# Patient Record
Sex: MALE | Race: WHITE | Employment: FULL TIME | URBAN - NONMETROPOLITAN AREA
[De-identification: names, ages, dates, MRNs, and addresses within clinical notes are randomized per-mention and may not be internally consistent; named-entity substitution may affect disease eponyms.]

---

## 2022-11-18 ENCOUNTER — HOSPITAL ENCOUNTER (EMERGENCY)
Age: 42
Discharge: HOME | End: 2022-11-18
Attending: FAMILY MEDICINE
Payer: COMMERCIAL

## 2022-11-18 VITALS
HEIGHT: 67 IN | BODY MASS INDEX: 30.34 KG/M2 | TEMPERATURE: 98.8 F | RESPIRATION RATE: 18 BRPM | SYSTOLIC BLOOD PRESSURE: 139 MMHG | WEIGHT: 193.3 LBS | HEART RATE: 64 BPM | OXYGEN SATURATION: 98 % | DIASTOLIC BLOOD PRESSURE: 73 MMHG

## 2022-11-18 DIAGNOSIS — M54.31 SCIATICA OF RIGHT SIDE: Primary | ICD-10-CM

## 2022-11-18 PROCEDURE — 99283 EMERGENCY DEPT VISIT LOW MDM: CPT

## 2022-11-18 RX ORDER — IBUPROFEN 600 MG/1
600 TABLET ORAL EVERY 6 HOURS PRN
Qty: 30 TABLET | Refills: 0 | Status: SHIPPED | OUTPATIENT
Start: 2022-11-18

## 2022-11-18 RX ORDER — PREDNISONE 20 MG/1
60 TABLET ORAL DAILY
Qty: 15 TABLET | Refills: 0 | Status: SHIPPED | OUTPATIENT
Start: 2022-11-18 | End: 2022-11-23

## 2022-11-18 ASSESSMENT — PAIN DESCRIPTION - DESCRIPTORS: DESCRIPTORS: ACHING

## 2022-11-18 ASSESSMENT — PAIN DESCRIPTION - LOCATION: LOCATION: BACK

## 2022-11-18 ASSESSMENT — PAIN DESCRIPTION - ORIENTATION: ORIENTATION: RIGHT

## 2022-11-18 ASSESSMENT — PAIN - FUNCTIONAL ASSESSMENT: PAIN_FUNCTIONAL_ASSESSMENT: 0-10

## 2022-11-18 ASSESSMENT — PAIN DESCRIPTION - PAIN TYPE: TYPE: ACUTE PAIN

## 2022-11-18 NOTE — LETTER
[facility] ED  [address] [facility] [address]  Phone: [phone]               November 18, 2022    Patient: Frankey Gutta   YOB: 1980   Date of Visit: 11/18/2022       To Whom It May Concern:    Frankey Gutta was seen and treated in our emergency department on 11/18/2022. He may return to work on 11/21/2022.       Sincerely,       Nicanor Bennett RN         Signature:__________________________________

## 2022-11-19 NOTE — ED PROVIDER NOTES
[address] [facility]  eMERGENCY dEPARTMENT eNCOUnter          CHIEF COMPLAINT       Chief Complaint   Patient presents with    Back Pain     Pt has sudden back pain to right side of back while at work and fell backwards. [facility] called out for seizures. Nurses Notes reviewed and I agree except as noted in the HPI. HISTORY OF PRESENT ILLNESS    Iliana Ruiz is a 36 y.o. male who presents to the emergency room via private vehicle, patient had sudden onset back pain, EMS had initially been called for possible seizures, patient states he has a history of seizures but not have been a 19 years, they when he fell backwards and was stiff and some he probably thought he was having 1, patient declined EMS transport. Patient states he was standing at work leaning forward, when a sudden onset of back pain aching in the right lower back, and fell backwards at the time, states 2 coworkers helped him back, and knees was very stiff, though was not having any seizure event, remembers everything that happened. Patient continues to have pain in his right lower back, describes the pain more as aching at this time, does not quantify the pain. Denies radiation of the pain down the leg. Denies loss of bowel or bladder continence denies urinary retention denies loss of sensation between legs. Denies any prior history of similar. REVIEW OF SYSTEMS     Review of Systems   All other systems reviewed and are negative. PAST MEDICAL HISTORY    has a past medical history of Seizures ([facility] [address] SURGICAL HISTORY      has no past surgical history on file. CURRENT MEDICATIONS       Discharge Medication List as of 11/18/2022  5:49 PM          ALLERGIES     is allergic to benadryl [diphenhydramine] and topamax [topiramate]. FAMILY HISTORY     has no family status information on file. family history is not on file.     SOCIAL HISTORY          PHYSICAL EXAM     INITIAL VITALS:  height is 5' 6.5\" (1.689 m) and weight is 193 lb 4.8 oz (87.7 kg). His oral temperature is 98.8 °F (37.1 °C). His blood pressure is 139/73 and his pulse is 64. His respiration is 18 and oxygen saturation is 98%. Physical Exam   Constitutional: Patient is oriented to person, place, and time. Patient appears well-developed and well-nourished. Patient is active and cooperative. HENT:   Head: Normocephalic and atraumatic. Head is without contusion. Right Ear: Hearing and external ear normal. No drainage. Left Ear: Hearing and external ear normal. No drainage. Nose: Nose normal. No nasal deformity. No epistaxis. Mouth/Throat: Mucous membranes are not dry. Eyes: EOMI. Conjunctivae, sclera, and lids are normal. Right eye exhibits no discharge. Left eye exhibits no discharge. Neck: Full passive range of motion without pain and phonation normal.   Cardiovascular:  Normal rate, regular rhythm and intact distal pulses. No edema. Negative Homans' sign  Pulses: Right radial pulse  2+   Pulmonary/Chest: Effort normal. No tachypnea and no bradypnea. No wheezes, rhonchi, or rales. Abdominal: Soft. Patient without distension or tenderness  Musculoskeletal:   Focused examination of patient's lower back shows no vertebral point step-off or point tenderness, there are some right paraspinal muscle spasm, as well as some tightness in tenderness in the midline superior buttocks area, no overlying integument aberration. Patient is able to flex at the hip and the knee as well as rotate within the hip joint without difficulty, although straight leg test was weakly positive on the right. Distal CSM intact. Except as otherwise noted, negative acute trauma or deformity,  apparent full range of motion and normal strength all extremities appropriate to age. Neurological: Patient is alert and oriented to person, place, and time. patient displays no tremor. Patient displays no seizure activity.  .  Straight leg test negative on the left, weakly positive on the right. Patellar reflexes absent bilaterally, Achilles reflexes 1+ bilaterally. Lymphatic:    Skin: Skin is warm and dry. Patient is not diaphoretic. Psychiatric: Patient has a normal mood and affect. Patient speech is normal and behavior is normal. Cognition and memory are normal.     DIFFERENTIAL DIAGNOSIS:   MSK NOS, sciatica    DIAGNOSTIC RESULTS           RADIOLOGY: non-plain film images(s) such as CT, Ultrasound and MRI are read by the radiologist.  No orders to display       LABS:   Labs Reviewed - No data to display    EMERGENCY DEPARTMENT COURSE:   Vitals:    Vitals:    11/18/22 1716   BP: 139/73   Pulse: 64   Resp: 18   Temp: 98.8 °F (37.1 °C)   TempSrc: Oral   SpO2: 98%   Weight: 193 lb 4.8 oz (87.7 kg)   Height: 5' 6.5\" (1.689 m)     Patient history and physical exam taken at bedside, discussed patient symptoms and exam findings, discussed clinical diagnosis likely sciatica on the right side, discussed with patient the meaning of this term, discussed stretching exercises, Motrin/Tylenol for baseline pain control, place patient on steroid burst, outpatient follow-up, return if symptoms change worse other concerns, acknowledged    FINAL IMPRESSION      1.  Sciatica of right side          DISPOSITION/PLAN   D/c    PATIENT REFERRED TO:  Nanci Sutton  [address] [place] [address]  [phone]    Call         DISCHARGE MEDICATIONS:  Discharge Medication List as of 11/18/2022  5:49 PM        START taking these medications    Details   predniSONE (DELTASONE) 20 MG tablet Take 3 tablets by mouth daily for 5 days, Disp-15 tablet, R-0Normal      ibuprofen (IBU) 600 MG tablet Take 1 tablet by mouth every 6 hours as needed for Pain, Disp-30 tablet, R-0Normal                 Summation      Patient Course:  d/c    ED Medications administered this visit:  Medications - No data to display    New Prescriptions from this visit:    Discharge Medication List as of 11/18/2022  5:49 PM

## 2023-09-23 ENCOUNTER — HOSPITAL ENCOUNTER (EMERGENCY)
Age: 43
Discharge: HOME | End: 2023-09-23
Payer: MEDICARE

## 2023-09-23 VITALS
SYSTOLIC BLOOD PRESSURE: 135 MMHG | HEART RATE: 55 BPM | DIASTOLIC BLOOD PRESSURE: 61 MMHG | RESPIRATION RATE: 15 BRPM | OXYGEN SATURATION: 98 %

## 2023-09-23 VITALS — OXYGEN SATURATION: 99 % | HEART RATE: 62 BPM | RESPIRATION RATE: 15 BRPM

## 2023-09-23 VITALS
HEART RATE: 57 BPM | OXYGEN SATURATION: 98 % | DIASTOLIC BLOOD PRESSURE: 60 MMHG | SYSTOLIC BLOOD PRESSURE: 135 MMHG | RESPIRATION RATE: 10 BRPM

## 2023-09-23 VITALS — RESPIRATION RATE: 12 BRPM | HEART RATE: 61 BPM | OXYGEN SATURATION: 97 %

## 2023-09-23 VITALS — OXYGEN SATURATION: 97 % | HEART RATE: 53 BPM | RESPIRATION RATE: 15 BRPM

## 2023-09-23 VITALS — OXYGEN SATURATION: 99 %

## 2023-09-23 VITALS — BODY MASS INDEX: 33.6 KG/M2

## 2023-09-23 VITALS — HEART RATE: 55 BPM | RESPIRATION RATE: 10 BRPM | OXYGEN SATURATION: 100 %

## 2023-09-23 VITALS — RESPIRATION RATE: 14 BRPM | OXYGEN SATURATION: 99 % | HEART RATE: 63 BPM

## 2023-09-23 VITALS — OXYGEN SATURATION: 99 % | RESPIRATION RATE: 17 BRPM | HEART RATE: 69 BPM

## 2023-09-23 VITALS
DIASTOLIC BLOOD PRESSURE: 80 MMHG | OXYGEN SATURATION: 99 % | TEMPERATURE: 98.3 F | HEART RATE: 69 BPM | RESPIRATION RATE: 18 BRPM | SYSTOLIC BLOOD PRESSURE: 138 MMHG

## 2023-09-23 VITALS — HEART RATE: 53 BPM | RESPIRATION RATE: 6 BRPM | OXYGEN SATURATION: 95 %

## 2023-09-23 VITALS — OXYGEN SATURATION: 99 % | RESPIRATION RATE: 15 BRPM | HEART RATE: 56 BPM

## 2023-09-23 VITALS
OXYGEN SATURATION: 99 % | SYSTOLIC BLOOD PRESSURE: 123 MMHG | DIASTOLIC BLOOD PRESSURE: 63 MMHG | RESPIRATION RATE: 12 BRPM | HEART RATE: 57 BPM

## 2023-09-23 VITALS — OXYGEN SATURATION: 98 %

## 2023-09-23 VITALS
DIASTOLIC BLOOD PRESSURE: 67 MMHG | OXYGEN SATURATION: 99 % | SYSTOLIC BLOOD PRESSURE: 127 MMHG | RESPIRATION RATE: 13 BRPM | HEART RATE: 65 BPM

## 2023-09-23 VITALS
SYSTOLIC BLOOD PRESSURE: 120 MMHG | OXYGEN SATURATION: 100 % | RESPIRATION RATE: 16 BRPM | DIASTOLIC BLOOD PRESSURE: 89 MMHG | HEART RATE: 64 BPM

## 2023-09-23 VITALS — HEART RATE: 60 BPM | OXYGEN SATURATION: 98 % | RESPIRATION RATE: 14 BRPM

## 2023-09-23 VITALS — RESPIRATION RATE: 12 BRPM | HEART RATE: 64 BPM | OXYGEN SATURATION: 98 %

## 2023-09-23 VITALS — RESPIRATION RATE: 13 BRPM | HEART RATE: 65 BPM | OXYGEN SATURATION: 99 %

## 2023-09-23 DIAGNOSIS — Z79.82: ICD-10-CM

## 2023-09-23 DIAGNOSIS — F41.0: Primary | ICD-10-CM

## 2023-09-23 DIAGNOSIS — F17.210: ICD-10-CM

## 2023-09-23 DIAGNOSIS — Z79.899: ICD-10-CM

## 2023-09-23 LAB
ADD MANUAL DIFF: NO
ANION GAP: 15.7
BLOOD UREA NITROGEN: 9 MG/DL (ref 7–18)
CALCIUM: 8.6 MG/DL (ref 8.5–10.1)
CARBON DIOXIDE: 25.3 MMOL/L (ref 21–32)
CHLORIDE: 103 MMOL/L (ref 98–107)
CO2 BLD-SCNC: 25.3 MMOL/L (ref 21–32)
ESTIMATED GFR (AFRICAN AMERICA: >60 (ref 60–?)
ESTIMATED GFR (NON-AFRICAN AME: >60 (ref 60–?)
GLUCOSE BLD-MCNC: 103 MG/DL (ref 74–106)
HCT VFR BLD CALC: 45.9 % (ref 42–54)
HEMATOCRIT: 45.9 % (ref 42–54)
HEMOGLOBIN: 15.8 G/DL (ref 14–18)
IMMATURE GRANULOCYTES ABS AUTO: 0.04 10^3/UL (ref 0–0.03)
IMMATURE GRANULOCYTES PCT AUTO: 0.5 % (ref 0–0.5)
LYMPHOCYTES  ABSOLUTE AUTO: 3.2 10^3/UL (ref 1.2–3.8)
MCV RBC: 90 FL (ref 80–94)
MEAN CORPUSCULAR HEMOGLOBIN: 31 PG (ref 25.9–34)
MEAN CORPUSCULAR HGB CONC: 34.4 G/DL (ref 29.9–35.2)
MEAN CORPUSCULAR VOLUME: 90 FL (ref 80–94)
PLATELET # BLD: 276 10^3/UL (ref 150–450)
PLATELET COUNT: 276 10^3/UL (ref 150–450)
POTASSIUM SERPLBLD-SCNC: 4 MMOL/L (ref 3.5–5.1)
POTASSIUM: 4 MMOL/L (ref 3.5–5.1)
RED BLOOD COUNT: 5.1 10^6/UL (ref 4.7–6.1)
SODIUM BLD-SCNC: 140 MMOL/L (ref 136–145)
SODIUM: 140 MMOL/L (ref 136–145)
WBC # BLD: 8.1 10^3/UL (ref 4–11)
WHITE BLOOD COUNT: 8.1 10^3/UL (ref 4–11)

## 2023-09-23 PROCEDURE — 85025 COMPLETE CBC W/AUTO DIFF WBC: CPT

## 2023-09-23 PROCEDURE — 36415 COLL VENOUS BLD VENIPUNCTURE: CPT

## 2023-09-23 PROCEDURE — 80048 BASIC METABOLIC PNL TOTAL CA: CPT

## 2023-09-23 PROCEDURE — 99285 EMERGENCY DEPT VISIT HI MDM: CPT

## 2023-09-23 PROCEDURE — 93005 ELECTROCARDIOGRAM TRACING: CPT

## 2023-09-23 PROCEDURE — 71045 X-RAY EXAM CHEST 1 VIEW: CPT

## 2023-09-23 NOTE — ECG_ITS
[facility] 
                                        
                                       Test Date:    2023 
Pat Name:     CANDE GANDHI         Department:    
Patient ID:   [id number]               Room:         - 
Gender:       Male                     Technician:    
:          1980               Requested By:  
Order Number: [id number]              Reading MD:   SILVINO HILL 
                                 Measurements 
Intervals                              Axis           
Rate:         70                       P:            71 
AL:           118                      QRS:          -9 
QRSD:         98                       T:            19 
QT:           418                                     
QTc:          439                                     
                           Interpretive Statements 
1100 Sinus rhythm 
2210 Short AL interval 
9150 **  abnormal ECG  ** 
No previous ECG available for comparison 
Electronically Signed On 2023 18:54:43 EDT by SILVINO HILL

## 2023-09-23 NOTE — ED.GENADUL1
HPI - General Adult
General
Chief complaint: Neuro Symptoms/Deficit
Stated complaint: SOB/FEET AND HAND TINGLING/NUMBNESS
Time Seen by Provider: 09/23/23 09:28
Source: patient
Mode of arrival: walk-in
Limitations: no limitations
History of Present Illness
HPI narrative: 
43-year-old male presents to the emergency department for tingling and shortness of breath. This started as he was on his way to work it's before coming into the emergency department and he was lighting a cigarette. He feels better now. He thinks it 
might be a panic attack which she's had in the past. No fever cough or vomiting. No injury.
Related Data
Home Medications

 Medication  Instructions  Recorded  Confirmed
aspirin 81 mg capsule 81 mg PO DAILY 09/23/23 09/23/23
levetiracetam 100 mg/mL oral 750 mg PO BID 09/23/23 09/23/23
solution   


Allergies

Allergy/AdvReac Type Severity Reaction Status Date / Time
topiramate [From Topamax] AdvReac Severe suicidal Verified 09/23/23 09:34
diphenhydramine AdvReac Intermediate Hypertensio Verified 09/23/23 09:34
[From Benadryl]   n  



Review of Systems
ROS  
 Narrative A ten point review of systems is negative except as noted above.   

PFSH
PFSH
Social History
Smoking status:  Current every day smoker 



Exam
Narrative
Exam Narrative: 
Nurses note and vital signs reviewed and patient is not hypoxic.

General:  The patient appears well and in no apparent distress.  Patient is resting comfortably on cart.
Skin:  Warm, dry, no pallor noted.  There is no rash noted.
Head:  Normocephalic, atraumatic
Eye: Normal conjunctiva, no drainage
Ears, Nose, Mouth, and Throat: oral mucosa is moist. Nares patent. 
Cardiovascular:  Regular Rate and Rhythm
Respiratory:  Patient is in no distress, no accessory muscle use, lungs are clear to auscultation, no wheezing, rales or rhonchi
Back:  non-tender
GI:  no tenderness to palpation, no masses appreciated.  No rebound, guarding, or rigidity noted.
Musculoskeletal: The patient has no evidence of calf tenderness, no pitting edema, symmetrical pulses noted bilaterally
Neurological:  A&O x4
Psychiatric:  Cooperative
Constitutional
Vital Signs, click to edit/add: 

Last Vital Signs

Temp  98.3 F   09/23/23 09:29
Pulse  55 L  09/23/23 11:20
Resp  10 L  09/23/23 11:20
BP  120/89   09/23/23 11:01
Pulse Ox  100   09/23/23 11:20
O2 Del Method  Room Air  09/23/23 09:36




Course
Vital Signs
Vital signs: 

Vital Signs

Temperature  98.3 F   09/23/23 09:29
Pulse Rate  69   09/23/23 09:29
Respiratory Rate  18   09/23/23 09:29
Blood Pressure  138/80   09/23/23 09:29
Pulse Oximetry  99   09/23/23 09:29
Oxygen Delivery Method  Room Air  09/23/23 09:29



Temperature  98.3 F   09/23/23 09:29
Pulse Rate  55 L  09/23/23 11:20
Respiratory Rate  10 L  09/23/23 11:20
Blood Pressure  120/89   09/23/23 11:01
Pulse Oximetry  100   09/23/23 11:20
Oxygen Delivery Method  Room Air  09/23/23 09:36




Medical Decision Making
MDM Narrative
Medical decision making narrative: 
his workup is negative and he feels much better now. My clinical impression is that his symptoms were due to a panic attack. He's discharged home and was given a work note. Treatment diagnosis and follow-up were discussed with the patient.
Differential Diagnosis
Differential Diagnosis: panic attack, anxiety, cardiac dysrhythmia, electrolyte imbalance
Lab Data
Lab results reviewed: Yes I reviewed the patient's lab results
Labs: 

Lab Results

  09/23/23 Range/Units
  09:45 
WBC  8.1  (4.0-11.0)  10^3/uL
RBC  5.10  (4.70-6.10)  10^6/uL
Hgb  15.8  (14.0-18.0)  g/dL
Hct  45.9  (42.0-54.0)  %
MCV  90.0  (80.0-94.0)  fL
MCH  31.0  (25.9-34.0)  pg
MCHC  34.4  (29.9-35.2)  g/dL
RDW  13.2  (11.0-15.0)  %
Plt Count  276  (150-450)  10^3/uL
MPV  8.7 L  (9.5-13.5)  fL
Neut % (Auto)  48.1  (43.0-75.0)  %
Lymph % (Auto)  39.1  (20.5-60.0)  %
Mono % (Auto)  8.4  (1.7-12.0)  %
Eos % (Auto)  3.0  (0.9-7.0)  %
Baso % (Auto)  0.9  (0.2-2.0)  %
Neut # (Auto)  3.9  (1.4-6.5)  10^3/uL
Lymph # (Auto)  3.2  (1.2-3.8)  10^3/uL
Mono # (Auto)  0.7  (0.3-0.8)  10^3/uL
Eos # (Auto)  0.2  (0.0-0.7)  10^3/uL
Baso # (Auto)  0.1  (0.0-0.1)  10^3/uL
Abs Immat Gran (auto)  0.04 H  (0.00-0.03)  10^3/uL
Imm/Tot Granulo (auto)  0.5  (0.0-0.5)  %
Sodium  140  (136-145)  mmol/L
Potassium  4.0  (3.5-5.1)  mmol/L
Chloride  103  ()  mmol/L
Carbon Dioxide  25.3  (21.0-32.0)  mmol/L
Anion Gap  15.7  
BUN  9.0  (7.0-18.0)  mg/dL
Creatinine  0.85  (0.70-1.30)  mg/dL
Est GFR ( Amer)  >60  (>=60)  
Est GFR (Non-Af Amer)  >60  (>=60)  
BUN/Creatinine Ratio  10.6  
Glucose  103  ()  mg/dL
Calcium  8.6  (8.5-10.1)  mg/dL



Imaging Data
Chest x-ray: 
      Radiologist's impression: 
no acute findings
ECG Data
Attestation: I personally reviewed and interpreted this ECG as follows: (EKG on my interpretation shows normal sinus rhythm and a rate of 70.)

Discharge Plan
Discharge
Chief Complaint: Neuro Symptoms/Deficit

Clinical Impression:
 Panic attack


Patient Disposition: Home, Self-Care

Time of Disposition Decision: 11:34

Condition: Good

Mode of Transportation: Private Vehicle

Prescriptions / Home Meds:
No Action
  levetiracetam 100 mg/mL solution 
   750 mg PO BID 
  aspirin 81 mg capsule 
   81 mg PO DAILY 

Instructions:  Panic Attack (ED)

Stand Alone Forms:  Portal Instructions

Referrals:
BREONNA MCMULLEN [Primary Care Provider] - 1 week

## 2023-09-23 NOTE — XR_ITS
The [address] [address] 
     [phone] 
  
  
Patient Name: 
CANDE GANDHI 
  
MRN: [id number]    YOB: 1980    Sex: M 
Assigned Patient Location: ER 
Current Patient Location: ER 
Accession/Order Number: [id number] 
Exam Date: 9/23/2023  09:58    Report Date: 9/23/2023  16:06 
  
At the request of: 
AMINA GONZALEZ   
  
Procedure:  XR chest 1V 
  
EXAM: XR chest 1V  
  
HISTORY:  
Shortness of breath.  
  
COMPARISON:  
None.  
  
FINDINGS:  
The heart appears within normal limits in size. No focal consolidation,  
pleural  
effusion, pneumothorax or evidence of congestive heart failure is seen.  
  
ORDER #: [id number] XR/XR chest 1V  
IMPRESSION:   
   
No radiographic evidence of active cardiopulmonary disease is seen.  
   
   
Electronically authenticated by: INEZ DIAZ   Date: 9/23/2023  16:06

## 2023-10-29 ENCOUNTER — HOSPITAL ENCOUNTER (EMERGENCY)
Age: 43
Discharge: HOME | End: 2023-10-29
Payer: MEDICARE

## 2023-10-29 VITALS
HEART RATE: 61 BPM | OXYGEN SATURATION: 97 % | TEMPERATURE: 97.7 F | SYSTOLIC BLOOD PRESSURE: 139 MMHG | RESPIRATION RATE: 18 BRPM | DIASTOLIC BLOOD PRESSURE: 73 MMHG

## 2023-10-29 VITALS — BODY MASS INDEX: 32.3 KG/M2

## 2023-10-29 DIAGNOSIS — F17.210: ICD-10-CM

## 2023-10-29 DIAGNOSIS — Z79.899: ICD-10-CM

## 2023-10-29 DIAGNOSIS — L25.9: Primary | ICD-10-CM

## 2023-10-29 DIAGNOSIS — Z79.82: ICD-10-CM

## 2023-10-29 PROCEDURE — 99285 EMERGENCY DEPT VISIT HI MDM: CPT

## 2023-10-29 NOTE — PC.NURSE
Complains of itching to bilateral hands, skin slightly reddened, no rash noted. Denies any new soaps or cleaning solutions.

## 2023-10-29 NOTE — ED_ITS
HPI - Allergic Reaction    
General    
Chief complaint: Allergic Reaction    
Stated complaint: ALLERGIC REACTION ON HANDS    
Time Seen by Provider: 10/29/23 12:38    
Source: patient    
Mode of arrival: walk-in    
Limitations: no limitations    
History of Present Illness    
HPI narrative:     
43-year-old male presents for itching and rash on the palms of his hands. It   
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   
ALLERGIES. Cart breathing or swallowing or tongue swelling. The rash is limited   
to the palms of his hands. He's had it for a few days and it's continuous.    
Related Data    
                                Home Medications    
    
    
    
 Medication  Instructions  Recorded  Confirmed    
     
aspirin 81 mg capsule 81 mg PO DAILY 09/23/23 09/23/23    
     
levetiracetam 100 mg/mL oral 750 mg PO BID 09/23/23 09/23/23    
    
solution       
    
    
                                  Previous Rx's    
    
    
    
 Medication  Instructions  Recorded    
     
prednisone 10 mg tablet See Rx Instructions .Route 10/29/23    
    
 .COMPLEX #30 tabs     
    
    
    
                                    Allergies    
    
    
    
Allergy/AdvReac Type Severity Reaction Status Date / Time    
     
topiramate [From Topamax] AdvReac Severe suicidal Verified 10/29/23 12:40    
     
diphenhydramine AdvReac Intermediate Hypertensio Verified 10/29/23 12:40    
    
[From Benadryl]   n      
    
    
    
    
Review of Systems    
    
    
ROS      
    
 Narrative A ten point review of systems is negative except as noted above.       
    
    
PFSH    
PFSH    
Social History    
Smoking status:  Current every day smoker     
    
    
    
Exam    
Narrative    
Exam Narrative:     
Nurses note and vital signs reviewed and patient is not hypoxic.    
    
General:  The patient appears well and in no apparent distress.  Patient is   
resting comfortably on cart.    
Skin:  Warm, dry, no pallor noted.  There is erythema noted to the palms of each  
hand.    
Head:  Normocephalic, atraumatic    
Eye: Normal conjunctiva, no drainage    
Ears, Nose, Mouth, and Throat: oral mucosa is moist. Nares patent.     
Cardiovascular:  Regular Rate and Rhythm    
Respiratory:  Patient is in no distress, no accessory muscle use, lungs are   
clear to auscultation, no wheezing, rales or rhonchi    
Back:  non-tender    
GI:  often nontender    
Musculoskeletal: The patient has no evidence of calf tenderness, no pitting   
edema, symmetrical pulses noted bilaterally    
Neurological:  A&O    
Psychiatric:  Cooperative    
Constitutional    
Vital Signs, click to edit/add:     
    
                                Last Vital Signs    
    
    
    
Temp  97.7 F   10/29/23 12:36    
     
Pulse  61   10/29/23 12:36    
     
Resp  18   10/29/23 12:36    
     
BP  139/73   10/29/23 12:36    
     
Pulse Ox  97   10/29/23 12:36    
     
O2 Del Method  Room Air  10/29/23 12:36    
    
    
    
    
    
Course    
Vital Signs    
Vital signs:     
    
                                   Vital Signs    
    
    
    
Temperature  97.7 F   10/29/23 12:36    
     
Pulse Rate  61   10/29/23 12:36    
     
Respiratory Rate  18   10/29/23 12:36    
     
Blood Pressure  139/73   10/29/23 12:36    
     
Pulse Oximetry  97   10/29/23 12:36    
     
Oxygen Delivery Method  Room Air  10/29/23 12:36    
    
    
                                            
    
    
    
Temperature  97.7 F   10/29/23 12:36    
     
Pulse Rate  61   10/29/23 12:36    
     
Respiratory Rate  18   10/29/23 12:36    
     
Blood Pressure  139/73   10/29/23 12:36    
     
Pulse Oximetry  97   10/29/23 12:36    
     
Oxygen Delivery Method  Room Air  10/29/23 12:36    
    
    
    
    
    
MDM - Allergic Reaction    
MDM Narrative    
Medical decision making narrative:     
my clinical impressions that he has a contact dermatitis. He'll be prescribed   
prednisone. Treatment diagnosis and follow-up were discussed with the patient.    
Differential Diagnosis    
Differential diagnosis: Likely allergic reaction, contact dermatitis, viral   
enanthem and urticaria    
    
Discharge Plan    
Discharge    
Chief Complaint: Allergic Reaction    
    
Clinical Impression:    
 Contact dermatitis    
    
    
Patient Disposition: Home, Self-Care    
    
Time of Disposition Decision: 12:51    
    
Condition: Good    
    
Mode of Transportation: Private Vehicle    
    
Prescriptions / Home Meds:    
New    
  prednisone 10 mg tablet     
   See Rx Instructions  .ROUTE .COMPLEX Qty: 30 0RF    
   Rx Instructions:    
   4 by mouth daily for three days then 3 by mouth daily for three days then 2   
by mouth daily for three days then 1 by mouth daily for three days    
    
No Action    
  levetiracetam 100 mg/mL solution     
   750 mg PO BID     
  aspirin 81 mg capsule     
   81 mg PO DAILY     
    
Instructions:  Contact Dermatitis (ED)    
    
Stand Alone Forms:  Portal Instructions    
    
Referrals:    
BREONNA MCMULLEN [Primary Care Provider] - 1 week

## 2024-01-16 ENCOUNTER — HOSPITAL ENCOUNTER (EMERGENCY)
Age: 44
Discharge: HOME | End: 2024-01-16
Payer: MEDICARE

## 2024-01-16 VITALS — OXYGEN SATURATION: 98 %

## 2024-01-16 VITALS
TEMPERATURE: 99.32 F | SYSTOLIC BLOOD PRESSURE: 135 MMHG | HEART RATE: 63 BPM | DIASTOLIC BLOOD PRESSURE: 95 MMHG | RESPIRATION RATE: 18 BRPM | OXYGEN SATURATION: 97 %

## 2024-01-16 VITALS — BODY MASS INDEX: 32.4 KG/M2

## 2024-01-16 DIAGNOSIS — J06.9: ICD-10-CM

## 2024-01-16 DIAGNOSIS — Z79.82: ICD-10-CM

## 2024-01-16 DIAGNOSIS — M54.2: Primary | ICD-10-CM

## 2024-01-16 DIAGNOSIS — Z79.899: ICD-10-CM

## 2024-01-16 DIAGNOSIS — G40.909: ICD-10-CM

## 2024-01-16 DIAGNOSIS — F17.210: ICD-10-CM

## 2024-01-16 PROCEDURE — 72040 X-RAY EXAM NECK SPINE 2-3 VW: CPT

## 2024-01-16 PROCEDURE — 99284 EMERGENCY DEPT VISIT MOD MDM: CPT

## 2024-01-16 PROCEDURE — 71046 X-RAY EXAM CHEST 2 VIEWS: CPT

## 2024-01-16 NOTE — XR_ITS
The [address] [address] 
     [phone] 
  
  
Patient Name: 
CANDE GANDHI 
  
MRN: [id number]    YOB: 1980    Sex: M 
Assigned Patient Location: ER 
Current Patient Location: ER 
Accession/Order Number: [id number] 
Exam Date: 1/16/2024  16:10    Report Date: 1/16/2024  16:26 
  
At the request of: 
SELIN ROMANO   
  
Procedure:  XR cervical spine 2-3V 
  
EXAM: XR cervical spine 2-3V  
  
HISTORY: Neck pain 
  
COMPARISON: None.  
  
TECHNIQUE: AP, lateral, open-mouth, radiographs of the cervical spine 
  
FINDINGS:  
The teeth are absent. No cervical spine fracture. Disc height loss and  
osteophytes at C5-C6 and C6-C7. Normal mineralization. Uncovertebral  
degenerative changes of C6 on the right. Prevertebral soft tissues are normal. 
  
ORDER #: [id number] XR/XR cervical spine 2-3V  
IMPRESSION:   
   
Degenerative changes of the cervical spine at C5-C6 and C6-C7.  
   
   
Electronically authenticated by: CHANDLER XIONG   Date: 1/16/2024  16:26

## 2024-01-16 NOTE — ED.NECK1
Documented by User:  PONCE Ariza  01/16/24 16:39

HPI - Neck Pain/Injury
General
Chief Complaint: Neck Pain/Injury
Stated Complaint: Neck Pain
Time Seen by Provider: 01/16/24 15:46
Source: patient
Mode of arrival: walk-in
History of Present Illness
HPI Narrative: 
Patient is a 43-year-old male who presents to the emergency department for the evaluation of multiple complaints.  He states his primary reason for coming to the emergency department was because he was at work when he felt a sharp pain at the base 
of the skull at the top of the left side of the cervical spine.  He denies any mechanism of injury or trauma.  He states that sharp pain occurred 2 more times but has completely resolved at this time and he is no longer having any discomfort.  He 
denies visual changes, nausea, vomiting.  He has no pain radiation into the extremities, numbness, tingling.  He further complains of upper respiratory symptoms of cough and congestion for the last week.  He states he stopped taking his allergy 
medication and has noticed the symptoms.  He states he is intermittently short of breath when he is coughing vigorously.  He is resting comfortably and states that this time he has no chest pain, shortness of breath, he has not had any fevers.
Related Data
Home Medications

 Medication  Instructions  Recorded  Confirmed
aspirin 81 mg capsule 81 mg PO DAILY 09/23/23 01/16/24
levetiracetam 100 mg/mL oral 750 mg PO BID 09/23/23 01/16/24
solution   
simvastatin 20 mg tablet 20 mg PO DAILY 01/16/24 01/16/24

Previous Rx's

 Medication  Instructions  Recorded
brompheniramine-pseudoephedrine-DM 10 ml PO Q6H PRN cold symptoms 01/16/24
2 mg-30 mg-10 mg/5 mL oral syrup #200 mL 
(Bromfed DM)  
methocarbamol 750 mg tablet 750 mg PO TID PRN pain #20 tabs 01/16/24
methylprednisolone 4 mg tablets in See Rx Instructions .Route 01/16/24
a dose pack (Medrol (Caden)) .COMPLEX #21 ea 


Allergies

Allergy/AdvReac Type Severity Reaction Status Date / Time
topiramate [From Topamax] AdvReac Severe suicidal Verified 10/29/23 12:40
diphenhydramine AdvReac Intermediate Hypertensio Verified 10/29/23 12:40
[From Benadryl]   n  



Review of Systems
ROS  
 Constitutional Denies: fever or chills   
 Ears, nose, mouth, and throat Reports: ear pain and nasal congestion; Denies: throat pain   
 Cardiovascular Denies: chest pain   
 Respiratory Reports: shortness of breath and cough; Denies: wheezing   
 Gastrointestinal Denies: nausea, vomiting or diarrhea   
 Genitourinary Denies: painful urination   
 Musculoskeletal Denies: back pain or extremity swelling   
 Integumentary/Breast Denies: rash   
 Neurological Denies: headache   

[facility]
Medical History (Updated 01/16/24 @ 16:36 by PONCE Ariza)

Epilepsy
 ?G40.909 - Epilepsy, unspecified, not intractable, without status epilepticus (ICD-10)


Social History (Reviewed 01/16/24 @ 16:05 by PONCE Ariza)
Smoking status:  Current every day smoker 



Exam
Narrative
Exam Narrative: 
Gen.: Awake, alert, in no distress
Head: Normocephalic, atraumatic
ENT: Moist mucous membranes, Bilateral TMs obscured by dark wax, no pharyngeal erythema.  Clear speech. No bony point tenderness of the cervical spine.  No tenderness of the paraspinal muscles of the cervical spine.  Normal range of motion, no 
nuchal rigidity
Respiratory: No respiratory distress, lungs clear bilaterally, No wheezing or rhonchi
Cardio: Regular rate and rhythm
Extremities: Moves extremities equally, no injuries noted
Psych: Normal mood and affect
Neuro: No focal neuro deficit
Skin: Warm, dry, intact

Constitutional
Vital Signs, click to edit/add: 

Last Vital Signs

Temp  99.3 F   01/16/24 15:48
Pulse  63   01/16/24 15:48
Resp  18   01/16/24 15:48
BP  135/95 H  01/16/24 15:48
Pulse Ox  98   01/16/24 15:57
O2 Del Method  Room Air  01/16/24 15:57




Course
Vital Signs
Vital signs: 

Vital Signs

Temperature  99.3 F   01/16/24 15:48
Pulse Rate  63   01/16/24 15:48
Respiratory Rate  18   01/16/24 15:48
Blood Pressure  135/95 H  01/16/24 15:48
Pulse Oximetry  97   01/16/24 15:48
Oxygen Delivery Method  Room Air  01/16/24 15:48



Temperature  99.3 F   01/16/24 15:48
Pulse Rate  63   01/16/24 15:48
Respiratory Rate  18   01/16/24 15:48
Blood Pressure  135/95 H  01/16/24 15:48
Pulse Oximetry  98   01/16/24 15:57
Oxygen Delivery Method  Room Air  01/16/24 15:57




MDM - Neck Pain/Injury
MDM Narrative
Medical decision making narrative: 
I discussed with the patient there is no indication for COVID or influenza testing at this time as he has stable vital signs and has had mild upper respiratory symptoms for the past week, he is out of the window for quarantine for COVID or flu.  He 
has clear lung sounds with no wheezing or rhonchi.  He is sent for x-rays of the cervical spine and chest to rule out acute bony abnormality or pneumonia.  He is resting comfortably and maintained stable vital signs in the ER. He has no complaints 
of pain in the ER.
Medical Records
Attestation: I reviewed the patient's medical records.
Imaging Data
Chest x-ray: 
      Attestation: I have reviewed the pertinent imaging results.
      Radiologist's impression: 

ITS Impressions

Cervical Spine X-Ray  01/16/24 16:03
IMPRESSION: 
 
Degenerative changes of the cervical spine at C5-C6 and C6-C7.
 
 
Electronically authenticated by: CHANDLER XIONG   Date: 1/16/2024  16:26


Chest X-Ray  01/16/24 16:03
IMPRESSION: 
 
No acute abnormality identified.
 
 
 
 
Electronically authenticated by: DOC DESAI   Date: 1/16/2024  16:29





Discharge Plan
Discharge
Chief Complaint: Neck Pain/Injury

Clinical Impression:
 Acute neck pain, Upper respiratory infection


Patient Disposition: Home, Self-Care

Time of Disposition Decision: 16:35

Condition: Good

Prescriptions / Home Meds:
New
  methylprednisolone [Medrol (Caden)] 4 mg tablets,dose pack 
   See Rx Instructions  .ROUTE .COMPLEX Qty: 21 0RF
   Rx Instructions:
   Taper as directed
  methocarbamol 750 mg tablet 
   750 mg PO TID PRN (Reason: pain) Qty: 20 0RF
  brompheniramine-pseudoeph-DM [Bromfed DM] 2-30-10 mg/5 mL syrup 
   10 ml PO Q6H PRN (Reason: cold symptoms) Qty: 200 0RF

No Action
  simvastatin 20 mg tablet 
   20 mg PO DAILY 
  levetiracetam 100 mg/mL solution 
   750 mg PO BID 
  aspirin 81 mg capsule 
   81 mg PO DAILY 

Instructions:  Upper Respiratory Infection (ED), Acute Neck Pain (ED)

Stand Alone Forms:  Portal Instructions

Referrals:
BREONNA MCMULLEN [Primary Care Provider] - 1 week

Discharge Date/Time: 01/16/24 16:44


___________________________________________________________________________

Documented by User:  Parth Epstein MD  01/16/24 19:42

HPI - Neck Pain/Injury
General
Chief Complaint: Neck Pain/Injury
Stated Complaint: Neck Pain
Time Seen by Provider: 01/16/24 15:46
Related Data
Home Medications

 Medication  Instructions  Recorded  Confirmed
aspirin 81 mg capsule 81 mg PO DAILY 09/23/23 01/16/24
levetiracetam 100 mg/mL oral 750 mg PO BID 09/23/23 01/16/24
solution   
simvastatin 20 mg tablet 20 mg PO DAILY 01/16/24 01/16/24

Previous Rx's

 Medication  Instructions  Recorded
brompheniramine-pseudoephedrine-DM 10 ml PO Q6H PRN cold symptoms 01/16/24
2 mg-30 mg-10 mg/5 mL oral syrup #200 mL 
(Bromfed DM)  
methocarbamol 750 mg tablet 750 mg PO TID PRN pain #20 tabs 01/16/24
methylprednisolone 4 mg tablets in See Rx Instructions .Route 01/16/24
a dose pack (Medrol (Caden)) .COMPLEX #21 ea 


Allergies

Allergy/AdvReac Type Severity Reaction Status Date / Time
topiramate [From Topamax] AdvReac Severe suicidal Verified 10/29/23 12:40
diphenhydramine AdvReac Intermediate Hypertensio Verified 10/29/23 12:40
[From Benadryl]   n  



[facility]
Medical History (Updated 01/16/24 @ 16:36 by PONCE Ariza)

Epilepsy
 ?G40.909 - Epilepsy, unspecified, not intractable, without status epilepticus (ICD-10)


Social History (Reviewed 01/16/24 @ 16:05 by PONCE Ariza)
Smoking status:  Current every day smoker 



Exam
Constitutional
Vital Signs, click to edit/add: 

Last Vital Signs

Temp  99.3 F   01/16/24 15:48
Pulse  63   01/16/24 15:48
Resp  18   01/16/24 15:48
BP  135/95 H  01/16/24 15:48
Pulse Ox  98   01/16/24 15:57
O2 Del Method  Room Air  01/16/24 15:57




Course
Vital Signs
Vital signs: 

Vital Signs

Temperature  99.3 F   01/16/24 15:48
Pulse Rate  63   01/16/24 15:48
Respiratory Rate  18   01/16/24 15:48
Blood Pressure  135/95 H  01/16/24 15:48
Pulse Oximetry  97   01/16/24 15:48
Oxygen Delivery Method  Room Air  01/16/24 15:48



Temperature  99.3 F   01/16/24 15:48
Pulse Rate  63   01/16/24 15:48
Respiratory Rate  18   01/16/24 15:48
Blood Pressure  135/95 H  01/16/24 15:48
Pulse Oximetry  98   01/16/24 15:57
Oxygen Delivery Method  Room Air  01/16/24 15:57




MDM - Neck Pain/Injury
MDM Narrative
Medical decision making narrative: 
I discussed with the patient there is no indication for COVID or influenza testing at this time as he has stable vital signs and has had mild upper respiratory symptoms for the past week, he is out of the window for quarantine for COVID or flu.  He 
has clear lung sounds with no wheezing or rhonchi.  He is sent for x-rays of the cervical spine and chest to rule out acute bony abnormality or pneumonia.  He is resting comfortably and maintained stable vital signs in the ER. He has no complaints 
of pain in the ER.

I, Dr Epstein, have reviewed the above progress note and course of action in the ER; agree with the above. I have gone over history and physical, and discussed disposition and treatment plan with the patient.

Imaging Data
Chest x-ray: 
      Radiologist's impression: 

ITS Impressions

Cervical Spine X-Ray  01/16/24 16:03
IMPRESSION: 
 
Degenerative changes of the cervical spine at C5-C6 and C6-C7.
 
 
Electronically authenticated by: CHANDLER XIONG   Date: 1/16/2024  16:26


Chest X-Ray  01/16/24 16:03
IMPRESSION: 
 
No acute abnormality identified.
 
 
 
 
Electronically authenticated by: DOC DESAI   Date: 1/16/2024  16:29





Discharge Plan
Discharge
Chief Complaint: Neck Pain/Injury

Clinical Impression:
 Acute neck pain, Upper respiratory infection


Patient Disposition: Home, Self-Care

Time of Disposition Decision: 16:35

Condition: Good

Prescriptions / Home Meds:
New
  methylprednisolone [Medrol (Caden)] 4 mg tablets,dose pack 
   See Rx Instructions  .ROUTE .COMPLEX Qty: 21 0RF
   Rx Instructions:
   Taper as directed
  methocarbamol 750 mg tablet 
   750 mg PO TID PRN (Reason: pain) Qty: 20 0RF
  brompheniramine-pseudoeph-DM [Bromfed DM] 2-30-10 mg/5 mL syrup 
   10 ml PO Q6H PRN (Reason: cold symptoms) Qty: 200 0RF

No Action
  simvastatin 20 mg tablet 
   20 mg PO DAILY 
  levetiracetam 100 mg/mL solution 
   750 mg PO BID 
  aspirin 81 mg capsule 
   81 mg PO DAILY 

Instructions:  Upper Respiratory Infection (ED), Acute Neck Pain (ED)

Stand Alone Forms:  Portal Instructions

Referrals:
BREONNA MCMULLEN [Primary Care Provider] - 1 week

Discharge Date/Time: 01/16/24 16:44

## 2024-01-16 NOTE — XR_ITS
The [address] [address] 
     [phone] 
  
  
Patient Name: 
CANDE GANDHI 
  
MRN: [id number]    YOB: 1980    Sex: M 
Assigned Patient Location: ER 
Current Patient Location: ER 
Accession/Order Number: [id number] 
Exam Date: 1/16/2024  16:10    Report Date: 1/16/2024  16:29 
  
At the request of: 
SELIN ROMANO   
  
Procedure:  XR chest 2V 
  
TWO-VIEW CHEST RADIOGRAPH, 1/16/2024 4:10 PM EST: 
  
COMPARISON: Chest, 9/23/2023. 
  
CLINICAL HISTORY: Cough. 
  
FINDINGS: No acute cardiopulmonary disease. No pulmonary edema, pneumothorax,  
or pleural effusion. Normal heart size. No acute osseous abnormality.  
  
ORDER #: [id number] XR/XR chest 2V  
IMPRESSION:   
   
No acute abnormality identified.  
   
   
   
   
Electronically authenticated by: DOC DESAI   Date: 1/16/2024  16:29

## 2024-01-16 NOTE — ED_ITS
Documented by User:  PONCE Ariaz  01/16/24 16:39    
    
HPI - Neck Pain/Injury    
General    
Chief Complaint: Neck Pain/Injury    
Stated Complaint: Neck Pain    
Time Seen by Provider: 01/16/24 15:46    
Source: patient    
Mode of arrival: walk-in    
History of Present Illness    
HPI Narrative:     
Patient is a 43-year-old male who presents to the emergency department for the   
evaluation of multiple complaints.  He states his primary reason for coming to   
the emergency department was because he was at work when he felt a sharp pain at  
the base of the skull at the top of the left side of the cervical spine.  He   
denies any mechanism of injury or trauma.  He states that sharp pain occurred 2   
more times but has completely resolved at this time and he is no longer having   
any discomfort.  He denies visual changes, nausea, vomiting.  He has no pain   
radiation into the extremities, numbness, tingling.  He further complains of   
upper respiratory symptoms of cough and congestion for the last week.  He states  
he stopped taking his allergy medication and has noticed the symptoms.  He   
states he is intermittently short of breath when he is coughing vigorously.  He   
is resting comfortably and states that this time he has no chest pain, shortness  
of breath, he has not had any fevers.    
Related Data    
                                Home Medications    
    
    
    
 Medication  Instructions  Recorded  Confirmed    
     
aspirin 81 mg capsule 81 mg PO DAILY 09/23/23 01/16/24    
     
levetiracetam 100 mg/mL oral 750 mg PO BID 09/23/23 01/16/24    
    
solution       
     
simvastatin 20 mg tablet 20 mg PO DAILY 01/16/24 01/16/24    
    
    
                                  Previous Rx's    
    
    
    
 Medication  Instructions  Recorded    
     
brompheniramine-pseudoephedrine-DM 10 ml PO Q6H PRN cold symptoms 01/16/24    
    
2 mg-30 mg-10 mg/5 mL oral syrup #200 mL     
    
(Bromfed DM)      
     
methocarbamol 750 mg tablet 750 mg PO TID PRN pain #20 tabs 01/16/24    
     
methylprednisolone 4 mg tablets in See Rx Instructions .Route 01/16/24    
    
a dose pack (Medrol (Caden)) .COMPLEX #21 ea     
    
    
    
                                    Allergies    
    
    
    
Allergy/AdvReac Type Severity Reaction Status Date / Time    
     
topiramate [From Topamax] AdvReac Severe suicidal Verified 10/29/23 12:40    
     
diphenhydramine AdvReac Intermediate Hypertensio Verified 10/29/23 12:40    
    
[From Benadryl]   n      
    
    
    
    
Review of Systems    
    
    
ROS      
    
 Constitutional Denies: fever or chills       
    
 Ears, nose, mouth, and throat Reports: ear pain and nasal congestion; Denies:   
throat pain       
    
 Cardiovascular Denies: chest pain       
    
 Respiratory Reports: shortness of breath and cough; Denies: wheezing       
    
 Gastrointestinal Denies: nausea, vomiting or diarrhea       
    
 Genitourinary Denies: painful urination       
    
 Musculoskeletal Denies: back pain or extremity swelling       
    
 Integumentary/Breast Denies: rash       
    
 Neurological Denies: headache       
    
    
[facility]    
[facility]    
Medical History (Updated 01/16/24 @ 16:36 by PONCE Ariza)    
    
Epilepsy    
   ?G40.909 - Epilepsy, unspecified, not intractable, without status epilepticus  
   (ICD-10)    
    
    
Social History (Reviewed 01/16/24 @ 16:05 by PONCE Ariza)    
Smoking status:  Current every day smoker     
    
    
    
Exam    
Narrative    
Exam Narrative:     
Gen.: Awake, alert, in no distress    
Head: Normocephalic, atraumatic    
ENT: Moist mucous membranes, Bilateral TMs obscured by dark wax, no pharyngeal   
erythema.  Clear speech. No bony point tenderness of the cervical spine.  No   
tenderness of the paraspinal muscles of the cervical spine.  Normal range of   
motion, no nuchal rigidity    
Respiratory: No respiratory distress, lungs clear bilaterally, No wheezing or   
rhonchi    
Cardio: Regular rate and rhythm    
Extremities: Moves extremities equally, no injuries noted    
Psych: Normal mood and affect    
Neuro: No focal neuro deficit    
Skin: Warm, dry, intact    
    
Constitutional    
Vital Signs, click to edit/add:     
    
                                Last Vital Signs    
    
    
    
Temp  99.3 F   01/16/24 15:48    
     
Pulse  63   01/16/24 15:48    
     
Resp  18   01/16/24 15:48    
     
BP  135/95 H  01/16/24 15:48    
     
Pulse Ox  98   01/16/24 15:57    
     
O2 Del Method  Room Air  01/16/24 15:57    
    
    
    
    
    
Course    
Vital Signs    
Vital signs:     
    
                                   Vital Signs    
    
    
    
Temperature  99.3 F   01/16/24 15:48    
     
Pulse Rate  63   01/16/24 15:48    
     
Respiratory Rate  18   01/16/24 15:48    
     
Blood Pressure  135/95 H  01/16/24 15:48    
     
Pulse Oximetry  97   01/16/24 15:48    
     
Oxygen Delivery Method  Room Air  01/16/24 15:48    
    
    
                                            
    
    
    
Temperature  99.3 F   01/16/24 15:48    
     
Pulse Rate  63   01/16/24 15:48    
     
Respiratory Rate  18   01/16/24 15:48    
     
Blood Pressure  135/95 H  01/16/24 15:48    
     
Pulse Oximetry  98   01/16/24 15:57    
     
Oxygen Delivery Method  Room Air  01/16/24 15:57    
    
    
    
    
    
MDM - Neck Pain/Injury    
MDM Narrative    
Medical decision making narrative:     
I discussed with the patient there is no indication for COVID or influenza   
testing at this time as he has stable vital signs and has had mild upper   
respiratory symptoms for the past week, he is out of the window for quarantine   
for COVID or flu.  He has clear lung sounds with no wheezing or rhonchi.  He is   
sent for x-rays of the cervical spine and chest to rule out acute bony   
abnormality or pneumonia.  He is resting comfortably and maintained stable vital  
signs in the ER. He has no complaints of pain in the ER.    
Medical Records    
Attestation: I reviewed the patient's medical records.    
Imaging Data    
Chest x-ray:     
      Attestation: I have reviewed the pertinent imaging results.    
      Radiologist's impression:     
    
ITS Impressions    
    
Cervical Spine X-Ray  01/16/24 16:03    
IMPRESSION:     
     
Degenerative changes of the cervical spine at C5-C6 and C6-C7.    
     
     
Electronically authenticated by: CHANDLER XIONG   Date: 1/16/2024  16:26    
    
    
Chest X-Ray  01/16/24 16:03    
IMPRESSION:     
     
No acute abnormality identified.    
     
     
     
     
Electronically authenticated by: DOC DESAI   Date: 1/16/2024  16:29    
    
    
    
    
    
Discharge Plan    
Discharge    
Chief Complaint: Neck Pain/Injury    
    
Clinical Impression:    
 Acute neck pain, Upper respiratory infection    
    
    
Patient Disposition: Home, Self-Care    
    
Time of Disposition Decision: 16:35    
    
Condition: Good    
    
Prescriptions / Home Meds:    
New    
  methylprednisolone [Medrol (Caden)] 4 mg tablets,dose pack     
   See Rx Instructions  .ROUTE .COMPLEX Qty: 21 0RF    
   Rx Instructions:    
   Taper as directed    
  methocarbamol 750 mg tablet     
   750 mg PO TID PRN (Reason: pain) Qty: 20 0RF    
  brompheniramine-pseudoeph-DM [Bromfed DM] 2-30-10 mg/5 mL syrup     
   10 ml PO Q6H PRN (Reason: cold symptoms) Qty: 200 0RF    
    
No Action    
  simvastatin 20 mg tablet     
   20 mg PO DAILY     
  levetiracetam 100 mg/mL solution     
   750 mg PO BID     
  aspirin 81 mg capsule     
   81 mg PO DAILY     
    
Instructions:  Upper Respiratory Infection (ED), Acute Neck Pain (ED)    
    
Stand Alone Forms:  Portal Instructions    
    
Referrals:    
BREONNA MCMULLEN [Primary Care Provider] - 1 week    
    
Discharge Date/Time: 01/16/24 16:44    
    
    
___________________________________________________________________________    
    
Documented by User:  Parth Epstein MD  01/16/24 19:42    
    
HPI - Neck Pain/Injury    
General    
Chief Complaint: Neck Pain/Injury    
Stated Complaint: Neck Pain    
Time Seen by Provider: 01/16/24 15:46    
Related Data    
                                Home Medications    
    
    
    
 Medication  Instructions  Recorded  Confirmed    
     
aspirin 81 mg capsule 81 mg PO DAILY 09/23/23 01/16/24    
     
levetiracetam 100 mg/mL oral 750 mg PO BID 09/23/23 01/16/24    
    
solution       
     
simvastatin 20 mg tablet 20 mg PO DAILY 01/16/24 01/16/24    
    
    
                                  Previous Rx's    
    
    
    
 Medication  Instructions  Recorded    
     
brompheniramine-pseudoephedrine-DM 10 ml PO Q6H PRN cold symptoms 01/16/24    
    
2 mg-30 mg-10 mg/5 mL oral syrup #200 mL     
    
(Bromfed DM)      
     
methocarbamol 750 mg tablet 750 mg PO TID PRN pain #20 tabs 01/16/24    
     
methylprednisolone 4 mg tablets in See Rx Instructions .Route 01/16/24    
    
a dose pack (Medrol (Caden)) .COMPLEX #21 ea     
    
    
    
                                    Allergies    
    
    
    
Allergy/AdvReac Type Severity Reaction Status Date / Time    
     
topiramate [From Topamax] AdvReac Severe suicidal Verified 10/29/23 12:40    
     
diphenhydramine AdvReac Intermediate Hypertensio Verified 10/29/23 12:40    
    
[From Benadryl]   n      
    
    
    
    
[facility]    
[facility]    
Medical History (Updated 01/16/24 @ 16:36 by PONCE Ariza)    
    
Epilepsy    
   ?G40.909 - Epilepsy, unspecified, not intractable, without status epilepticus  
   (ICD-10)    
    
    
Social History (Reviewed 01/16/24 @ 16:05 by PONCE Ariza)    
Smoking status:  Current every day smoker     
    
    
    
Exam    
Constitutional    
Vital Signs, click to edit/add:     
    
                                Last Vital Signs    
    
    
    
Temp  99.3 F   01/16/24 15:48    
     
Pulse  63   01/16/24 15:48    
     
Resp  18   01/16/24 15:48    
     
BP  135/95 H  01/16/24 15:48    
     
Pulse Ox  98   01/16/24 15:57    
     
O2 Del Method  Room Air  01/16/24 15:57    
    
    
    
    
    
Course    
Vital Signs    
Vital signs:     
    
                                   Vital Signs    
    
    
    
Temperature  99.3 F   01/16/24 15:48    
     
Pulse Rate  63   01/16/24 15:48    
     
Respiratory Rate  18   01/16/24 15:48    
     
Blood Pressure  135/95 H  01/16/24 15:48    
     
Pulse Oximetry  97   01/16/24 15:48    
     
Oxygen Delivery Method  Room Air  01/16/24 15:48    
    
    
                                            
    
    
    
Temperature  99.3 F   01/16/24 15:48    
     
Pulse Rate  63   01/16/24 15:48    
     
Respiratory Rate  18   01/16/24 15:48    
     
Blood Pressure  135/95 H  01/16/24 15:48    
     
Pulse Oximetry  98   01/16/24 15:57    
     
Oxygen Delivery Method  Room Air  01/16/24 15:57    
    
    
    
    
    
MDM - Neck Pain/Injury    
MDM Narrative    
Medical decision making narrative:     
I discussed with the patient there is no indication for COVID or influenza   
testing at this time as he has stable vital signs and has had mild upper   
respiratory symptoms for the past week, he is out of the window for quarantine   
for COVID or flu.  He has clear lung sounds with no wheezing or rhonchi.  He is   
sent for x-rays of the cervical spine and chest to rule out acute bony   
abnormality or pneumonia.  He is resting comfortably and maintained stable vital  
signs in the ER. He has no complaints of pain in the ER.    
    
I, Dr Epstein, have reviewed the above progress note and course of action in the ER;  
agree with the above. I have gone over history and physical, and discussed   
disposition and treatment plan with the patient.    
    
Imaging Data    
Chest x-ray:     
      Radiologist's impression:     
    
ITS Impressions    
    
Cervical Spine X-Ray  01/16/24 16:03    
IMPRESSION:     
     
Degenerative changes of the cervical spine at C5-C6 and C6-C7.    
     
     
Electronically authenticated by: CHANDLER XIONG   Date: 1/16/2024  16:26    
    
    
Chest X-Ray  01/16/24 16:03    
IMPRESSION:     
     
No acute abnormality identified.    
     
     
     
     
Electronically authenticated by: DOC DESAI   Date: 1/16/2024  16:29    
    
    
    
    
    
Discharge Plan    
Discharge    
Chief Complaint: Neck Pain/Injury    
    
Clinical Impression:    
 Acute neck pain, Upper respiratory infection    
    
    
Patient Disposition: Home, Self-Care    
    
Time of Disposition Decision: 16:35    
    
Condition: Good    
    
Prescriptions / Home Meds:    
New    
  methylprednisolone [Medrol (Caden)] 4 mg tablets,dose pack     
   See Rx Instructions  .ROUTE .COMPLEX Qty: 21 0RF    
   Rx Instructions:    
   Taper as directed    
  methocarbamol 750 mg tablet     
   750 mg PO TID PRN (Reason: pain) Qty: 20 0RF    
  brompheniramine-pseudoeph-DM [Bromfed DM] 2-30-10 mg/5 mL syrup     
   10 ml PO Q6H PRN (Reason: cold symptoms) Qty: 200 0RF    
    
No Action    
  simvastatin 20 mg tablet     
   20 mg PO DAILY     
  levetiracetam 100 mg/mL solution     
   750 mg PO BID     
  aspirin 81 mg capsule     
   81 mg PO DAILY     
    
Instructions:  Upper Respiratory Infection (ED), Acute Neck Pain (ED)    
    
Stand Alone Forms:  Portal Instructions    
    
Referrals:    
BREONNA MCMULLEN [Primary Care Provider] - 1 week    
    
Discharge Date/Time: 01/16/24 16:44

## 2024-01-16 NOTE — XMS_ITS
Comprehensive CCD (C-CDA v2.1)  
  
                          Created on: 2024  
  
  
NATANAEL GANDHI  
External Reference #: [id number]:[id number]  
: 1980  
Sex: Male  
  
Demographics  
  
  
                                        Address             [address] [place]  [address]  
   
                                        Home Phone          [phone]  
   
                                        Mobile Phone        [phone]  
   
                                        Preferred Language  en  
   
                                        Marital Status      Unknown  
   
                                        Pentec[employer]al Affiliation Unknown  
   
                                        Race                White  
   
                                        Ethnic Group        Not  or Lati  
no  
  
  
Author  
  
  
                                        Name                Unknown  
   
                                        Address             [address]  
[address]  
[place]  [address]  
   
                                        Phone               [phone]  
   
                                        Organization        [facility]  
  
  
Care Team Providers  
  
  
                                Care Team Member Name Role            Phone  
   
                                Breonna Mcmullen  Unavailable     [phone]  
   
                                Unavailable     Unavailable     [phone]  
   
                                Yvrose Kc   Unavailable     [phone]  
   
                                DEMI PAYNE Attending       Unavailable  
   
                                BREONNA MCMULLEN  Primary Care    Unavailable  
   
                                Debra, Dr. Booker Attending       Unavaila  
amanda Richardson, Dr. Booker Referring       Unavaila  
Breonna Perea Primary Care    Unavailable  
   
                                KEMI, DR RAVI Admitting       Unavailable  
   
                                KEMI, DR RAVI Attending       Unavailable  
   
                                KEMI, DR RAVI Primary Care    Unavailable  
   
                                AMINA GONZALEZ Attending       Unavailable  
   
                                KEMI, DR RAVI Primary Care    Unavailable  
   
                                AMINA GONZALEZ Consulting      Unavailable  
   
                                AMINA GONZALEZ Admitting       Unavailable  
   
                                TUTU MCCLOUD Admitting       Unavailable  
   
                                MAXIMUS, DR EDILBERTO GALE Consulting      Unavailable  
   
                                [facility], DR CAM Primary Care    Unavailable  
   
                                TUTU MCCLOUD Attending       Unavailable  
   
                                TUTU MCCLOUD Consulting      Unavailable  
   
                                KEMI, DR RAVI Primary Care    Unavailable  
   
                                TUTU MCCLOUD Admitting       Unavailable  
   
                                TUTU MCCLOUD Attending       Unavailable  
   
                                KATERIN .BERNARDINO    Consulting      Unavailable  
   
                                KEMI, DR RAVI Primary Care    Unavailable  
   
                                DIAB ., STACY  Admitting       Unavailable  
   
                                DIAB ., STACY  Attending       Unavailable  
   
                                DIAB ., STACY  Consulting      Unavailable  
   
                                SADAF LIM  Consulting      Unavailable  
   
                                EUGENIA, DR NATANAEL COSTA Admitting       Unavailreno MCMULLEN, DR RAVI Primary Care    Unavailable  
   
                                EUGENIA, DR NATANAEL COSTA Attending       UnavailTORI Alfaro Consulting      Unavailable  
   
                                TORI STRINGER  Consulting      Unavailable  
   
                                Sangita Moscoso   Unavailable     [phone]  
   
                                MD Breonna Mcmullen Primary Care Provider [phone]  
   
                                ARNOL White Attending Provider [phone]  
   
                                Kary White Unavailable     [phone]  
   
                                KARY BENITEZ Referring       Unavailable  
   
                                BREONNA MCMULLEN  Referring       Unavailable  
   
                                KARY BENITEZ Attending       Unavailable  
   
                                Kary White Attending       Unavailable  
   
                                Kary White Admitting       Unavailable  
   
                                Breonna Mcmullen    Primary Care    Unavailable  
   
                                Isreal Bautista    Unavailable     [phone]  
  
  
  
Allergies  
  
  
                                                    Allergy   
Classification                          Reported   
Allergen(s)                             Allergy   
Type                                    Date of   
Onset                     Reaction(s)               Facility  
   
                                                      
(4 sources)                             diphenhydrAMINE  
; Translations:   
[Benadryl TABS]                         Drug   
Allergy                                             Hypertension,   
hyperactivity                           -[facility]   
[facility]   
250 DO  
Work Phone:   
[phone]  
   
                                                      
(1 source)                              topiramate;   
Translations:   
[Topiragen   
TABS]                                   Drug   
Allergy                                 Depression          [facility]   
250 DO  
Work Phone:   
[phone]  
   
                                                      
(2 sources)                             diphenhydrAMINE  
; Translations:   
[Benadryl]                              Drug   
Allergy                                 hyperactivity       [facility]   
Repository  
   
                                                      
(4 sources)               topiramate                Drug   
Allergy                                 suicidal            [facility]   
[facility]   
[facility]  
Other Phone:   
[phone]  
   
                                                      
(4 sources)               Bee Sting                 Drug   
allergy                                 Unknown             [facility]   
Professional   
Corporation  
Other Phone:   
[phone]  
   
                                                      
(1 source)                predniSONE                Drug   
Allergy                                   
3                                                   [facility]   
Repository  
   
                                                      
(1 source)                topiramate                Drug   
Allergy                                                     [facility]   
Repository  
  
  
  
Medications  
Current Medications  
  
  
  
                      Medication Drug Class(es) Dates      Sig (Normalized) Sig   
(Original)  
   
                                                    Aspir-81  
(4 sources)                                                     Aspir-81 Active  
   
                                                    levETIRAcetam  
(8 sources)                                                 take 7.5 mL by mouth  
 every   
twelve hours as needed                  Keppra 100 MG/ML 7.5 ml   
Orally every 12 hrs for   
30 day(s) Not-Taking/PRN  
  
  
  
                                                                levETIRAcetam Ac  
tive  
  
  
  
Completed/Discontinued Medications  
  
  
  
                      Medication Drug Class(es) Dates      Sig (Normalized) Sig   
(Original)  
   
                                                    aspirin 81 mg delayed   
release oral tablet  
(1 source)                              Platelet   
Aggregation   
Inhibitor,   
Nonsteroidal   
Anti-inflammatory   
Drug                                                take 1 tablet by   
mouth once daily                        Aspirin EC 81   
MG Oral Tablet   
Delayed Release   
TAKE 1 TABLET   
DAILY.   
Quantity: 90   
Refills: 3   
Ordered:   
2022   
Jhony Richardson MD Active   
fill only if   
requested  
   
                                                    brompheniramine   
maleate 0.4 mg/ml /   
dextromethorphan   
hydrobromide 2 mg/ml /   
pseudoephedrine   
hydrochloride 6 mg/ml   
oral solution  
(3 sources)                             alpha-Adrenergic   
Agonist,   
Uncompetitive   
N-methyl-D-aspartat  
e Receptor   
Antagonist, Sigma-1   
Agonist                                 Start:   
2023                              take 10 mL by   
mouth every six   
hours as needed                         Pseudoeph-Bromp  
hen-DM 30-2-10   
MG/5ML 10 mL   
Orally every 6   
hours for 5   
days 12 Sep,   
2023   
Not-Taking/PRN  
   
                                                    loratadine 1 mg/ml   
oral solution  
(4 sources)                                                 take 10 mL by   
mouth once daily   
as needed                               Claritin 5   
MG/5ML 10 ml   
Orally Once a   
day for 30   
day(s)   
Not-Taking/PRN  
  
  
  
                                                take 10 mL by mouth once daily C  
laritin 5 MG/5ML 10 ml Orally Once a day for 30  
   
day(s) Active  
  
  
  
                                                    predniSONE 20 mg   
oral tablet  
(3 sources)                             Start: 2023   take 1 tablet   
by mouth every   
twelve hours                            prednisone 20 MG 1   
tablet Orally BID   
for 5 12 Sep, 2023   
Not-Taking/PRN  
   
                                                    simvastatin 20 mg   
oral tablet  
(5 sources)                             HMG-CoA   
Reductase   
Inhibitor                 Start: 2021         take 1 tablet   
by mouth once   
daily in the   
evening                                 Simvastatin 20 MG   
Oral Tablet TAKE ONE   
TABLET BY MOUTH ONCE   
DAILY IN THE EVENING   
Quantity: 90   
Refills: 3 Ordered:   
2022   
Jhony Richardson MD Start :   
2-Aug-2021 Active  
  
  
  
Problems  
Active Problems  
  
  
                      Problem Classification Problem    Date       Documented Da  
te Episodic/Chronic  
   
                                                    Acquired foot   
deformities  
(1 source)                              Other deformities   
of toe(s)   
(acquired), right   
foot                                                        Episodic  
   
                                                    Acquired foot   
deformities  
(1 source)                              Other deformities   
of toe(s)   
(acquired), left   
foot                                                        Episodic  
   
                                                    Cardiac dysrhythmias  
(1 source)                              Paroxysmal atrial   
fibrillation;   
Translations:   
[Atrial   
fibrillation]                                               Chronic  
   
                                                    Cardiac dysrhythmias  
(2 sources)                             Palpitations;   
Translations:   
[Palpitations]                                              Episodic  
   
                                                    Crushing injury or   
internal injury  
(4 sources)                             Crushing injury of   
left ring finger,   
initial encounter;   
Translations:   
[CRUSHING INJURY LT   
RING FINGER INIT]                       Onset:   
2023                                          Episodic  
   
                                                    Disorders of lipid   
metabolism  
(1 source)                              Hypertriglyceridemi  
a; Translations:   
[Pure   
hyperglyceridemia]                                          Chronic  
   
                                                    E Codes: Other   
specified and   
classifiable  
(1 source)                              Caught, crushed,   
jammed, or pinched   
between moving   
objects, initial   
encounter;   
Translations:   
[CAUGHT CRUSH/PINCH   
BTWN MOV OBJ INT]                       Onset:   
2023                                          Episodic  
   
                                                    Epilepsy; convulsions  
(1 source)                              Seizure disorder;   
Translations:   
[Epilepsy,   
unspecified,   
without mention of   
intractable   
epilepsy]                                                   Chronic  
   
                                                    Influenza  
(1 source)                              Influenza due to   
other identified   
influenza virus   
with other   
respiratory   
manifestations;   
Translations: [FLU   
D/T OTH ID FLU VIR   
OTH RSP MANF]                           Onset:   
2022                                          Episodic  
   
                                                    Other acquired   
deformities  
(1 source)                              Contracture,   
unspecified foot;   
Translations:   
[CONTRACTURE   
UNSPECIFIED FOOT]                       Onset:   
2022                                          Chronic  
   
                                                    Other aftercare  
(1 source)                              Long term (current)   
use of aspirin;   
Translations: [LONG   
TERM CURRENT USE OF   
ASPIRIN]                                Onset:   
2022                                          Episodic  
   
                                                    Other aftercare  
(1 source)                              Other long term   
(current) drug   
therapy;   
Translations: [OTH   
LONG TERM CURRENT   
DRUG THERAPY]                           Onset:   
2022                                          Episodic  
   
                                                    Other lower   
respiratory disease  
(1 source)                              Dyspnea;   
Translations:   
[Shortness of   
breath]                                                     Episodic  
   
                                                    Other nervous system   
disorders  
(1 source)                              Chronic pain;   
Translations:   
[Other chronic   
pain]                                                       Chronic  
   
                                                    Other nervous system   
disorders  
(1 source)      Other chronic pain                                 Chronic  
   
                                                    Other nutritional;   
endocrine; and   
metabolic disorders  
(1 source)                              Obesity;   
Translations:   
[Obesity,   
unspecified]                                                Chronic  
   
                                                    Other upper   
respiratory infections  
(2 sources)                             Acute pharyngitis,   
unspecified;   
Translations:   
[Acute upper   
respiratory   
infection,   
unspecified]                                                Episodic  
   
                                                    Residual codes;   
unclassified  
(1 source)                              Sleep apnea;   
Translations:   
[Unspecified sleep   
apnea]                                                      Chronic  
   
                                                    Residual codes;   
unclassified  
(4 sources)                             Obstructive sleep   
apnea syndrome;   
Translations:   
[Obstructive sleep   
apnea (adult)   
(pediatric)]                                                Chronic  
   
                                                    Residual codes;   
unclassified  
(1 source)                              Obstructive sleep   
apnea (adult)   
(pediatric);   
Translations:   
[Obstructive sleep   
apnea G47.33]                           Onset:   
2021  
Resolved:   
2021                                          Chronic  
   
                                                    Spondylosis;   
intervertebral disc   
disorders; other back   
problems  
(4 sources)                             Inflammation of   
sacroiliac joint;   
Translations:   
[Sacroiliitis, not   
elsewhere   
classified]                                                 Chronic  
   
                                                    Spondylosis;   
intervertebral disc   
disorders; other back   
problems  
(4 sources)                             Sciatica, right   
side; Translations:   
[Radiculopathy,   
lumbar region]                          Onset:   
2022                                          Episodic  
   
                                                    Sprains and strains  
(5 sources)                             Lumbosacral strain;   
Translations:   
[Lumbosacral   
strain]                                 Onset:   
2022                                          Episodic  
   
                                                    Substance-related   
disorders  
(2 sources)                             Occasional tobacco   
smoker;   
Translations:   
[Tobacco use   
disorder]                               Onset:   
2022                                          Chronic  
   
                                                    Superficial injury;   
contusion  
(1 source)                              Contusion of left   
ring finger with   
damage to nail,   
initial encounter;   
Translations:   
[CONTUS LT RING   
FINGR DAMGE NAIL   
INT]                                    Onset:   
2023                                          Episodic  
   
                                                    Unclassified  
(2 sources)                             COUGH, UNSPECIFIED;   
Translations:   
[COUGH,   
UNSPECIFIED]                            Onset:   
2022                                            
   
                                                    Unclassified  
(1 source)                              CONTACT W/AND   
(SUSP) EXPOS   
COVID-19;   
Translations:   
[CONTACT W/AND   
(SUSP) EXPOS   
COVID-19]                               Onset:   
2022                                            
  
  
Past or Other Problems  
  
  
                                                    Problem   
Classification  Problem         Date            Documented Date Episodic/Chronic  
   
                                                    E Codes:   
Natural/environment  
(1 source)                              Other and   
unspecified   
overexertion or   
strenuous movements   
or postures, initial   
encounter;   
Translations: [OTH   
AND UNS OVREXRT/STRN   
MVMT/POS INT]                           Onset:   
2022                                          Episodic  
   
                                                    E Codes: Unspecified  
(1 source)                              Activity, digging,   
shoveling and   
raking;   
Translations:   
[ACTIVITY DIGGING   
SHOVELING AND   
RAKING]                                 Onset:   
2022                                          Episodic  
   
                                                    Malaise and fatigue  
(1 source)                              Weakness;   
Translations:   
[WEAKNESS]                              Onset:   
2022                                          Episodic  
   
                                                    Other connective   
tissue disease  
(4 sources)                             Plantar fascial   
fibromatosis;   
Translations:   
[PLANTAR FASCIAL   
FIBROMATOSIS]                           Onset:   
2022                                          Episodic  
   
                                                    Other connective   
tissue disease  
(1 source)                              Pain in right foot;   
Translations: [PAIN   
IN RIGHT FOOT]                          Onset:   
2022                                          Episodic  
   
                                                    Other connective   
tissue disease  
(1 source)                              Pain in left foot;   
Translations: [PAIN   
IN LEFT FOOT]                           Onset:   
2022                                          Episodic  
   
                                                    Other nervous system   
disorders  
(1 source)                              Unspecified   
abnormalities of   
gait and mobility;   
Translations: [UNS   
ABNORMALITIES GAIT   
AND MOBILITY]                           Onset:   
2022                                          Episodic  
   
                                                    Other non-traumatic   
joint disorders  
(4 sources)                             Pain in right   
shoulder;   
Translations: [PAIN   
IN RIGHT SHOULDER]                      Onset:   
2022                                          Episodic  
   
                                                    Other non-traumatic   
joint disorders  
(4 sources)                             Pain in right ankle   
and joints of right   
foot; Translations:   
[PAIN IN RIGHT   
ANKLE]                                  Onset:   
2022                                          Episodic  
   
                                                    Other non-traumatic   
joint disorders  
(1 source)                              Pain in left ankle   
and joints of left   
foot; Translations:   
[PAIN IN LEFT ANKLE]                    Onset:   
2022                                          Episodic  
   
                                                    Other nutritional;   
endocrine; and   
metabolic disorders  
(1 source)                              Body mass index 30+   
- obesity;   
Translations:   
[Obesity,   
unspecified]                              
Resolved:   
2022                                          Chronic  
   
                                                    Screening and history   
of mental health and   
substance abuse codes  
(2 sources)                             Ex-smoker;   
Translations:   
[Personal history of   
tobacco use]                            Onset:   
2022                                          Episodic  
   
                                                    Unclassified  
(1 source)                              COUGH, UNSPECIFIED;   
Translations:   
[COUGH, UNSPECIFIED]                    Onset:   
2022                                            
  
  
  
Results  
  
  
                          Test Name    Value        Interpretation Reference   
Range                                   Facility  
   
                                                    XR lumbar spine 6V w bending  
on 2023   
   
                                                    XR lumbar spine 6V   
w bending                               [facility]  
[address] [place] [address]  
  
XRay Report  
Signed  
  
Patient:   
Natanael Gandhi   
MR#: [id number]  
[id number]  
: 1980   
Acct:[id number]  
  
Age/Sex: 43 / M ADM   
Date: 23  
  
Loc: X Room: Type:   
[facility]  
Attending Dr:   
Kary AMBROSE  
Copies to: ARNOL Dougherty  
  
  
  
Ordering Provider:   
ARNOL Dougherty  
Date of Service:   
23  
Accession #:   
([id number]) XR/XR   
lumbar spine 6V w   
bending: M54.16  
  
  
  
  
XR lumbar spine 6V w   
bending 2023   
11:49 AM  
  
SIGNS AND SYMPTOMS:   
Leg pain  
  
PROTOCOLS: Frontal,   
lateral, and   
flexion-extension   
views of the lumbar   
spine.  
  
COMPARISON: None  
  
FINDINGS:  
  
There is 8   
millimeters of   
anterolisthesis of   
L4 upon L5 with   
moderate disc height   
loss. Flexion and  
extension view show   
no pathologic   
movement. There is   
mild disc height   
loss at T11-T12   
T12-L1, L2-L3,  
and L3-L4. Facet   
degenerative changes   
are present   
throughout. This is   
greatest at L4-5.  
  
The sacrum and   
sacroiliac joints   
are normal.  
  
Atherosclerotic   
changes are present   
in the abdominal   
aorta.  
  
  
ORDER #: [id number]   
XR/XR lumbar spine   
6V w bending  
IMPRESSION:  
  
There is 8   
millimeters of   
anterolisthesis of   
L4 upon L5 with   
moderate disc height   
loss.  
  
No pathologic   
movement on flexion   
or extension.  
  
Multilevel   
degenerative changes   
noted, as above.  
  
Impression dictated   
by: Lasha Guerrero M.D.2023 3:17   
PM  
  
  
Dictation Location:   
[place] [address]  
  
  
  
Transcribed By: [facility]   
23 1517  
Dictated By: Lasha Guerrero II, MD   
23 151  
  
Signed By:  
23 151       Normal                                  [facility]  
   
                                                    XR lumbar spine 6V   
w bending                               [facility]   
[facility]   
[facility]  
Other Phone:   
[phone]  
   
                                                    XR lumbar spine 6V   
w bending       [facility]                                 [facility]   
[facility]   
[facility]  
Other Phone:   
[phone]  
   
                                                    XR lumbar spine 6V   
w bending       [address] [place] [address]                                 [facility]   
[facility]  
Other Phone:   
[phone]  
   
                                                    XR lumbar spine 6V   
[place] [address]                                 [facility]   
[facility]  
Other Phone:   
[phone]  
   
                                                    XR lumbar spine 6V   
w bending       XRay Report                                     [facility]   
[facility]  
Other Phone:   
[phone]  
   
                                                    XR lumbar spine 6V   
w bending       Signed                                          [facility]  
Other Phone:   
[phone]  
   
                                                    XR lumbar spine 6V   
w bending                               Patient:   
Natanael Gandhi   
MR#: [id number]                                                   [facility]   
[facility]  
Other Phone:   
[phone]  
   
                                                    XR lumbar spine 6V   
w bending       [id number]                                          [facility]  
Other Phone:   
[phone]  
   
                                                    XR lumbar spine 6V   
w bending                               : 1980   
Acct:[id number]                                             [facility]   
[facility]  
Other Phone:   
[phone]  
   
                                                    XR lumbar spine 6V   
w bending                               Age/Sex: 43 / M ADM   
Date: 23                                              [facility]  
Other Phone:   
[phone]  
   
                                                    XR lumbar spine 6V   
w bending                               Loc: XD Room: Type:   
REG CLI                                                     [facility]  
Other Phone:   
[phone]  
   
                                                    XR lumbar spine 6V   
w bending                               Attending Dr:   
Kary AMBROSE                                                        [facility]   
[facility]  
Other Phone:   
[phone]  
   
                                                    XR lumbar spine 6V   
w bending                               Copies to: ARNOL Dougherty                                              [facility]   
[facility]  
Other Phone:   
[phone]  
   
                                                    XR lumbar spine 6V   
w bending                               Ordering Provider:   
ARNOL Dougherty                                                        [facility]  
Other Phone:   
[phone]  
   
                                                    XR lumbar spine 6V   
w bending                               Date of Service:   
23                                                    [facility]  
Other Phone:   
[phone]  
   
                                                    XR lumbar spine 6V   
w bending                               Accession #:   
([id number]) XR/XR   
lumbar spine 6V w   
bending: M54.16                                             [facility]  
Other Phone:   
[phone]  
   
                                                    XR lumbar spine 6V   
w bending                               XR lumbar spine 6V w   
bending 2023   
11:49 AM                                                    [facility]  
Other Phone:   
[phone]  
   
                                                    XR lumbar spine 6V   
w bending                               SIGNS AND SYMPTOMS:   
Leg pain                                                    [facility]  
Other Phone:   
[phone]  
   
                                                    XR lumbar spine 6V   
w bending                               PROTOCOLS: Frontal,   
lateral, and   
flexion-extension   
views of the lumbar   
spine.                                                      [facility]  
Other Phone:   
[phone]  
   
                                                    XR lumbar spine 6V   
w bending       COMPARISON: None                                 [facility]  
Other Phone:   
[phone]  
   
                                                    XR lumbar spine 6V   
w bending       FINDINGS:                                       [facility]  
Other Phone:   
[phone]  
   
                                                    XR lumbar spine 6V   
w bending                               There is 8   
millimeters of   
anterolisthesis of   
L4 upon L5 with   
moderate disc height   
loss. Flexion and                                           [facility]  
Other Phone:   
[phone]  
   
                                                    XR lumbar spine 6V   
w bending                               extension view show   
no pathologic   
movement. There is   
mild disc height   
loss at T11-T12   
T12-L1, L2-L3,                                              [facility]  
Other Phone:   
[phone]  
   
                                                    XR lumbar spine 6V   
w bending                               and L3-L4. Facet   
degenerative changes   
are present   
throughout. This is   
greatest at L4-5.                                           [facility]  
Other Phone:   
[phone]  
   
                                                    XR lumbar spine 6V   
w bending                               The sacrum and   
sacroiliac joints   
are normal.                                                 [facility]  
Other Phone:   
[phone]  
   
                                                    XR lumbar spine 6V   
w bending                               Atherosclerotic   
changes are present   
in the abdominal   
aorta.                                                      [facility]  
Other Phone:   
[phone]  
   
                                                    XR lumbar spine 6V   
w bending                               ORDER #: [id number]   
XR/XR lumbar spine   
6V w bending                                                [facility]  
Other Phone:   
[phone]  
   
                                                    XR lumbar spine 6V   
w bending       IMPRESSION:                                     [facility]  
Other Phone:   
[phone]  
   
                                                    XR lumbar spine 6V   
w bending                               There is 8   
millimeters of   
anterolisthesis of   
L4 upon L5 with   
moderate disc height   
loss.                                                       [facility]  
Other Phone:   
[phone]  
   
                                                    XR lumbar spine 6V   
w bending                               No pathologic   
movement on flexion   
or extension.                                               [facility]  
Other Phone:   
[phone]  
   
                                                    XR lumbar spine 6V   
w bending                               Multilevel   
degenerative changes   
noted, as above.                                            [facility]  
Other Phone:   
[phone]  
   
                                                    XR lumbar spine 6V   
w bending                               Impression dictated   
by: Lasha Guerrero M.D.2023 3:17   
PM                                                          [facility]  
Other Phone:   
[phone]  
   
                                                    XR lumbar spine 6V   
w bending                               Dictation Location:   
[place] [address]                                                 [facility]  
Other Phone:   
[phone]  
   
                                                    XR lumbar spine 6V   
w bending                               Transcribed By: PWS   
23 [facility]                                               [facility]  
Other Phone:   
[phone]  
   
                                                    XR lumbar spine 6V   
w bending                               Dictated By: Lasha Guerrero II, MD   
23 [facility]                                               [facility]  
Other Phone:   
[phone]  
   
                                                    XR lumbar spine 6V   
w bending       Signed By:                                      [facility]  
Other Phone:   
[phone]  
   
                                                    XR lumbar spine 6V   
w bending       23 [facility]                                   [facility]  
Other Phone:   
[phone]  
   
                                                    MR LUMBAR SPINE WO CONTRASTo  
n 2023   
   
                                                    MR LUMBAR SPINE WO   
CONTRAST                                EXAM: MR LUMBAR   
SPINE WO CONTRAST  
DATE:2023 2:30   
PM  
CLINICAL HISTORY:   
low back pain, leg   
weakness.  
COMPARISON: None   
available.  
TECHNIQUE:   
Multiplanar MR   
imaging of the   
lumbar spine was   
performed without   
contrast.  
FINDINGS:  
The spine is   
visualized from   
T11-12 through the   
S2, on the   
diagnostic sagittal   
sequences.  
Alignment: Lumbar   
lordosis is   
maintained.   
Approximately 5 mm   
of anterolisthesis   
of L4 over L5.   
Vertebral body   
heights and   
remaining alignment   
are within normal   
limits.  
Bone marrow   
signal/fracture:   
Mild bone marrow   
edema lower facet   
joints. Otherwise,   
unremarkable.  
Conus: The conus is   
within normal limits   
of signal intensity   
and morphology.  
Paraspinal soft   
tissues: Paraspinal   
soft tissues are   
within normal   
limits.  
Lower thoracic   
spine: Visualized   
lower thoracic canal   
and foramina are   
without significant   
narrowing.  
L1-2: Unremarkable.  
L2-3: Mild disc   
space narrowing,   
mild diffuse disc   
bulging, and mild   
hypertrophic facet   
and ligamentum   
flavum changes, with   
borderline central   
spinal stenosis and   
neural foraminal   
narrowing.  
L3-4: Mild to   
moderate disc space   
narrowing, mild   
diffuse disc   
bulging, and mild to   
moderate   
hypertrophic facet   
and ligamentum   
flavum changes, with   
mild central spinal   
stenosis and neural   
foraminal narrowing.  
L4-5: Grade 1   
anterolisthesis,   
mild disc space   
narrowing, moderate   
diffuse disc   
bulging, and marked   
hypertrophic facet   
changes, which   
results in moderate   
to marked central   
spinal stenosis,   
lateral recess and   
neural foraminal   
narrowing, greater   
on the right.  
L5-1: Mild diffuse   
disc bulging and   
mild to moderate   
hypertrophic facet   
and ligamentum   
flavum changes,   
without significant   
central spinal   
stenosis or neural   
foraminal narrowing.  
Sacrum and iliac   
wings: Unremarkable.  
IMPRESSION:  
LUMBAR SPONDYLOSIS   
AND DEGENERATIVE   
DISC DISEASE,   
PREDOMINANTLY L4-5.  
ELECTRONICALLY   
SIGNED BY: Edilberto Martinez MD          Normal                                  Not Available  
   
                                                    Quick Strepon 2023   
   
                                                    S. pyogenes Org   
specific cx Ql   
(Throat)        Negative                                        [facility]  
Other Phone:   
[phone]  
   
                      Quick Strep                                  [facility]  
Other Phone:   
[phone]  
   
                                                    SARS-CoV-2 (COVID-19) RNA NA  
A+probe Ql (Resp)on 2023   
   
                                                    SARS-CoV-2   
(COVID-19) RNA   
ALEX+probe Ql (Unsp   
spec)           Negative                                        [facility]  
Other Phone:   
[phone]  
   
                                                    XR FINGER MIN 2 VIEWSon    
   
                                                    XR FINGER MIN 2   
VIEWS                                   XR FINGER MIN 2   
VIEWS, 3/19/2023   
2:55 PM EDT,   
INDICATION:  
Swelling of finger  
COMPARISON:  
None available at   
the time of   
dictation.  
TECHNIQUE:  
Three views with   
attention to the   
left finger are   
reviewed.  
FINDINGS:  
Alignment is   
maintained.  
No fracture is   
identified.  
No other acute   
process is seen.  
IMPRESSION:  
No definite fracture   
is seen. No gas or   
radiopaque foreign   
body seen within the  
soft tissue.  
Electronically   
authenticated by:   
SADAF LIM Date:   
2023 15:44    Normal                                  [facility]  
   
                                                    Office Visit (Cardiology)on   
2023   
   
                                        Follow-up visit     Diagnoses/Problems  
Assessed  
Palpitations (785.1)   
(R00.2)  
Paroxysmal atrial   
fibrillation   
(427.31) (I48.0)  
Shortness of breath   
(786.05) (R06.02)  
Sinus bradycardia   
(427.89) (R00.1)  
Sleep apnea (780.57)   
(G47.30)  
Class 1 obesity with   
body mass index   
(BMI) of 31.0 to   
31.9 in adult   
(278.00,V85.31)  
(E66.9,Z68.31)  
Current smoker on   
some days (305.1)   
(F17.200)  
1 PPD  
Hypertriglyceridemia   
(272.1) (E78.1)  
Seizure disorder   
(345.90) (G40.909)  
Orders  
Class 1 obesity with   
body mass index   
(BMI) of 31.0 to   
31.9 in adult  
Healthy Weight Tips;   
Status:Complete -   
Retrospective   
Authorization; Done:   
[id number]  
Some eating tips   
that can help you   
lose weight.;   
Status:Complete -   
Retrospective  
Authorization; Done:   
[id number]  
Hypertriglyceridemia  
Renew: Simvastatin   
20 MG Oral Tablet;   
TAKE ONE TABLET BY   
MOUTH ONCE DAILY IN  
THE EVENING  
Paroxysmal atrial   
fibrillation  
Renew: Aspirin EC 81   
MG Oral Tablet   
Delayed Release;   
TAKE 1 TABLET DAILY  
SocHx: Current   
smoker on some days  
You need to stop   
smoking. Though it   
is not easy, more   
than half of all   
adult smokers  
have quit. We   
encourage you to   
write down all the   
reasons you should   
quit smoking and  
set a quit date for   
yourself. Ask us how   
we can help. You may   
also call  
[facility] for   
free resources and   
assistance.;   
Status:Complete -   
Retrospective  
Authorization; Done:   
[id number]  
Tobacco Use   
Screening;   
Status:Complete;   
Done: [id number]  
Patient Instructions  
Please bring all   
medicines, vitamins,   
and herbal   
supplements with you   
when you come to the   
office.  
Prescriptions will   
not be filled unless   
you are compliant   
with your follow up   
appointments or have   
a follow up   
appointment   
scheduled as per   
instruction of your   
physician. Refills   
should be requested   
at the time of your   
visit.  
Follow up in 1 year.  
Retrieve lab work  
  
  
Chief Complaint  
NATANAEL GANDHI is   
being seen for an   
annual follow-up of.  
  
History of Present   
Illness  
Patient is here for   
follow-up continue   
management for   
previous evaluation   
for palpitation and   
1 episode of atrial   
fibrillation,   
hyperlipidemia and   
previous complaint   
of shortness of   
breath. Since last   
time I saw him he   
reported only 1   
brief episode of   
palpitation lasting   
less than a few   
minutes. He denies   
lightheadedness,   
dizziness or   
syncope. He   
underwent sleep   
evaluation and the   
result noted. He   
reports he is trying   
to cut down on his   
smoking and will be   
involved in smoking   
cessation program.   
Laboratory data done   
at his family   
physician. He has   
lost close to 15   
pounds.  
Assessment  
1. Palpitation . His   
event monitor   
despite complaint of   
palpitation failed   
to demonstrate any   
significant   
arrhythmia. Patient   
mother indicated   
that he does have an   
element of anxiety.   
And documentation of   
recurrence atrial   
fibrillation  
2. Prior history of   
one episode of   
atrial fibrillation   
with low chads   
vascular score. He   
is on aspirin   
therapy  
3. Obesity recent   
weight loss close to   
15 pounds  
4. Hyperlipidemia on   
treatment  
5. Shortness of   
breath related to   
prior tobacco use.   
He reported   
improvement since he   
lost the extra   
weight  
6. Tobacco use  
7. Sleep evaluation   
noted patient   
continue to follow   
with pulmonary/sleep   
clinic  
Plan  
1. Patient was   
counseled regarding   
risk factor   
modification  
2. Patient was   
advised to remain on   
an aspirin 81 mg   
once daily  
3. Patient was   
counseled regarding   
losing weight,   
exercise and dietary   
modification  
4. We reviewed the   
results of his sleep   
evaluation  
5. I do not find me   
if he develop any   
recurrence of his   
symptoms of   
palpitation or chest   
pain  
6. I suggested 6   
month follow-up and   
I advised the   
patient to try to   
lose 10 pounds by   
next time  
7. We will see him   
in 1 year in   
follow-up or earlier   
if the need arise  
  
Current Meds  
  
Medication   
NameInstruction  
Aspirin EC 81 MG   
Oral Tablet Delayed   
ReleaseTAKE 1 TABLET   
DAILY.  
Ibuprofen 100 MG/5ML   
Oral SuspensionTAKE   
10 ML 4 times daily   
PRN  
levETIRAcetam 100   
MG/ML Oral   
SolutionTAKE 7.5 ML   
TWICE DAILY  
Simvastatin 20 MG   
Oral TabletTAKE ONE   
TABLET BY MOUTH ONCE   
DAILY IN THE EVENING  
Allergies  
Medication  
Benadryl TABS  
Allergy;   
Hypertension;;   
Recorded By:   
Sandra Felix;   
2021 8:19:32   
PM  
Topiramate TABS  
Adverse Reaction;   
Depression;;   
Recorded By: Nacho Cr; 2023   
1:00:26 PM  
predniSONE  
Adverse Reaction;   
Recorded By: Nacho Cr; 2023   
1:00:26 PM  
Social History  
Problems  
Caffeine use   
(V49.89) (Z78.9)  
1 cup-2 cups coffee   
in AM, 2-3 POPS A   
DAY  
Current smoker on   
some days (305.1)   
(F17.200)  
1 PPD  
No alcohol use  
No illicit drug use  
Review of Systems  
Constitutional: not   
feeling tired.  
Cardiovascular: no   
intermittent leg   
claudication and as   
noted in HPI.  
Respiratory: no   
cough and no   
shortness of breath.  
Gastrointestinal: no   
change in bowel   
habits and no blood   
in stools.  
Integumentary: no   
skin rashes.  
Neurological: no   
seizures and no   
frequent falls.  
All other systems   
have been reviewed   
and are negative for   
complaint.  
  
Vitals  
Vital Signs  
Recorded: [id number]   
01:05PM (more   
content not   
included)...        Normal                                  [facility]  
   
                                                    [facility]    
   
                                                    SARS-CoV-2   
(COVID-19) RNA   
ALEX+probe Ql (Unsp   
spec)           Not detected    Normal          NOT DETECTED    [facility]  
   
                                        Comment on above:   Result Comment: This  
 test is not yet approved or cleared by   
the   
[place] FDA. When there are no FDA-approved or cleared tests   
available, and other criteria are met, FDA can make tests available   
under an emergency access mechanism called an Emergency Use   
Authorization (EUA). The EUA for this test is supported by the   
 of Health and Human Service's (HHS's) declaration that   
circumstances exist to justify the emergency use of in vitro   
diagnostics for the detection and/or diagnosis of the virus that   
causes COVID-19. This EUA will remain in effect (meaning this test   
can be used) for the duration of the COVID-19 declaration   
justifying emergency of IVDs, unless it is terminated or revoked by   
FDA (after which the test may no longer be used).  
When diagnostic testing is negative, the possibility of a false   
negative should be considered in  
the context of a patient's recent exposures and the presence of   
clinical signs and symptoms  
consistent with SARS-CoV-2.   
   
                                                            Performed By: #### C  
VDTBH ####  
[facility]  
[address] [place] [address]  
Dr. Joon Núñez   
   
                                                    INFLUENZA A AND B AGon    
   
                      INFLUBNEGH SEE BELOW  Normal                [facility]  
   
                                        Comment on above:   Result Comment: Nega  
tive for Flu B protein antigen. Infection   
due   
to Flu B cannot be ruled out. Flu B antigen in the sample may be   
below the detection limit of the test.   
   
                                                            Performed By: #### I  
NFLUAB ####  
[facility]  
[address] [place] [address]  
Dr. Joon Núñez   
   
                          INFLUENZA A AG Positive     Abnormal     NEGATIVE SEE   
COMMENT                                 [facility]  
   
                                        Comment on above:   Performed By: #### I  
NFLUAB ####  
[facility]  
[address] [place] [address]  
Dr. Joon Núñez   
   
                          INFLUENZA B AG Negative     Normal       NEGATIVE SEE   
COMMENT                                 [facility]  
   
                                        Comment on above:   Performed By: #### I  
NFLUAB ####  
[facility]  
[address] [address]  
Dr. Joon Núñez   
   
                      INFLUPOSH  SEE BELOW  Normal                [facility]  
   
                                        Comment on above:   Result Comment: NOTE  
: Live attenuated influenzae vaccine   
viruses   
can cause a positive result for a rapid influenza diagnostic test   
if administered up to 7 days prior to rapid testing.   
   
                                                            Performed By: #### I  
NFLUAB ####  
[facility]  
[address] [address]  
Dr. Joon Núñez   
   
                          INTERNAL CONTROLS Within Normal Limits Normal       Wi  
thin Normal   
Limits                                  [facility]  
   
                                        Comment on above:   Performed By: #### I  
NFLUAB ####  
[facility]  
[address] [address]  
Dr. Joon Núñez   
   
                                                    XR Wrist Complete Right*on 2022   
   
                                                    XR Wrist Complete   
Right*                                  HISTORY: Wrist pain  
  
COMPARISON: None   
available  
  
TECHNIQUE: AP,   
lateral, oblique and   
scaphoid views of   
the wrist obtained.  
  
FINDINGS:  
  
No acute fracture or   
dislocation. Carpal   
and radiocarpal   
alignment is   
satisfactory.  
  
Negative ulnar   
variance. There is a   
7 mm area of   
sclerosis within the   
lunate that  
is nonspecific. Soft   
tissues are within   
normal limits.  
  
IMPRESSION:  
  
No acute fracture.  
  
Nonspecific 7 mm   
area of sclerosis   
within the lunate   
may represent a bone   
island  
or osteonecrosis.   
Follow-up MRI of the   
wrist without   
contrast is   
recommended to  
further evaluate.  
  
  
Report reported and   
signed by BREONNA HOLT on 2022   
1120                Normal                                  [facility]   
Medical   
Specialist  
   
                                                    XR SHOULDER RT 2V or >on    
   
                                                    XR SHOULDER RT 2V   
or >                                    EXAM: XR SHOULDER RT   
2V or >  
HISTORY: Acute pain   
due to injury  
COMPARISON: None.  
TECHNIQUE: 3 views   
were obtained of the   
right shoulder.  
FINDINGS:  
A fracture is not   
identified.  
A cortical   
irregularity is not   
detected.  
The glenohumeral   
joint appears   
normal.  
The AC joint appears   
normal.  
A foreign body is   
not identified.  
The visualized   
portion of the lung   
is clear.  
A rib fracture is   
not identified.  
A foreign body is   
not preserved. There   
is no significant   
soft tissue   
swelling.  
IMPRESSION:  
1. Normal right   
shoulder.  
Electronically   
authenticated by:   
TORI STRINGER Date:   
2022 17:23    Normal                                  [facility]  
   
                                                    XR FOOT DURAN MIN 3 VIEWSon    
   
                                                    XR FOOT DURAN MIN 3   
VIEWS                                   EXAMINATION: XR   
ANKLE DURAN MIN 3   
VIEWS, XR FOOT DURAN   
MIN 3 VIEWS  
HISTORY: Bilateral   
ankle joint pain  
COMPARISON: No   
relevant comparison   
available.  
FINDINGS:  
RIGHT FINDINGS:  
BONES: No   
significant   
arthropathy or acute   
abnormality.   
Incidental ununited  
ossification along   
the proximal dorsal   
margin of the talus.  
SOFT TISSUES: No   
visible soft tissue   
swelling.  
OTHER: Negative.  
LEFT FINDINGS:  
BONES: No   
significant   
arthropathy or acute   
abnormality.   
Incidental ununited  
ossification along   
the proximal dorsal   
margin of the talus.  
SOFT TISSUES: No   
visible soft tissue   
swelling.  
OTHER: Negative.  
IMPRESSION:  
RIGHT CONCLUSION: No   
acute bone   
abnormality,   
significant   
degenerative joint  
disease, or   
suspicious findings.  
LEFT CONCLUSION: No   
acute bone   
abnormality,   
significant   
degenerative joint  
disease, or   
suspicious findings.  
  
Electronically   
authenticated by:   
EDILBERTO STROUD Date:   
2022 16:24    Normal                                  [facility]  
   
                                                    Auth for Release of Medical   
Recordson 2022   
   
                                                    Auth for Release   
of Medical Records                      [id number]  
[id number]  
#1.00CD:127         Normal                                  [facility]  
   
                                                    Tobacco Screening.on   
022   
   
                                                    Tobacco use status   
CP            a) Yes                                          [facility]   
[facility]   
[address]  
Work Phone:   
[phone]  
   
                      Tobacco Screening. Yes                              [facility]   
[facility]   
[address] DO  
Work Phone:   
[phone]  
   
                                                    Tobacco use status   
CP            b) No                                           [facility]   
[facility]   
[address] DO  
Work Phone:   
[phone]  
   
                                                    Pre-Certification Formon    
   
                                                    Pre-Certification   
Form                                    [id number]  
[id number]  
#1.00CD:127         Normal                                  [facility]  
   
                                                    Pathology Noteon 2021   
   
                                        Pathology Note      [id number]  
[id number]  
1#1.00CD:127        Normal                                  [facility]  
   
                                        Comment on above:   Result Comment: Elec  
tronically Signed By: Can Phelps MD, Branden Garcia\Date and Time Signed: 06/15/21 09:12 EDT   
   
                                                    Reference Lab Reporton    
   
                                                    Reference Lab   
Report                                  [id number]  
[id number]  
8#1.00CD:127        Normal                                  [facility]  
   
                                                    Reminderson 2021   
   
                                        Reminders           --------------------  
-  
From: Sangita Blackwell  
To: [facility];  
Sent: 2021   
10:44:55 EDT Show   
up: 2021   
10:44:00 EDT  
Subject: CT/ Semen   
analysis  
Reminder/Recall  
CT a/p being done at   
[facility], faxed order for   
precert and to be   
scheduled  
Semen analysis being   
done  or [facility]  
Fish/cytology done   
21  
--------------------  
-  
From: Clara Corley MA ( -   
Clinical)  
To: Sangita Blackwell;  
Sent: 2021   
16:02:02 EDT Show   
up: 2021   
16:01:00 EDT  
Subject: RE: CT/   
Semen analysis  
Natanael was   
scheduled for a CT   
at [facility] on 21   
and he No showed his   
appt. What do we do   
now?                Normal                                  [facility]  
   
                                        Reminders           --------------------  
-  
From: Clara Corley MA  
To: [facility];  
Sent: 2021   
14:59:50 EDT Show   
up: 2021   
14:59:00 EDT  
Subject:   
FIsh/Cytology  
  
Reminder/Recall  
  
  
Fish/Cytology done   
through [facility]  
Fish/Cytology is   
negative.           Normal                                  [facility]  
   
                                                    Lab Miscellaneous-LCon    
   
                                Lab Miscellaneous COMMENT         Invalid   
Interpretation   
Code                                                [facility]  
   
                                        Comment on above:   Result Comment: Test  
 Ordered: [id number] Bladder Cancer MD ALISON   
Review  
Bladder Cancer FISH Findings: Comment 4_  
Negative UroVysion Result  
Fluorescence in situ hybridization (FISH) of cells  
recovered from urine was performed using the [employer] UroVysion  
Kit.  
A minimum of twenty-five cells was examined, and an  
abnormal signal pattern was not detected, indicating a  
NEGATIVE result.  
The performance characteristics of this test have been  
validated by [employer]. A positive result is the  
detection of four or more cells with greater than two  
signals for at least two chromosomes (3, and/or 7, and/or  
17) and/or twelve or more cells with no signal for  
chromosome 9. Probes: 3cen(D3Z1), 7cen(D7Z1), 9p21(p16),  
17cen(D17Z1)  
Specimen Type: Comment 4_  
Unspecified Collection Method  
Specimen Description: Comment 4_  
Received is 60ml of yellow, clear, preserved urine.  
Electronically signed: Comment 4_  
Alyson Rodas M.D.  
Clinical Data: Comment 4_  
No clinical data specified  
CPT Codes: Comment 4_  
[address]  
Performed at:  [facility] [address] [id number]  
[id number] PhD Jimena Cantu   
   
                                                            Performed By: #### 1  
[id number] ####  
[facility]  
[address] [place] [address]   
   
                                                    Coding Summary.on 2021  
   
   
                                        Coding Summary.     [id number]  
Gh0bWw+PGhlYWQ+PE1FV  
GUzW20qoTLlgO4BI7qPQ  
Y3RWATUXKNGTH3JIY3bm  
DD4WHqaJ0PwajKx  
PtccpYUaWE21YVn4ZTJ8  
sIpiBFuozC6iaVShW5d5  
DlHzGH25rY94XAatULEs  
LxI3ClWxnqwkxUFx  
T1gsSwEgsFDbLjs+PHRh  
YmxlIHdpZHRoPScxMDAl  
YjLbtNouZD0hDm3vTYMb  
LWNvbGxhcHNlOiBj  
r4lbJKZnAZeuYP7nhSyi  
V8ExdUZ0YFHtf8o7Vr01  
dHI+OQYwIGH9mMhzKIjg  
t083CmXjb3btQLN9  
oBObTEtmTLR9D49em1W3  
MDWjBEWmCDE3xZA2hE5v  
xTrtkazdT8QtxDJrYiR1  
PAK1yABpuW3lxMxj  
usibsR0uJdo+U91HLN2B  
XWLXZI6NQox9L5TuVrlq  
dHI+IP14YLHyOM06fQKf  
iCNvb9fubBx0KnXk  
WZJbHFJ3dQvuVUftl9Ab  
BXBeW63kqIHoz1S3LCDu  
oLzzgOXbSlNesMF4mE7c  
DOovyxwqu0icwlnw  
Rhlwn0gzyw38gL83L01j  
BYalGREeMND6DWVzVIIz  
iTfuro9deW0sZe8+IDxj  
t5pjc0psqSr6XqWg  
JBVjulNcuQkyFIA2o5Vi  
Pk46K5EbiWmrw2PfMzn7  
pb26pSZzr0X8nDM2WZrq  
CBReoA3sNVgfJvN0  
GRXeCmPfbN58xZMlPShh  
Lv5lwKizjCtmIH8xVVAz  
ecojLPLhnK3dIQOgxRBg  
tEojDT3lNZWhflug  
d645DtNjNDJ5NEIxtTBw  
F7FdxI8sTpUnTPWlGUJb  
V3JxfGYoSIokK215XObx  
OzU5QDKfcuQjR4So  
GODyxBfgUkB3t7O5Jr6B  
z1HioopeYFV8LDjwMWH9  
BgJxFaKmThQ1K6AvMnr8  
QWFrfWvpOA5fA0Al  
RGOhiqenobxodHF8CSEt  
WPNdkK59kIFuXKebKn5r  
b5S7t101CGAsTLSdgI11  
Xf4aaQmhXXIeyEFD  
qW8gqnnxd5gfprtiBkXi  
BKYuQXy9HGc8KFOagAgf  
DeLeSJX6IkS1FMD3qMRp  
fP2mvGpmmwbinZ0c  
Oyc+E03vvD0tZJK9VOY8  
hbheCOWxmaDeRT43EA20  
U5ZjGpdsyANbuRN+PGRp  
mbKsrRotEK1mJtUa  
q7tex6YiTTpyQ0NkCMPg  
HVotSph2LNZzFGI9rCZ0  
uK6jMHHtGAkus4L1yKO2  
Z4KouzMbcm2tw6vb  
KJJsSVweV60ziSQts7I9  
BAPnvXT3CEShsVtmSbRw  
sE06Xjn+ZAFqkAupk1Ez  
Lwlww8buo3qttBq0  
IjMwJSIgdmFsaWduPSJ0  
n4GtVn43E18fCOumVHBb  
HUGhEYIkCBLotTeswx2z  
xB9cFa4+PGNvbCB3  
dER1pP9fVOKrWoK3MNlq  
K348OiRlyQEtYvrky8ml  
f3lgbQt3NtZjIITqbaPr  
qKvyVXO5a9EiAc43  
P19pQAujGZCkYOEyFRYa  
HSTftQpbyp8jyZ6jDj7+  
RK9wc8ybkr58jE98mKA+  
EVJfOYK8zGbbWHbj  
LAAgdK1pABjuYsY6CXVq  
WlXajE98kGMnOEhlIm4b  
nNrjoOvpMR2mHLPbqcbn  
p449QxBme7srTGPz  
jWHiZMcqNJZ4I06ld2K4  
AWCjXOPgYDQ3wAO6cB3y  
bGlnbjogbGVmdDsgdmVy  
eHtwYFxyUMzfF876  
IHRvcDsnPlBhdGllbnQg  
UgDtWAc6T6WcKph4BJCu  
zIvdJK1lqENwRKpfBl1p  
gLvfiPnuRW4eSXVg  
ickgl049KjXtc5lvGXEk  
dDYqIYdnDOM2F04st1E9  
YRNhGWCfPWT3hGK5iX8s  
bGlnbjogbGVmdDsg  
oyXtjDhmQEefLTexD843  
IHRvcDsnPkJpcnRoIERh  
bRC6KC05RX07cXLic5I2  
uUU2Y3PfLXYujxbj  
swtdoWT9HRLeKWMtvW60  
Yo1hxFpxLf6dPYQrUAA3  
DOMtlBAoO6QytA9rClIx  
XLGnUFZeV6SbzIWi  
LBfaW991TMyqOcJ9DSGj  
vrLcN0FpSTEekEeoDjH8  
z8T9Zs4UA3W2BN04DU36  
gXXtb4F8qSY7U2Qz  
CPJzvmutwmtbdUL5NIEd  
VEIozN45Xw2dnRbwJg9t  
BPHvZHD7QPZzwRBkD7Jg  
eA2pLyWfFENhYLBe  
M8VjgKOeRZtbE854GIgl  
RaS6FRXhqwNfX2NcTKIc  
hVptKtU5a2O9Er9FWDk2  
GE62VU81mVZch6A2  
oXB7P5KhLSLfpkrxguai  
yYN2QAMcKHQirY44Gy4h  
rMmiUl9sTOPhHQX9WKNj  
bCYmC1VatW8bSgWl  
COEoLZZeQ7NsfARgQDwv  
N043LTcdUuF2VSLumvVz  
K8WuUKZncEijIyF2p1I9  
Kx9NOUTwBI61UCT1  
hMD3PM92TT27D5BxKxgi  
dGFibGU+PHRhYmxlIHdp  
ZHRoPScxMDAlJyBzdHls  
HC0zLa5dBQQtTOPr  
rMlxbZAkLvMdo6vxFIAc  
GGcnJX9tbKojC7UkzGR5  
QJKrd0h4Sq32A79jR5Oe  
dXA+NTYfqGG4gNM4  
bC4oJzNgKwU1UCpwV972  
MoQisFHqPspdj2pdt3nr  
rRw1IdB7KPXaryTvlMnz  
EYV0g6HkQs13L51u  
IHdpZHRoPSIxNSUiIHZh  
hXdsmx5xuD2uRk3+PGNv  
iHD1tKK1cE1uAvCtVtF9  
PTrwD740IrJfuLSq  
Mjttt9glw3pxvKb2TuFx  
IRBfipKfyNdmWQX9f6Sz  
Wh03O8CzaEkbc6QoCac3  
pw71mXBmx9V7jJF8  
H9UgHAQvtbwdqXUadJfc  
VX9gGHCfkmrgVPDyiC5k  
TOLlJ5r4NyGqEqC4OAil  
B5RniaI7ZKFnlZYg  
EFowIVU0W10uo3R0RSGd  
LHCrPVJ0lCE3gZ4ghXxa  
bjogbGVmdDsgdmVydGlj  
QAmkBGgyV459FXNn  
kQlhOJDgnI2aZGEjeFPh  
xCwvYX7wDHJwxjizQeKC  
X8HdDWXZEGSIOTPCEMJN  
DYO5F7VvPua6EJHs  
rJoeVU3kjFAaHLmbSt8i  
kWpqcYvqJQ8pKMZcxalw  
LCKoeB7rZFLrxEAhsMwn  
UG3cDUPveoytx777  
WoQyDCL1IGYogYPkD6Sn  
nL3jBqPnSIMzEHQvA4Ou  
hMWtGFgiD649XKizVoD7  
HPAffmGwT0XkQDTd  
lMmbLkA0h5Q3On1eWT1v  
Uy4cREt6BG24QV44uLBm  
w7C3uFY7R0NlLJGlebzi  
qonjdEF3VTIaTXDn  
mT99uGEnYAbdSw3bp0Y1  
b854FNHxBILbfN73Sz2b  
cCeaQLEnnOXZtM3tfhlb  
l2pdmlmuFlHhAPMs  
CMb7VKy5WKCspVxhNnAv  
GLV8HfT1JLQ1aMSfzP5c  
vDrbtdmsaR5vWif+NDIg  
DWTfiwM7B0UbBcw2  
LAVdvHfjDA2ttZHcTOar  
Cz4gjMuigQclPC5eQKQj  
akyjUZDkmN9sUDNrdLEf  
uCjjDB8dXSHkdtig  
t087GvOiOUB7SZVimQKq  
U2PxoW6hFfZdQNFsYCPr  
S4YfkNArYJsfT886URqp  
DwN9WPIckgEmO0Qn  
RHWuaBagQtT6u1O5Qr0Z  
LFofTC83RJ73pNMah6F9  
zQJ9Q4GqMGEqdckieodc  
hNW0ISDmCXIaaZ80  
zEZcBZajWt2ck8T2u160  
KZQqPDZdpN27Mr8gsEpi  
BIPxoKHCbB9ginfpf5tv  
cjogIzAwMDAwMDt0  
JQc3KAJwiRvuXhCoFBF2  
OcD8WSS6rBWrkI0qkMqr  
ucebaQ7oYsx+TGFiIERy  
z5Zga8PyRI88OC12  
F3FgFtstnXTlrJS+PHRh  
YmxlIHdpZHRoPScxMDAl  
QzUcdRieIG6cGq2oBWFk  
LWNvbGxhcHNlOiBj  
e2huKABjNAvwFL2suDbg  
T4EolMH6ODBpt3q3Hy00  
J85cZ4PjaSQ+PGNvbCB3  
gQS9zM1bSfKuRoV8  
SQygQ775DrMthHMqIsiw  
q5cou0dtqNm0MgOoUJEi  
frYxlRssUXO7z8TeQa81  
M85fVOdkQRKcPAMm  
QJVtSDMmhDrbaj6hnQ5u  
Ii8+RKUnyPS8wTP9hH6c  
UgHaNgA8DKsuB126TcUb  
lLKyRraaS11hM4Ef  
dXA+WZViKfn1NPRkdYtd  
HQ2hoLOsGNxhQh9wACW2  
GsOeNbKjBIgzW5SrKMMj  
dllhnijtgOS5KNZw  
URXikF03Kv0luAkyUf1e  
UOAyFQJ6PITpmLErU4Sr  
iW6sDaEqMMAjTOYoT0Vr  
eXMpHAlzF170BVpd  
OdT6GCCisdIvE5MvSXPq  
yRhiNzN3n0K3Kh2NzApy  
yQApCK7xKmSgNZv8D9Cb  
Dbu0KTFzjExtFA5c  
gVDkIQipMt2fhLcqwNyw  
EK0oQVDdlmlkm749FgBo  
u7ivJLJauJVlFUzcZEN8  
P92oi0U4SYHcQJFq  
BSW3nCV2oT3dgEpaools  
bGVmdDsgdmVydGljYWwt  
EQbyJ112AGHatWvvIhOY  
Mbx8B9CoXgw5QGSu  
tPxuSU1qhRFkRYtsWf6p  
vIhfxEsyNN9jALQxtafp  
q682TeFpa5feSZFogMKi  
JEizXSB6Q66rg2T3  
EFVfJEExNFV9sFA1bF2t  
bGlnbjogbGVmdDsgdmVy  
kIvdFGkfPZqrM892UDBi  
fZuhAg0YRxj6S2Jv  
Fou3DEHmbSrjHE4dyGKk  
URpmNn7qcBwmrWgdIV7a  
QPLxaljul957AtMrb7pu  
IDEwcHQgVGltZXM7  
N89wl4B0LRAuRYHbNMC0  
mFT9hE3qrOzwnmbtrERw  
dDsgdmVydGljYWwtYWxp  
C646ZIKclThtBgIr  
eWVyOjwvdGQ+YC51jl54  
K3MrWhnuZam5CRTcGSY9  
uWX1aK4oYOViVNkxv9E0  
tBH5M1LvpiNkls5d  
b2xs (more content   
not included)...    Normal                                  [facility]  
   
                                                    Ambulatory Clinical Summaryo  
n 2021   
   
                                                    Ambulatory   
Clinical Summary                        {[id number]  
[id number]  
2a-e9-63}[id number]  Normal                                  [facility]  
   
                                                    Lab Miscellaneous-LCon    
   
                                Test Code       [id number]          Invalid   
Interpretation   
Code                                                [facility]  
   
                                        Comment on above:   Performed By: #### 1  
[id number] ####  
[facility]  
[address] [place] [address]   
   
                                Test Name       FISH            Invalid   
Interpretation   
Code                                                [facility]  
   
                                        Comment on above:   Performed By: #### 1  
[id number] ####  
[facility]  
[address] [place] [address]   
   
                                                    Patient Educationon 20  
21   
   
                                        Patient Education   Urology  
Hematuria, Adult  
(Inserted Image.   
Unable to display)  
Hematuria is blood   
in the urine. Blood   
may be visible in   
the urine, or it may   
be identified with a   
test. This condition   
can be caused by   
infections of the   
bladder, urethra,   
kidney, or prostate.   
Other possible   
causes include:  
? Kidney stones.  
? Cancer of the   
urinary tract.  
? Too much calcium   
in the urine.  
? Conditions that   
are passed from   
parent to child   
(inherited   
conditions).  
? Exercise that   
requires a lot of   
energy.  
Infections can   
usually be treated   
with medicine, and a   
kidney stone usually   
will pass through   
your urine. If   
neither of these is   
the cause of your   
hematuria, more   
tests may be needed   
to identify the   
cause of your   
symptoms.  
It is very important   
to tell your health   
care provider about   
any blood in your   
urine, even if it is   
painless or the   
blood stops without   
treatment. Blood in   
the urine, when it   
happens and then   
stops and then   
happens again, can   
be a symptom of a   
very serious   
condition, including   
cancer. There is no   
pain in the initial   
stages of many   
urinary cancers.  
Follow these   
instructions at   
home:  
Medicines  
? Take   
over-the-counter and   
prescription   
medicines only as   
told by your health   
care provider.  
? If you were   
prescribed an   
antibiotic medicine,   
take it as told by   
your health care   
provider. Do not   
stop taking the   
antibiotic even if   
you start to feel   
better.  
Eating and drinking  
? Drink enough fluid   
to keep your urine   
clear or pale   
yellow. It is   
recommended that you   
drink 3?4 quarts   
(2.8?3.8 L) a day.   
If you have been   
diagnosed with an   
infection, it is   
recommended that you   
drink cranberry   
juice in addition to   
large amounts of   
water.  
? Avoid caffeine,   
tea, and carbonated   
beverages. These   
tend to irritate the   
bladder.  
? Avoid alcohol   
because it may   
irritate the   
prostate (men).  
General instructions  
? If you have been   
diagnosed with a   
kidney stone, follow   
your health care   
provider's   
instructions about   
straining your urine   
to catch the stone.  
? Empty your bladder   
often. Avoid holding   
urine for long   
periods of time.  
? If you are female:  
? After a bowel   
movement, wipe from   
front to back and   
use each piece of   
toilet paper only   
once.  
? Empty your bladder   
before and after   
sex.  
? Pay attention to   
any changes in your   
symptoms. Tell your   
health care provider   
about any changes or   
any new symptoms.  
? It is your   
responsibility to   
get your test   
results. Ask your   
health care   
provider, or the   
department   
performing the test,   
when your results   
will be ready.  
? Keep all follow-up   
visits as told by   
your health care   
provider. This is   
important.  
Contact a health   
care provider if:  
? You develop back   
pain.  
? You have a fever.  
? You have nausea or   
vomiting.  
? Your symptoms do   
not improve after 3   
days.  
? Your symptoms get   
worse.  
Get help right away   
if:  
? You develop severe   
vomiting and are   
unable take medicine   
without vomiting.  
? You develop severe   
pain in your back or   
abdomen even though   
you are taking   
medicine.  
? You pass a large   
amount of blood in   
your urine.  
? You pass blood   
clots in your urine.  
? You feel very weak   
or like you might   
faint.  
? You faint.  
Summary  
? Hematuria is blood   
in the urine. It has   
many possible   
causes.  
? It is very   
important that you   
tell your health   
care provider about   
any blood in your   
urine, even if it is   
painless or the   
blood stops without   
treatment.  
? Take   
over-the-counter and   
prescription   
medicines only as   
told by your health   
care provider.  
? Drink enough fluid   
to keep your urine   
clear or pale   
yellow.  
This information is   
not intended to   
replace advice given   
to you by your   
health care   
provider. Make sure   
you discuss any   
questions you have   
with your health   
care provider.  
Document Released:   
2006 Document   
Revised: 2020   
Document Reviewed:   
2018  
[employer] [facility]   
Education ?    
[employer]       [facility]  
   
                                                    Urology Office/Clinic Noteon  
 2021   
   
                                                    Urology   
Office/Clinic Note                      Chief Complaint  
gross hematuria  
Natanael is here   
because of a history   
of gross hematuria   
which he relates to   
episode of straining   
to hold his urine   
for a long. He is a   
cigarette smoker and   
has been for least   
30 years with at   
least 1 pack a day.   
He had an episode   
with 3 days of   
painless gross   
hematuria that has   
since resolved.   
Presently has   
microscopic   
hematuria and is   
here today for   
urologic evaluation.   
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   
an undescended right   
that was managed   
surgically. He   
states he had no   
children and wonders   
if this might be the   
cause of his   
perceived   
infertility. He is   
here today to   
establish urologic   
care.  
HPI Staff  
Natanael is a 41 y.o.   
male here for gross   
hematuria. Patient   
states that he   
noticed it after   
holding urine. He   
noticed blood   
intermittently x3   
days. Patient   
noticed that he did   
have some pain in   
groin at that time  
Dysuria: _Denies   
burning or   
discomfort with   
urination  
Incomplete bladder   
emptying: _Denies  
Hematuria: _Has not   
noticed any blood in   
urine over a month  
Frequency: _Denies  
Urgency: _Has some   
urgency  
Nocturia: _maybe   
once  
Stream: _Strong.   
Denies hesitancy..  
Leaking: _Denies  
Post void dripping:   
_Denies  
Wearing pads/   
Depends: _Denies  
Urge incontinence:   
_Denies  
Stress incontinence:   
_Denies  
Incontinence without   
Sensory Awareness:   
_Denies  
Abdominal pain:   
_Denies  
Flank pain: _Denies  
Sexual complaints: _  
History of Present   
Illness  
Reviewed [facility],   
referral, and new   
pt. forms.  
There have been no   
associated fever,   
chills, or flank   
pain. Pt. denies any   
pain/burning with   
urination at this   
time.  
Review of Systems  
PHQ Score  
Initial Depression   
Screen Score: 0  
ROS - Provider  
Constitutional:   
denies weight loss,   
denies hot flashes.  
Eyes: denies eye   
problems.  
Gastrointestinal:   
denies nausea,   
denies vomiting.  
Cardiovascular:   
denies chest pain or   
angina.  
Integumentary: no   
dryness  
Musculoskeletal:   
denies   
musculoskeletal   
symptoms.  
ENMT: denies   
otolaryngeal   
symptoms.  
Respiratory: no   
shortness of breath.  
Heme/Lymph: denies   
easy bleeding   
tendency, denies   
easy bruising   
tendency.  
Psychiatric: no   
confusion, no   
anxiety.  
Genitourinary:   
denies dysuria, mild   
hematuria, denies   
discharge, denies   
urinary frequency,   
denies urinary   
hesitancy, denies   
nocturia, denies   
incontinence, denies   
genital sores,   
denies decreased   
libido, and denies   
erectile   
dysfunction.  
Physical Exam  
Vitals &   
Measurements  
HR: 76(Peripheral)   
BP: 138/72  
HT: 168 cm HT: 168.0   
cm WT: 102.1 kg WT:   
102.1 kg BMI: 36.17  
General Appearance:   
alert, no distress,   
well nourished, well   
developed male.  
Head: normocephalic   
.  
Eyes: normal orbit   
and globe.  
ENMT: normal   
examination of   
external ears.  
Chest: Lungs CTA,   
respirations non   
labored.  
Cardiovascular:   
regular rate and   
rhythm.  
Abdomen: soft, non   
distended, no   
tenderness, no mass   
or organomegaly, no   
hernia.  
Genitourinary:   
normal scrotum,   
abnormal testes the   
right testicle is of   
identifiable. He has   
a history of a right   
orchidopexy but I   
could not identify   
testicle on the   
right side. There is   
a scar from his   
previous surgery.   
The left testicle is   
present with small.,   
normal urethra,   
normal epididymis,   
normal vas   
deferens/spermatic   
cord. Today's exam -   
undescended testicle   
on the right.  
Flank Pain: none.  
Bladder:   
nonpalpable.  
Penis: normal shaft,   
normal glans.  
Lymph Nodes:   
unremarkable   
palpation of the   
cervical area.  
Skin: warm, dry, no   
bruising.  
Psychiatric:   
cooperative, affect   
appropriate for age,   
normal judgement,   
euthymic mood.  
Assessment/Plan  
This is a patient   
with a history of   
gross painless   
hematuria. He is a   
longtime cigarette   
smoker and does take   
an aspirin once a   
day for the atrial   
fibrillation. He has   
complaints of   
infertility. He is   
being scheduled for   
cystoscopic   
examination, CT scan   
with contrast and   
urine cytology to   
complete the   
hematuria work-up.   
Physical exam shows   
an undescended right   
testicle. He states   
he had a surgical   
procedure many years   
ago but I cannot   
identify the   
testicle on physical   
exam. The left   
testicle is smaller   
than normal and this   
may be contributing   
to infertility.   
Semen analysis has   
been ordered at the   
patient's request.   
Preop antibiotics   
have been ordered. I   
answered all of his   
questions. Informed   
consent has been   
obtained.  
1. Gross hematuria   
(R31.0: Gross   
hematuria)  
Pt. states noticing   
blood a month ago   
that lasted for   
three days. UA today   
- small. Will send   
UA today for   
FISH/Cyto. Pt. is to   
obtain a CT and will   
schedule pt. for   
Cystoscopy. The   
risks and benefits   
for cystoscopy have   
been discussed. The   
risks include   
bleeding, infection,   
and irritation of   
the bladder and   
urinary channel,   
among others. The   
patient, after being   
informed of   
procedural details   
and after questions   
have been answered,   
wishes to proceed.   
Full informed   
consent has been   
obtained. Will order   
Local anesthesia.   
ABX sent to [email or web address] in [place].  
Ordered:  
Body Mass Index   
(BMI) documented   
3008F  
CT Abdomen/Pelvis w/   
Contrast  
Semen Analysis   
Motility/Count  
Urnls Dip Stick Auto   
w/o M (more content   
not included)...    Normal                                  [facility]  
   
                                        Comment on above:   Result Comment: Elec  
tronically Signed By: Can Phelps MD, Branden LENZ\.br\Date and Time Signed: 21 10:21 EDT\.br\Electronically   
Co-Signed By: Danya Rosales MA\.br\Date and Time Co-Signed:   
21 10:15 EDT   
  
  
  
Vital Signs  
  
  
                          Date Time    Vital Sign   Value        Performing   
Clinician                               Facility  
   
                                                    01-   
14:          Body height         170.18 cm           Isreal Bautista  
Other Phone:   
[phone]                           [facility]  
Other Phone:   
[phone]  
   
                                                    01-   
14:                              Diastolic blood   
pressure                  90 mm[Hg]                 Isreal Bautista  
Other Phone:   
[phone]                           [facility]  
Other Phone:   
[phone]  
   
                                                    01-   
14:                              SaO2% (BldA) [Mass   
fraction]                 99 %                      Isreal Bautista  
Other Phone:   
[phone]                           [facility]  
Other Phone:   
[phone]  
   
                                                    01-   
14:                              Systolic blood   
pressure                  130 mm[Hg]                Isreal Bautista  
Other Phone:   
[phone]                           [facility]  
Other Phone:   
[phone]  
   
                                                    2023   
09:          Body height         170.18 cm           [facility]  
Other Phone:   
[phone]                           [facility]  
Other Phone:   
[phone]  
   
                                                    2023   
09:                              Body mass index   
(BMI) [Ratio]             32.57 kg/m2               [facility]  
Other Phone:   
[phone]                           [facility]  
Other Phone:   
[phone]  
   
                                                    2023   
09:          Body weight         94.35 kg            [facility]  
Other Phone:   
[phone]                           [facility]  
Other Phone:   
[phone]  
   
                                                    2023   
14:          Body height         170.18 cm           Sangita Moscoso  
Other Phone:   
[phone]                           [facility]  
Other Phone:   
[phone]  
   
                                                    2023   
14:                              Body mass index   
(BMI) [Ratio]             32.57 kg/m2               Sangita Moscoso  
Other Phone:   
[phone]                           [facility]  
Other Phone:   
[phone]  
   
                                                    2023   
14:          Body temperature    99.5 [degF]         Sangita Moscoso  
Other Phone:   
[phone]                           [facility]  
Other Phone:   
[phone]  
   
                                                    2023   
14:          Body weight         94.35 kg            Sangita Moscoso  
Other Phone:   
[phone]                           [facility]  
Other Phone:   
[phone]  
   
                                                    2023   
14:                              Diastolic blood   
pressure                  76 mm[Hg]                 Sangita Moscoso  
Other Phone:   
[phone]                           [facility]  
Other Phone:   
[phone]  
   
                                                    2023   
14:          Respiratory rate    18 /min             Sangita Moscoso  
Other Phone:   
[phone]                           [facility]  
Other Phone:   
[phone]  
   
                                                    2023   
14:                              SaO2% (BldA) [Mass   
fraction]                 98 %                      Sangita Moscoso  
Other Phone:   
[phone]                           [facility]  
Other Phone:   
[phone]  
   
                                                    2023   
14:                              Systolic blood   
pressure                  112 mm[Hg]                Sangita Moscoso  
Other Phone:   
[phone]                           [facility]  
Other Phone:   
[phone]  
   
                                                    2022   
15:          Body height         167.64 cm           Breonna Mcmullen  
Work Phone:   
[phone]                          [facility] [address] [facility]  
Work Phone:   
[phone]  
   
                                                    2022   
15:                              Body mass index   
(BMI) [Ratio]             34.38 kg/m2               Breonna Mcmullen  
Work Phone:   
[phone]                          [facility]  
Work Phone:   
[phone]  
   
                                                    2022   
15:                              Body surface area   
Derived from formula      2.05 m2                   Breonna Mcmullen  
Work Phone:   
[phone]                          [facility]  
Work Phone:   
[phone]  
   
                                                    2022   
15:          Body weight         96.62 kg            Breonna Mcmullen  
Work Phone:   
[phone]                          [facility] [address] [facility]  
Work Phone:   
[phone]  
   
                                                    2022   
15:                              Diastolic blood   
pressure                  81 mm[Hg]                 Breonna Mcmullen  
Work Phone:   
[phone]                          [facility]   
[facility] [address] [facility]  
Work Phone:   
[phone]  
   
                                                    2022   
15:          Heart rate          67 /min             Breonna Mcmullen  
Work Phone:   
[phone]                          [facility]   
[facility] [address] [facility]  
Work Phone:   
[phone]  
   
                                                    2022   
15:                              Systolic blood   
pressure                  126 mm[Hg]                Breonna Mcmullen  
Work Phone:   
[phone]                          [facility]   
[employer] [address] DO  
Work Phone:   
[phone]  
   
                                                    2021   
15:          Body height         170.18 cm           [facility]  
Other Phone:   
[phone]                           [facility]  
Other Phone:   
[phone]  
   
                                                    2021   
15:                              Body mass index   
(BMI) [Ratio]             33.59 kg/m2               [facility]  
Other Phone:   
[phone]                           [facility]  
Other Phone:   
[phone]  
   
                                                    2021   
15:          Body temperature    98.9 [degF]         [facility]  
Other Phone:   
[phone]                           [facility]  
Other Phone:   
[phone]  
   
                                                    2021   
15:          Body weight         97.3 kg             [facility]  
Other Phone:   
[phone]                           [facility]  
Other Phone:   
[phone]  
   
                                                    2021   
15:                              Diastolic blood   
pressure                  80 mm[Hg]                 [facility]  
Other Phone:   
[phone]                           [facility]  
Other Phone:   
[phone]  
   
                                                    2021   
15:                              SaO2% (BldA) [Mass   
fraction]                 99 %                      [facility]  
Other Phone:   
[phone]                           [facility]  
Other Phone:   
[phone]  
   
                                                    2021   
15:                              Systolic blood   
pressure                  105 mm[Hg]                [facility]  
Other Phone:   
[phone]                           [facility]  
Other Phone:   
[phone]  
  
  
  
Encounters  
  
  
                          Encounter Date Encounter Type Care Provider Facility  
   
                                                    Start: 01-  
End: 01-           ambulatory                Isreal Bautista  
Other Phone:   
[phone]                           [facility]  
Other Phone:   
[phone]  
   
                                        Start: 01-   Office consultation   
new/estab patient 60   
min                       Isreal Bautista               [facility]  
   
                                                    Start: 12-  
End: 12-     ambulatory          BREONNA MCMULLEN       Not Available  
   
                                        Start: 2023   Office outpatient ne  
w   
45 minutes                Kary White          [facility]   
Neurosurgery  
   
                                                    Start: 2023  
End: 2023     ambulatory          Kary White    Facility:[facility]  
   
                                                    Start: 2023  
End: 2023           ambulatory                MD Breonna Mcmullen  
Work Phone:   
[phone]                          [facility]  
Work Phone:   
[phone]  
   
                                                    Start: 2023  
End: 2023                         Patient encounter   
procedure                               MD Breonna Mcmullen  
Work Phone:   
[phone]                          [facility]  
Work Phone:   
[phone]  
   
                                                    Start: 2023  
End: 2023     ambulatory          KARY BENITEZ    Not Available  
   
                                                    Start: 2023  
End: 2023           ambulatory                Sangita Moscoso  
Other Phone:   
[phone]                           [facility]  
Other Phone:   
[phone]  
   
                                        Start: 2023   Office outpatient vi  
sit   
25 minutes                Sangita Moscoso              [facility]  
   
                                                    Start: 2023  
End: 2023     ambulatory          BERNARDINO CONKLIN .         Facility:[facility]  
   
                          Start: 2023 ambulatory   Dr. Jhony Richardson   
Facility:  
   
                                                    Start: 2022  
End: 2022     ambulatory          AMINA GONZALEZ     Facility:[facility]  
   
                                                    Start: 2022  
End: 2022                         Emergency department   
patient visit             DEMI SMITH [facility]  
   
                          Start: 2022 ambulatory   DR BREONNA MCMULLEN Facility  
:[facility]  
   
                                                    Start: 2022  
End: 2022     ambulatory          DR NATANAEL BELLO Facility:[facility]  
   
                                                    Start: 2022  
End: 2022     ambulatory          DR BREONNA MCMULLEN      Facility:[facility]  
   
                                                    Start: 2022  
End: 2022     ambulatory          TUTU MCCLOUD  Facility:[facility]  
   
                                        Start: 2022   Office outpatient vi  
sit   
25 minutes                              Breonna Mcmullen  
Work Phone:   
[phone]                          [facility]   
[facility] DO  
Work Phone:   
[phone]  
   
                                        Start: 2021   Office outpatient ne  
w   
30 minutes                [facility]   
[facility]  
  
  
  
Procedures  
  
  
                          Date         Procedure    Procedure Detail Performing   
Clinician  
   
                                        Start: 2023   X-ray of lumbar spin  
e,   
six views including   
bending views                                       MD Breonna Mcmullen  
Work Phone:   
[phone]  
   
                                       Bone marrow sampling              Breonna Mcmullen  
Work Phone:   
[phone]  
   
                                       Operation on mouth              Breonna Mcmullen  
Work Phone:   
[phone]  
   
                                                            Operation on the inn  
er   
ear                                                 Breonna Mcmullen  
Work Phone:   
[phone]  
   
                                                            Scrotum and testicle  
   
operation                                           Breonna Mcmullen  
Work Phone:   
[phone]  
   
                                                    NEGATED: Highlighted   
row has not occurred! Total colonoscopy                       Breonna DELFIN Mcmullen  
Work Phone:   
[phone]  
  
  
  
Plan of Treatment  
  
  
                          Date         Care Activity Detail       Author  
   
                                        Start: 2023   [facility], Provider:   
Jhony Richardson,   
Status: Pen, Time: 1:00   
PM                                      [facility], Provider:   
Jhony Richardson,   
Status: Pen, Time:   
1:00 PM                                 [facility]  
Work Phone:   
[phone]  
  
  
  
Payers  
  
  
                          Date         Payer Category Payer        Policy ID  
   
                          2023   Self-pay                  [id number]  
[id number]  
   
                          10-   [employer] [place] [employer]              [id number]  
[id number]  
   
                          2022   Unknown                   [id number]  
   
                          2021   Medicare                  [id number] 2.  
[id number]  
   
                          2019   Medicare                  [id number]   
[id number]  
   
                          1979   Unknown                   [id number] 2.16.8  
[id number]  
   
                          1979   Unknown                   [id number] 2.16.  
[id number]  
   
                          1979   Unknown                   [id number] 2.16.84  
[id number]  
   
                          1979   Unknown                   [id number] 2.16.84  
[id number]  
   
                          1979   Unknown                   [id number] 2.16.84  
[id number]  
   
                          1979   Unknown                   [id number] 2.16.84  
[id number]  
   
                          1979   Unknown                   [id number] 2.16.84  
[id number]  
   
                          1979   Unknown                   [id number] 2.16.84  
[id number]  
   
                          1979   Unknown                   [id number]  
[id number]  
   
                          1979   Unknown                   [id number]  
[id number]  
   
                          1960   Medicaid                  [id number] 2.  
[id number]  
   
                          1960   Private Health Insurance              U86  
[id number] 1960   Unknown                   [id number]  
   
                          1960   Unknown                   [id number]  
   
                                       Blue [employer] Shield              [id number]  
[id number]  
   
                                       Private Health Insurance              U86  
[id number]  
   
                                       Unknown                     
   
                                       Unknown                   [id number] 2.16.8  
[id number]  
  
  
  
Social History  
  
  
                          Date         Type         Detail       Facility  
   
                                       No alcohol use No alcohol use [facility]  
Other Phone:   
[phone]  
   
                                        Comment on above:   1 cup-2 cups coffee   
in AM, 2-3 POPS A DAY;   
   
                                       Sex Assigned At Birth Sex Assigned At Bir  
th [facility]  
Other Phone:   
[phone]  
   
                          Start: 1980 Sex Assigned At Birth Male         F  
[facility]  
  
  
  
Evaluation note 01-  
  
  
                                Note Date & Type Note            Facility  
  
  
  
                                                    01- Evaluation note   
  
  
  
                                                    Encounter   
Date                      Diagnosis                 Assessment Notes  
   
                                                    15 Speedy,   
2024                                    Lumbar   
radiculopathy   
(ICD-10 -   
M54.16)                                                     44 y/o male here   
to discuss his   
chronic pain. He   
states approx. 1   
year ago he   
experienced low   
back and   
intermittent   
bilateral lower   
extremity pain.   
He states he   
attempted PT at   
the onset of pain   
which provided   
minimal   
improvement in   
his symptoms. He   
notes he recently   
purchased a new   
bed and no longer   
has pain. Prior   
to examining the   
patient, I   
reviewed progress   
notes from his   
referring   
provider Kary White.   
Pertinent imaging   
of the lumbar   
spine was   
reviewed and   
discussed in   
detail with the   
patient which   
shows   
degenerative   
changes. Anatomy   
of spine   
discussed in   
detail with   
patient in   
regards to   
patients   
condition.   
Overall, he   
appears to be   
doing well and   
does not require   
interventional   
treatment at this   
time. He is   
encouraged to   
call the office   
if his symptoms   
return  
  
   
                                                    15 Speedy,   
2024                                    Sacroiliitis   
(ICD-10 - M46.1)                                            In the future if   
the pain   
persists, we can   
consider   
proceeding with a   
sacroiliac joint   
injection under   
fluoroscopic   
guidance.  
  
   
                                                    15 Speedy,   
2024                                    Chronic pain   
(ICD-10 -   
G89.29)                                                     UDS performed   
through   
[employer]   
today, will await   
confirmatory   
results. Patient   
is encouraged to   
call the office   
if his symptoms   
return  
  
   
                                                    15 Speedy,   
2024            Other                                           Medical decision  
   
making shows a   
new problem to me   
with further   
workup planned or   
suggested with   
the potential for   
extensive   
treatment options   
that were   
considered with   
the most   
applicable given   
this patient's   
situation as   
noted above.   
Treatment options   
considered   
include a   
combination of   
physical therapy   
approaches,   
pharmacologic   
management, and   
interventional   
procedures. Those   
most applicable   
to the patient   
were discussed at   
this time. Risk   
of complications   
and/or morbidity   
and mortality is   
high given that   
acute and chronic   
pain poses a   
threat to life   
and bodily   
function if   
undertreated,   
poorly treated or   
with failure to   
maintain adequate   
treatment and   
timely followup.   
Given the serious   
and fluctuating   
nature of pain   
with extensive   
consideration for   
whenever pain   
changes, there   
always remains   
the possibility   
of prolonged   
functional   
impairment   
requiring   
constant patient   
reassessment and   
high-level   
medical decision   
making. The   
amount and   
complexity of   
data reviewed is   
high given that   
patient labs,   
radiology   
reports, and   
other test were   
obtained,   
reviewed and   
summarized as   
applicable from   
the physician   
portal and/or   
outside medical   
records.   
Pertinent   
positive and   
negative findings   
were considered   
in medical   
decision-making.  
  
  
[facility]  
Other Phone: [phone]  
  
Evaluation note 2023  
  
  
                                Note Date & Type Note            Facility  
  
  
  
                                                    2023 Evaluation note   
  
  
  
                                                    Encounter   
Date                      Diagnosis                 Assessment   
Notes  
   
                                                    11 Dec,   
2023                                    Lumbar   
radiculopathy   
(ICD-10 -   
M54.16)                                             Independently   
reviewed the MRI   
of the lumbar   
spine from   
2023 with   
patient   
face-to-face and   
which shows at   
the L3-4 there is   
a mild to   
moderate disc   
space narrowing   
with mild diffuse   
disc herniation   
and mild to   
moderate   
hypertrophic   
facet limited to   
flavum changes   
with mild central   
spinal stenosis   
and   
neuroforaminal   
narrowing. At   
L4-5 there is a   
grade 1   
anterolisthesis   
mild disc space   
narrowing with   
moderate disc   
herniation and   
marked   
hypertrophic   
facet changes   
with results in   
mild to moderate   
central spinal   
canal stenosis   
lateral recess   
neuroforaminal   
narrowing greater   
on the right,   
consistent with   
the patient's   
radicular   
symptoms. At   
L5-S1 there is a   
mild disc   
herniation   
without   
significant   
central spinal   
stenosis.   
Discussion of   
conservative   
thearpy in which   
patient has had   
[facility]   
in [place], with   
minimal relief.   
Will get release   
of information   
for continuity of   
care. WIll order   
xray of lumbar   
6view today. PDMP   
reviewed no   
controlled   
prescriptions.   
Pharmacological   
managment will   
continue with   
current   
medication as   
prescribed. Will   
refer to Dr Bautista   
for sacroiliac   
injections.   
Follow up in 12   
weeks.  
Medical decision   
making shows a  
new problem to me   
with further   
workup planned or   
suggested with   
the potential  
for extensive   
treatment options   
that were   
considered with   
the most   
applicable  
given this   
patient's   
situation as   
noted above.   
Treatment options   
considered  
include a   
combination of   
physical therapy   
approaches,   
pharmacologic   
management,  
and   
interventional   
procedures. Those   
most applicable   
to the patient   
were  
discussed at this   
time. Risk of   
complications   
and/or morbidity   
and mortality is  
high given that   
acute and chronic   
pain poses a   
threat to life   
and bodily   
function  
if undertreated,   
poorly treated or   
with failure to   
maintain adequate   
treatment  
and timely follow   
up. Given the   
serious and   
fluctuating   
nature of pain   
with  
extensive   
consideration for   
whenever pain   
changes, there   
always remains   
the  
possibility of   
prolonged   
functional   
impairment   
requiring   
constant patient  
reassessment and   
high-level   
medical decision   
making. The   
amount and   
complexity  
of data reviewed   
is moderate given   
that patient   
labs, radiology   
reports, and  
other test were   
obtained,   
reviewed and   
summarized as   
applicable from   
the  
physician portal   
and/or outside   
medical records.   
Pertinent   
positive and  
negative findings   
were considered   
in medical   
decision-making.  
                                          
   
                                                    11 Dec,   
2023                                    [place] toe,   
right (ICD-10 -   
M20.5X1)                                                      
   
                                                    11 Dec,   
2023                                    [place] toe, left   
(ICD-10 -   
M20.5X2)                                                      
   
                                                    11 Dec,   
2023                                    Sacroiliac   
inflammation   
(ICD-10 - M46.1)                                              
  
  
[facility]  
Other Phone: [phone]  
  
Evaluation note 2023  
  
  
                                Note Date & Type Note            Facility  
  
  
  
                                                    2023 Evaluation note   
  
  
  
                                                    Encounter   
Date                      Diagnosis                 Assessment Notes  
   
                                                    12 Sep,   
2023                                    Sore   
throat   
(ICD-10 -   
J02.9)                                                        
   
                                                    12 Sep,   
2023                                    Viral URI   
with cough   
(ICD-10 -   
J06.9)                                                      Advised patient   
that COVID PCR   
test and rapid  
Strep test was   
negative today.   
Advised patient   
that will treat   
as viral  
URI. Supportive   
care as directed,   
increase fluids   
and rest, Tylenol   
as  
directed, rx of   
Bromfed and   
prednisone, cool   
mist  
humidifier,   
throat lozenges.   
Discussed   
infection control   
practices such as   
good  
hand washing and   
mask wearing.   
Work note   
provided, no   
extension   
allowed. Patient   
to follow up with   
PCP if symptoms  
persist or worsen   
despite   
treatment.   
Immediate eval   
for SOB,   
difficulty  
breathing, chest   
pain, fevers that   
do not break with   
antipyretic or   
any other  
concerning   
symptoms as   
reviewed on   
patient education   
handout. Patient  
verbalizes   
understanding and   
is agreeable to   
treatment plan.   
Patient left in  
stable condition  
  
  
  
[facility]  
Other Phone: [phone]  
  
Evaluation note 2021  
  
  
                                Note Date & Type Note            Facility  
  
  
  
                                                    2021 Evaluation note   
  
  
  
                                                    Encounter   
Date                      Diagnosis                 Assessment   
Notes  
   
                                                                                        Obstructive   
sleep apnea   
(ICD-10 -   
G47.33)                                               
  
  
Discussed the   
diagnosis of   
obstructive   
sleep apnea,   
findings on   
previous sleep   
study which was   
obtained from   
[place] [facility]   
[facility]atory,   
persistent   
symptoms,   
reevaluating   
the presence   
and severity of   
sleep   
disordered   
breathing   
specially for   
insurance   
requirements to   
be able to   
initiate   
positive   
pressure   
therapy.                                  
  
  
  
  
[facility]  
Other Phone: [phone]  
  
Evaluation note  
  
  
                                Note Date & Type Note            Facility  
   
                                Evaluation note No assessment information availa  
[facility]   
Ctr  
Work Phone: [phone]  
  
  
  
History general Narrative - Reported  
  
  
                                Note Date & Type Note            Facility  
  
  
  
                                                    History general Narrative -   
Reported   
  
  
  
                                                    Type  
   
                                Medical History epilepsy          
   
                                Medical History herniated disc    
   
                                        Surgical History    abdominal surgery (t  
esticals tied to   
pelvic bone)                              
   
                                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   
ear   
   
                                Hospitalization History abdominal surgery   
   
                                Hospitalization History bone marrow transplant 1  
991  
  
  
[facility]  
Other Phone: [phone]  
  
History general Narrative - Reported  
  
  
                                Note Date & Type Note            Facility  
  
  
  
                                                    History general Narrative -   
Reported   
  
  
  
                                                    Type  
   
                                Medical History epilepsy          
   
                                Medical History herniated disc    
   
                                Medical History heart disease     
   
                                        Surgical History    abdominal surgery (t  
esticals tied to   
pelvic bone)                              
   
                                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   
ear   
   
                                Hospitalization History abdominal surgery   
   
                                Hospitalization History bone marrow transplant 1  
991  
  
  
[facility]  
Other Phone: [phone]  
  
History of Present illness Narrative  
  
  
                                Note Date & Type Note            Facility  
   
                                                    History of Present illness   
Narrative                               Patient is here for follow-up he   
was evaluated in the past for   
symptoms of palpitation,   
obesity, shortness of breath and   
hyperlipidemia. Since last time   
I saw him he reports that he   
developed Covid. He was having   
some shortness of breath and   
cough. Also had few episodes of   
palpitation. He denies   
lightheadedness, dizziness or   
syncope. Apparently he is was   
seen by his family physician and   
a stress test was recommended   
but the patient failed to show   
up for that. His recent sleep   
study was inadequate and he is   
telling me that he will repeated   
in the near future.Assessment1.   
Palpitation . His event monitor   
despite complaint of palpitation   
failed to demonstrate any   
significant arrhythmia. Patient   
mother indicated that he does   
have an element of anxiety. And   
documentation of recurrence   
atrial fibrillation2. Prior   
history of one episode of atrial   
fibrillation with low chads   
vascular score3. Obesity   
significant weight changes4.   
Hyperlipidemia on treatment5.   
Shortness of breath related to   
prior tobacco use6. Tobacco   
use7. Patient report prior   
diagnosis of sleep apnea is   
scheduled to undergo repeat   
study in the near future8.   
Recent COVID-19 infectionPlan1.   
Patient was counseled regarding   
risk factor modification2.   
Patient was advised to remain on   
an aspirin 81 mg once daily3.   
Patient was counseled regarding   
losing weight, exercise and   
dietary modification4. With   
repeating his sleep study5. I do   
not find me if he develop any   
recurrence of his symptoms of   
palpitation or chest pain6. I   
suggested 6 month follow-up and   
I advised the patient to try to   
lose 10 pounds by next time7. We   
will see him in 1 year in   
follow-up or earlier if the need   
arise                                   [facility]   
[facility] [address] DO  
Work Phone: [phone]  
  
  
  
Chief Complaint  
NATANAEL GANDHI is being seen for a 6 month follow-up of.  
  
Family History  
                              Unknown Family Member  
  
                                Name            Dates           Details  
   
                                                    Family history of cerebrovas  
cular accident (CVA): Father(V17.1,   
Z82.3)  
                                                    Status:Active  
   
                                                    Family history of diabetes javid moon: Father(V18.0, Z83.3)  
                                                    Status:Active  
  
  
  
Summary Purpose  
  
  
                                                      
  
  
  
Advance Directives  
  
  
                                Advance Directive Response        Recorded Date/  
Time  
   
                                Advance Directives No              2023 4:42pm  
  
  
  
Chief Complaint and Reason for Visit  
  
  
                                        Chief Complaint     M54.16  
  
  
  
Reason for Referral  
  
  
                                        Reason              evaluate and treat  
   
                                        Diagnosis 1         Lumbar radiculopathy  
 (M54.16)  
   
                                        Referral Organization [facility]  
urosurgery  
   
                                        Referring Provider First Name Kary  
   
                                        Referring Provider Last Name Christopher  
   
                                        Referring Provider Specialty Nurse Pract  
itosei  
   
                                        Referred Organization [facility]  
t  
   
                                        Referred Provider   Isreal Bautista  
   
                                        Referred Address    [address] [place] [id number]  
   
                                        Referred Provider Specialty Pain Medicin  
e  
   
                                        Referral Priority   Routine  
   
                                        Referral Appointment Date 2024-01-15  
   
                                        General Notes       Felicia Crowder  11:41:15 AM >received   
today, MRI was completed at [facility], sending p2p at this   
time for scheduling  
Felicia Crowder 2023 01:08:17 PM >pt has been   
scheduled  
  
  
  
Additional Source Comments  
  
  
  
                                                    PREGNANCY (unrecognized sect  
ion and content)  
   
                                                    No Pregnancy Status Records   
FoundNo Pregnancy Status Records FoundNo Pregnancy   
Status   
Records FoundNo Pregnancy Status Records FoundNo Pregnancy Status Records 
FoundNo   
Pregnancy Status Records FoundNo Pregnancy Status Records FoundNo Pregnancy 
Status   
Records Found  
  
  
  
  
                                                    INFORMATION SOURCE (unrecogn  
ized section and content)  
   
                                          
  
  
  
                                        DATE CREATED        AUTHOR  
   
                                2022                      [facility]  
ical Center  
  
  
  
                                DATE CREATED    AUTHOR          AUTHOR'S ORGANIZ  
ATION  
   
                                08/10/2022                      [facility]  
dical Specialist  
  
  
  
                                DATE CREATED    AUTHOR          AUTHOR'S ORGANIZ  
ATION  
   
                                2022                      [facility]  
sp[facility]  
  
  
  
                                DATE CREATED    AUTHOR          AUTHOR'S ORGANIZ  
ATION  
   
                                02/15/2023                      [facility]  
ical Center  
  
  
  
                                DATE CREATED    AUTHOR          AUTHOR'S ORGANIZ  
ATION  
   
                                02/15/2023                       [facility]  
  
  
  
                                DATE CREATED    AUTHOR          AUTHOR'S ORGANIZ  
ATION  
   
                                2023                      [facility]  
pital  
  
  
  
                                DATE CREATED    AUTHOR          AUTHOR'S ORGANIZ  
ATION  
   
                                2023                      [facility]  
dical Specialists [facility]  
  
  
  
                                DATE CREATED    AUTHOR          AUTHOR'S ORGANIZ  
ATION  
   
                                2024                      [facility]  
  
  
  
  
  
                                                    REASON FOR VISIT (unrecogniz  
ed section and content)  
   
                                                    SLEEP LABPOSSIBLE STREP THRO  
AT, SWOLLEN GLANDSreferred by Dr. Mcmullen acute low   
back   
pain w/sciaticaREFF BY KARY WHITE, CONSULT FOR LUMBAR RADICULOPATHY  
  
  
  
  
                                                    Care Teams (unrecognized sec  
tion and content)  
   
                                          
  
  
  
                                                    Team Status: Active   
   
                          Member       Role         Status       Dates  
   
                          Breonna Mcmullen MD Primary Care Provider Active         
  
  
  
                                                    Team Status: Inactive   
   
                          Member       Role         Status       Dates  
   
                          Breonna Mcmullen MD Primary Care Provider Active         
   
                          Kary White , NP-C Attending Provider Active       
    
  
  
  
  
  
                                                    Goals (unrecognized section   
and content)  
   
                                                    Goals may be documented in a  
n alternate section  
  
FOR RECORDS PERTAINING TO PATIENTS WHO ARE OR HAVE BEEN ENROLLED IN A CHEMICAL 
DEPENDENCY/SUBSTANCEABUSE PROGRAM, SOME INFORMATION MAY BE OMITTED. This 
clinical summary was aggregated from multiple sources. Caution should be 
exercised in using it in the provision of clinical care. This summary normalizes
 information from multiple sources, and as a consequence, information in this 
document may materially change the coding, format and clinical context of 
patient data. In addition, data may be omitted in some cases. CLINICAL DECISIONS
 SHOULD BE BASED ON THE PRIMARY CLINICAL RECORDS. [employer] provides
 no warranty or guarantee of the accuracy or completeness of information in this
 document.

## 2024-01-25 PROBLEM — E78.1 HYPERTRIGLYCERIDEMIA: Status: ACTIVE | Noted: 2024-01-25

## 2024-01-25 PROBLEM — R00.1 SINUS BRADYCARDIA: Status: ACTIVE | Noted: 2024-01-25

## 2024-01-25 PROBLEM — R06.02 SHORTNESS OF BREATH: Status: ACTIVE | Noted: 2024-01-25

## 2024-01-25 PROBLEM — R00.2 PALPITATIONS: Status: ACTIVE | Noted: 2024-01-25

## 2024-01-25 PROBLEM — I48.0 PAROXYSMAL ATRIAL FIBRILLATION (MULTI): Status: ACTIVE | Noted: 2024-01-25

## 2024-01-25 PROBLEM — G40.909 SEIZURE DISORDER (MULTI): Status: ACTIVE | Noted: 2024-01-25

## 2024-01-25 PROBLEM — F17.200 CURRENT SMOKER ON SOME DAYS: Status: ACTIVE | Noted: 2024-01-25

## 2024-01-25 PROBLEM — G47.30 SLEEP APNEA: Status: ACTIVE | Noted: 2024-01-25

## 2024-01-25 RX ORDER — ASPIRIN 81 MG/1
1 TABLET ORAL DAILY
COMMUNITY

## 2024-01-25 RX ORDER — LEVETIRACETAM 100 MG/ML
7.5 SOLUTION ORAL 2 TIMES DAILY
COMMUNITY

## 2024-01-25 RX ORDER — SIMVASTATIN 20 MG/1
1 TABLET, FILM COATED ORAL EVERY EVENING
COMMUNITY
Start: 2021-08-02

## 2024-03-02 ENCOUNTER — HOSPITAL ENCOUNTER (EMERGENCY)
Dept: HOSPITAL 101 - ER | Age: 44
Discharge: LEFT BEFORE DISCHARGE | End: 2024-03-02
Payer: COMMERCIAL

## 2024-03-02 DIAGNOSIS — Z53.21: Primary | ICD-10-CM

## 2024-03-02 NOTE — XMS_ITS
Comprehensive CCD (C-CDA v2.1)  
  
                            Created on: 2024  
  
  
Natanael Gandhi  
External Reference #: [id number]  
: 1980  
Sex: Male  
  
Demographics  
  
  
                                        Address             [address] [place]  [address]  
   
                                        Mobile Phone        [phone]  
   
                                        Preferred Language  en  
   
                                        Marital Status      Single  
   
                                        Buddhism Affiliation Unknown  
   
                                        Race                White  
   
                                        Ethnic Group        Not  or Lati  
no  
  
  
Author  
  
  
                                        Name                Unknown  
   
                                        Address             [address]  
[address]  
[place]  [address]  
   
                                        Phone               [phone]  
   
                                        Organization        [facility]  
  
  
Care Team Providers  
  
  
                                Care Team Member Name Role            Phone  
   
                                Breonna Mcmullen  Unavailable     [phone]  
   
                                Unavailable     Unavailable     [phone]  
   
                                Yvrose Kc   Unavailable     [phone]  
   
                                DEMI PAYNE Attending       Unavailable  
   
                                BREONNA MCMULLEN  Primary Care    Unavailable  
   
                                Debra, Dr. Booker Attending       Unavaila  
amanda Richardson, Dr. Booker Referring       Unavaila  
Breonna Perea Primary Care    Unavailable  
   
                                JOSIAS, DR RAVI Admitting       Unavailable  
   
                                JOSIAS, DR RAVI Attending       Unavailable  
   
                                JOSIAS, DR RAVI Primary Care    Unavailable  
   
                                AMINA GONZALEZ Attending       Unavailable  
   
                                JOSIAS, DR RAVI Primary Care    Unavailable  
   
                                AMINA GONZALEZ Consulting      Unavailable  
   
                                AMINA GONZALEZ Admitting       Unavailable  
   
                                TUTU MCCLOUD Admitting       Unavailable  
   
                                MAXIMUS, DR EDILBERTO GALE Consulting      Unavailable  
   
                                [facility], DR CAM Primary Care    Unavailable  
   
                                TUTU MCCLOUD Attending       Unavailable  
   
                                TUTU MCCLOUD Consulting      Unavailable  
   
                                JOSIAS, DR RAVI Primary Care    Unavailable  
   
                                TUTU MCCLOUD Admitting       Unavailable  
   
                                TUTU MCCLOUD Attending       Unavailable  
   
                                KATERIN .BERNARDINO    Consulting      Unavailable  
   
                                JOSIAS, DR RAVI Primary Care    Unavailable  
   
                                DIAB ., STACY  Admitting       Unavailable  
   
                                DIAB ., STACY  Attending       Unavailable  
   
                                DIAB ., STACY  Consulting      Unavailable  
   
                                GEOVANNI LIMDA  Consulting      Unavailable  
   
                                EUGENIA, DR NATANAEL COSTA Admitting       Unavailreno MCMULLEN, DR RAVI Primary Care    Unavailable  
   
                                DR ANTANAEL BELLO Attending       UnavailTORI Alfaro Consulting      Unavailable  
   
                                TORI STRINGER  Consulting      Unavailable  
   
                                Sangita Moscoso   Unavailable     [phone]  
   
                                MD Breonna Mcmullen Primary Care Provider [phone]  
   
                                ARNOL White Attending Provider [phone]  
   
                                Kary White Unavailable     [phone]  
   
                                KAYR BENITEZ Referring       Unavailable  
   
                                BREONNA MCMULLEN  Referring       Unavailable  
   
                                KARY BENITEZ Attending       Unavailable  
   
                                Isreal Bautista    Unavailable     [phone]  
   
                                Kary White Attending       Unavailable  
   
                                Kary White Admitting       Unavailable  
   
                                Breonna Mcmullen    Primary Care    Unavailable  
  
  
  
Allergies  
  
  
                                                    Allergy   
Classification                          Reported   
Allergen(s)                             Allergy   
Type                                    Date of   
Onset                     Reaction(s)               Facility  
   
                                                      
(5 sources)                             diphenhydrAMINE;   
Translations:   
[Benadryl TABS]                         Drug   
Allergy                                             Hypertension,   
hyperactivity                           [facility]   
[facility]   
[address] DO  
Work Phone:   
[phone]  
   
                                                      
(1 source)                              topiramate;   
Translations:   
[Topiragen TABS]                        Drug   
Allergy                                 Depression          [facility]   
[facility] DO  
Work Phone:   
[phone]  
   
                                                      
(2 sources)                             diphenhydrAMINE;   
Translations:   
[Benadryl]                              Drug   
Allergy                                 hyperactivity       [facility]   
Repository  
   
                                                      
(6 sources)               topiramate                Drug   
Allergy                                 20  
24                        suicidal                  [facility]  
   
                                                      
(5 sources)               Bee Sting                 Drug   
allergy                                 Unknown             [facility]   
Professional   
Corporation  
Other Phone:   
[phone]  
   
                                                      
(1 source)                predniSONE                Drug   
Allergy                                 20  
23                                                  [facility]   
Repository  
   
                                                      
(1 source)                topiramate                Drug   
Allergy                                                     [facility]   
Repository  
   
                                                      
(2 sources)               diphenhydrAMINE           Drug   
Allergy                                 01-15-20  
24                        hyperactivity             [facility]   
Repository  
   
                                                      
(1 source)                topiramate                Drug   
Allergy                                 01-15-20  
24                                                  [facility]   
Repository  
   
                                                      
(2 sources)                             bee venom protein   
(honey bee)                             Drug   
allergy   
(disorder)                              01-15-20  
24                        Anaphylaxis               [facility]   
Repository  
  
  
  
Medications  
Current Medications  
  
  
  
                      Medication Drug Class(es) Dates      Sig (Normalized) Sig   
(Original)  
   
                                                    Aspir-81  
(5 sources)                                                     Aspir-81 Active  
   
                                                    aspirin 81 mg   
delayed release   
oral tablet  
(2 sources)                             Platelet Aggregation   
Inhibitor,   
Nonsteroidal   
Anti-inflammatory   
Drug                                    Start:   
2024                                          Aspirin (Adult   
Low Dose Aspirin)   
81 mg   
tablet,delayed   
release (DR/EC)   
Active 81 MG PO   
Daily 2024   
12:00am  
  
  
  
                                                take 1 tablet by mouth once aura  
y Aspirin EC 81 MG Oral Tablet Delayed Release   
TAKE   
1 TABLET DAILY. Quantity: 90 Refills: 3 Ordered:   
2-2022 Jhony Richardson MD Active fill   
only if requested  
  
  
  
                                                    levETIRAcetam 100 mg/ml   
oral solution  
(11 sources)                            Start: 2024   take 7.5 mL by   
mouth every   
twelve hours                            Levetiracetam Active 7.5   
ML PO Every 12 hours   
2024   
12:00am FreeTextSi.5   
ml Orally every 12 hrs;   
Note: Source Status:   
Not-Taking\PRN; Provider:   
Michele Bach (NPI:   
[id number])  
  
  
  
                                                            take 7.5 mL by mouth  
 every twelve   
hours as needed                         Keppra 100 MG/ML 7.5 ml Orally every 12   
hrs   
for 30 day(s) Not-Taking/PRN  
   
                                                                levETIRAcetam Ac  
tive  
  
  
  
                                                    naproxen 500 mg   
oral tablet  
(1 source)                              Nonsteroidal   
Anti-inflammatory Drug                  Start:   
2024                              take 500 mg   
by mouth   
twice daily                             Naproxen Active   
500 MG PO Twice   
daily 14  12:00am  
   
                                                    simvastatin 20 mg   
oral tablet  
(7 sources)                             HMG-CoA Reductase   
Inhibitor                               Start:   
2024                              take 1 tablet   
by mouth once   
daily in the   
evening                                 Simvastatin   
Active 1 TAB PO   
Daily 2024   
12:00am   
FreeTextSi   
tablet in the   
evening Orally   
Once a day;   
Note: Source   
Status: Taking;   
Provider:   
Michele Bach   
(NPI:   
[id number])  
  
  
  
                                        Start: 2021   take 1 tablet by chandra  
 once   
daily in the evening                    Simvastatin 20 MG Oral Tablet TAKE   
ONE TABLET BY MOUTH ONCE DAILY IN   
THE EVENING Quantity: 90 Refills: 3   
Ordered: 2022 Jhony Richardson MD Start : 2-Aug-2021 Active  
  
  
  
Completed/Discontinued Medications  
  
  
  
                      Medication Drug Class(es) Dates      Sig (Normalized) Sig   
(Original)  
   
                                                    brompheniramine   
maleate 0.4 mg/ml /   
dextromethorphan   
hydrobromide 2 mg/ml /   
pseudoephedrine   
hydrochloride 6 mg/ml   
oral solution  
(4 sources)                             alpha-Adrenergic   
Agonist,   
Uncompetitive   
N-methyl-D-aspartat  
e Receptor   
Antagonist, Sigma-1   
Agonist                                 Start:   
2023                              take 10 mL by   
mouth every six   
hours as needed                         Pseudoeph-Bromp  
hen-DM 30-2-10   
MG/5ML 10 mL   
Orally every 6   
hours for 5   
days 12 Sep,   
2023   
Not-Taking/PRN  
   
                                                    loratadine 1 mg/ml   
oral solution  
(5 sources)                                                 take 10 mL by   
mouth once daily   
as needed                               Claritin 5   
MG/5ML 10 ml   
Orally Once a   
day for 30   
day(s)   
Not-Taking/PRN  
  
  
  
                                                take 10 mL by mouth once daily C  
laritin 5 MG/5ML 10 ml Orally Once a day for 30  
   
day(s) Active  
  
  
  
                                                    predniSONE 20 mg oral   
tablet  
(4 sources)                             Start: 2023   take 1 tablet by   
mouth every twelve   
hours                                   prednisone 20 MG 1 tablet   
Orally BID for 5 12 Sep,   
2023 Not-Taking/PRN  
  
  
  
Problems  
Active Problems  
  
  
                      Problem Classification Problem    Date       Documented Da  
te Episodic/Chronic  
   
                                                    Acquired foot   
deformities  
(1 source)                              Other deformities   
of toe(s)   
(acquired), right   
foot                                                        Episodic  
   
                                                    Acquired foot   
deformities  
(1 source)                              Other deformities   
of toe(s)   
(acquired), left   
foot                                                        Episodic  
   
                                                    Cardiac dysrhythmias  
(1 source)                              Paroxysmal atrial   
fibrillation;   
Translations:   
[Atrial   
fibrillation]                                               Chronic  
   
                                                    Cardiac dysrhythmias  
(2 sources)                             Palpitations;   
Translations:   
[Palpitations]                                              Episodic  
   
                                                    Crushing injury or   
internal injury  
(4 sources)                             Crushing injury of   
left ring finger,   
initial encounter;   
Translations:   
[CRUSHING INJURY LT   
RING FINGER INIT]                       Onset:   
2023                                          Episodic  
   
                                                    Disorders of lipid   
metabolism  
(1 source)                              Hypertriglyceridemi  
a; Translations:   
[Pure   
hyperglyceridemia]                                          Chronic  
   
                                                    E Codes: Other   
specified and   
classifiable  
(1 source)                              Caught, crushed,   
jammed, or pinched   
between moving   
objects, initial   
encounter;   
Translations:   
[CAUGHT CRUSH/PINCH   
BTWN MOV OBJ INT]                       Onset:   
2023                                          Episodic  
   
                                                    Epilepsy; convulsions  
(1 source)                              Seizure disorder;   
Translations:   
[Epilepsy,   
unspecified,   
without mention of   
intractable   
epilepsy]                                                   Chronic  
   
                                                    Influenza  
(1 source)                              Influenza due to   
other identified   
influenza virus   
with other   
respiratory   
manifestations;   
Translations: [FLU   
D/T OTH ID FLU VIR   
OTH RSP MANF]                           Onset:   
2022                                          Episodic  
   
                                                    Other acquired   
deformities  
(1 source)                              Contracture,   
unspecified foot;   
Translations:   
[CONTRACTURE   
UNSPECIFIED FOOT]                       Onset:   
2022                                          Chronic  
   
                                                    Other aftercare  
(1 source)                              Long term (current)   
use of aspirin;   
Translations: [LONG   
TERM CURRENT USE OF   
ASPIRIN]                                Onset:   
2022                                          Episodic  
   
                                                    Other aftercare  
(1 source)                              Other long term   
(current) drug   
therapy;   
Translations: [OTH   
LONG TERM CURRENT   
DRUG THERAPY]                           Onset:   
2022                                          Episodic  
   
                                                    Other lower   
respiratory disease  
(1 source)                              Dyspnea;   
Translations:   
[Shortness of   
breath]                                                     Episodic  
   
                                                    Other nervous system   
disorders  
(2 sources)                             Chronic pain;   
Translations:   
[Other chronic   
pain]                                                       Chronic  
   
                                                    Other nervous system   
disorders  
(1 source)      Other chronic pain                                 Chronic  
   
                                                    Other nutritional;   
endocrine; and   
metabolic disorders  
(1 source)                              Obesity;   
Translations:   
[Obesity,   
unspecified]                                                Chronic  
   
                                                    Other upper   
respiratory infections  
(2 sources)                             Acute pharyngitis,   
unspecified;   
Translations:   
[Acute upper   
respiratory   
infection,   
unspecified]                                                Episodic  
   
                                                    Residual codes;   
unclassified  
(1 source)                              Sleep apnea;   
Translations:   
[Unspecified sleep   
apnea]                                                      Chronic  
   
                                                    Residual codes;   
unclassified  
(5 sources)                             Obstructive sleep   
apnea syndrome;   
Translations:   
[Obstructive sleep   
apnea (adult)   
(pediatric)]                                                Chronic  
   
                                                    Residual codes;   
unclassified  
(1 source)                              Obstructive sleep   
apnea (adult)   
(pediatric);   
Translations:   
[Obstructive sleep   
apnea G47.33]                           Onset:   
2021  
Resolved:   
2021                                          Chronic  
   
                                                    Spondylosis;   
intervertebral disc   
disorders; other back   
problems  
(5 sources)                             Inflammation of   
sacroiliac joint;   
Translations:   
[Sacroiliitis, not   
elsewhere   
classified]                                                 Chronic  
   
                                                    Spondylosis;   
intervertebral disc   
disorders; other back   
problems  
(4 sources)                             Sciatica, right   
side; Translations:   
[Radiculopathy,   
lumbar region]                          Onset:   
2022                                          Episodic  
   
                                                    Sprains and strains  
(6 sources)                             Lumbosacral strain;   
Translations:   
[Lumbosacral   
strain]                                 Onset:   
2022                                          Episodic  
   
                                                    Substance-related   
disorders  
(2 sources)                             Occasional tobacco   
smoker;   
Translations:   
[Tobacco use   
disorder]                               Onset:   
2022                                          Chronic  
   
                                                    Superficial injury;   
contusion  
(1 source)                              Contusion of left   
ring finger with   
damage to nail,   
initial encounter;   
Translations:   
[CONTUS LT RING   
FINGR DAMGE NAIL   
INT]                                    Onset:   
2023                                          Episodic  
   
                                                    Unclassified  
(2 sources)                             COUGH, UNSPECIFIED;   
Translations:   
[COUGH,   
UNSPECIFIED]                            Onset:   
2022                                            
   
                                                    Unclassified  
(1 source)                              CONTACT W/AND   
(SUSP) EXPOS   
COVID-19;   
Translations:   
[CONTACT W/AND   
(SUSP) EXPOS   
COVID-19]                               Onset:   
2022                                            
  
  
Past or Other Problems  
  
  
                                                    Problem   
Classification  Problem         Date            Documented Date Episodic/Chronic  
   
                                                    E Codes:   
Natural/environment  
(1 source)                              Other and   
unspecified   
overexertion or   
strenuous movements   
or postures, initial   
encounter;   
Translations: [OTH   
AND UNS OVREXRT/STRN   
MVMT/POS INT]                           Onset:   
2022                                          Episodic  
   
                                                    E Codes: Unspecified  
(1 source)                              Activity, digging,   
shoveling and   
raking;   
Translations:   
[ACTIVITY DIGGING   
SHOVELING AND   
RAKING]                                 Onset:   
2022                                          Episodic  
   
                                                    Malaise and fatigue  
(1 source)                              Weakness;   
Translations:   
[WEAKNESS]                              Onset:   
2022                                          Episodic  
   
                                                    Other connective   
tissue disease  
(4 sources)                             Plantar fascial   
fibromatosis;   
Translations:   
[PLANTAR FASCIAL   
FIBROMATOSIS]                           Onset:   
2022                                          Episodic  
   
                                                    Other connective   
tissue disease  
(1 source)                              Pain in right foot;   
Translations: [PAIN   
IN RIGHT FOOT]                          Onset:   
2022                                          Episodic  
   
                                                    Other connective   
tissue disease  
(1 source)                              Pain in left foot;   
Translations: [PAIN   
IN LEFT FOOT]                           Onset:   
2022                                          Episodic  
   
                                                    Other nervous system   
disorders  
(1 source)                              Unspecified   
abnormalities of   
gait and mobility;   
Translations: [UNS   
ABNORMALITIES GAIT   
AND MOBILITY]                           Onset:   
2022                                          Episodic  
   
                                                    Other non-traumatic   
joint disorders  
(4 sources)                             Pain in right   
shoulder;   
Translations: [PAIN   
IN RIGHT SHOULDER]                      Onset:   
2022                                          Episodic  
   
                                                    Other non-traumatic   
joint disorders  
(4 sources)                             Pain in right ankle   
and joints of right   
foot; Translations:   
[PAIN IN RIGHT   
ANKLE]                                  Onset:   
2022                                          Episodic  
   
                                                    Other non-traumatic   
joint disorders  
(1 source)                              Pain in left ankle   
and joints of left   
foot; Translations:   
[PAIN IN LEFT ANKLE]                    Onset:   
2022                                          Episodic  
   
                                                    Other nutritional;   
endocrine; and   
metabolic disorders  
(1 source)                              Body mass index 30+   
- obesity;   
Translations:   
[Obesity,   
unspecified]                              
Resolved:   
2022                                          Chronic  
   
                                                    Screening and history   
of mental health and   
substance abuse codes  
(2 sources)                             Ex-smoker;   
Translations:   
[Personal history of   
tobacco use]                            Onset:   
2022                                          Episodic  
   
                                                    Unclassified  
(1 source)                              COUGH, UNSPECIFIED;   
Translations:   
[COUGH, UNSPECIFIED]                    Onset:   
2022                                            
  
  
  
Results  
  
  
                          Test Name    Value        Interpretation Reference   
Range                                   Facility  
   
                                                    XR lumbar spine 6V w bending  
on 2023   
   
                                                    XR lumbar spine 6V   
w bending                               [facility] [address] [address]  
  
XRay Report  
Signed  
  
Patient:   
Natanael Gandhi   
MR#: [id number]  
[id number]  
: 1980   
Acct:[id number]  
  
Age/Sex: 43 / M ADM   
Date: 23  
  
Loc: XD Room: Type:   
[facility]  
Attending Dr:   
Kary AMBROSE  
Copies to: ARNOL Dougherty  
  
  
  
Ordering Provider:   
ARNOL Dougherty  
Date of Service:   
23  
Accession #:   
([id number]) XR/XR   
lumbar spine 6V w   
bending: M54.16  
  
  
  
  
XR lumbar spine 6V w   
bending 2023   
11:49 AM  
  
SIGNS AND SYMPTOMS:   
Leg pain  
  
PROTOCOLS: Frontal,   
lateral, and   
flexion-extension   
views of the lumbar   
spine.  
  
COMPARISON: None  
  
FINDINGS:  
  
There is 8   
millimeters of   
anterolisthesis of   
L4 upon L5 with   
moderate disc height   
loss. Flexion and  
extension view show   
no pathologic   
movement. There is   
mild disc height   
loss at T11-T12   
T12-L1, L2-L3,  
and L3-L4. Facet   
degenerative changes   
are present   
throughout. This is   
greatest at L4-5.  
  
The sacrum and   
sacroiliac joints   
are normal.  
  
Atherosclerotic   
changes are present   
in the abdominal   
aorta.  
  
  
ORDER #: [id number]   
XR/XR lumbar spine   
6V w bending  
IMPRESSION:  
  
There is 8   
millimeters of   
anterolisthesis of   
L4 upon L5 with   
moderate disc height   
loss.  
  
No pathologic   
movement on flexion   
or extension.  
  
Multilevel   
degenerative changes   
noted, as above.  
  
Impression dictated   
by: Lasha Guerrero M.D.2023 3:17   
PM  
  
  
Dictation Location:   
[place] [address]  
  
  
  
Transcribed By: [facility]   
23 151  
Dictated By: Lasha Guerrero II, MD   
23 1514  
  
Signed By:  
23 151       [facility]  
   
                                                    XR lumbar spine 6V   
w bending                               [facility]   
Professional   
[facility]por[facility]  
Other Phone:   
[phone]  
   
                                                    XR lumbar spine 6V   
w bending       [facility]   
Professional   
Corporation  
Other Phone:   
[phone]  
   
                                                    XR lumbar spine 6V   
w bending       [address] [place] [address]   
Professional   
Corporation  
Other Phone:   
[phone]  
   
                                                    XR lumbar spine 6V   
[place] [address]                                 [facility]   
Professional   
Corporation  
Other Phone:   
[phone]  
   
                                                    XR lumbar spine 6V   
w bending       XRay Report                                     [facility]   
[facility]   
[facility]  
Other Phone:   
[phone]  
   
                                                    XR lumbar spine 6V   
w bending       Signed                                          [facility]   
[facility]   
[facility]  
Other Phone:   
[phone]  
   
                                                    XR lumbar spine 6V   
w bending                               Patient:   
Natanael Gandhi   
MR#: [id number]                                                   [facility]   
[facility]  
Other Phone:   
[phone]  
   
                                                    XR lumbar spine 6V   
w bending       [id number]                                          [facility]   
[facility]   
[facility]  
Other Phone:   
[phone]  
   
                                                    XR lumbar spine 6V   
w bending                               : 1980   
Acct:[id number]                                             [facility]   
[facility]   
[facility]  
Other Phone:   
[phone]  
   
                                                    XR lumbar spine 6V   
w bending                               Age/Sex: 43 / M ADM   
Date: 23                                              [facility]   
[facility]  
Other Phone:   
[phone]  
   
                                                    XR lumbar spine 6V   
w bending                               Loc: XD Room: Type:   
REG CLI                                                     [facility]   
[facility]  
Other Phone:   
[phone]  
   
                                                    XR lumbar spine 6V   
w bending                               Attending Dr:   
Kary AMBROSE                                                        [facility]   
[facility]  
Other Phone:   
[phone]  
   
                                                    XR lumbar spine 6V   
w bending                               Copies to: ARNOL Dougherty                                              [facility]   
[facility]  
Other Phone:   
[phone]  
   
                                                    XR lumbar spine 6V   
w bending                               Ordering Provider:   
ARNOL Dougherty                                                        [facility]   
[facility]  
Other Phone:   
[phone]  
   
                                                    XR lumbar spine 6V   
w bending                               Date of Service:   
23                                                    [facility]   
[facility]  
Other Phone:   
[phone]  
   
                                                    XR lumbar spine 6V   
w bending                               Accession #:   
([id number]) XR/XR   
lumbar spine 6V w   
bending: M54.16                                             [facility]   
[facility]  
Other Phone:   
[phone]  
   
                                                    XR lumbar spine 6V   
w bending                               XR lumbar spine 6V w   
bending 2023   
11:49 AM                                                    [facility]   
[facility]  
Other Phone:   
[phone]  
   
                                                    XR lumbar spine 6V   
w bending                               SIGNS AND SYMPTOMS:   
Leg pain                                                    [facility]   
[facility]  
Other Phone:   
[phone]  
   
                                                    XR lumbar spine 6V   
w bending                               PROTOCOLS: Frontal,   
lateral, and   
flexion-extension   
views of the lumbar   
spine.                                                      [facility]   
[facility]  
Other Phone:   
[phone]  
   
                                                    XR lumbar spine 6V   
w bending       COMPARISON: None                                 [facility]   
[facility]  
Other Phone:   
[phone]  
   
                                                    XR lumbar spine 6V   
w bending       FINDINGS:                                       [facility]   
[facility]  
Other Phone:   
[phone]  
   
                                                    XR lumbar spine 6V   
w bending                               There is 8   
millimeters of   
anterolisthesis of   
L4 upon L5 with   
moderate disc height   
loss. Flexion and                                           [facility]   
[facility]  
Other Phone:   
[phone]  
   
                                                    XR lumbar spine 6V   
w bending                               extension view show   
no pathologic   
movement. There is   
mild disc height   
loss at T11-T12   
T12-L1, L2-L3,                                              [facility]   
[facility]  
Other Phone:   
[phone]  
   
                                                    XR lumbar spine 6V   
w bending                               and L3-L4. Facet   
degenerative changes   
are present   
throughout. This is   
greatest at L4-5.                                           [facility]   
[facility]  
Other Phone:   
[phone]  
   
                                                    XR lumbar spine 6V   
w bending                               The sacrum and   
sacroiliac joints   
are normal.                                                 [facility]   
[facility]  
Other Phone:   
[phone]  
   
                                                    XR lumbar spine 6V   
w bending                               Atherosclerotic   
changes are present   
in the abdominal   
aorta.                                                      [facility]   
[facility]  
Other Phone:   
[phone]  
   
                                                    XR lumbar spine 6V   
w bending                               ORDER #: [id number]   
XR/XR lumbar spine   
6V w bending                                                [facility]   
[facility]  
Other Phone:   
[phone]  
   
                                                    XR lumbar spine 6V   
w bending       IMPRESSION:                                     [facility]   
[facility]  
Other Phone:   
[phone]  
   
                                                    XR lumbar spine 6V   
w bending                               There is 8   
millimeters of   
anterolisthesis of   
L4 upon L5 with   
moderate disc height   
loss.                                                       [facility]   
[facility]  
Other Phone:   
[phone]  
   
                                                    XR lumbar spine 6V   
w bending                               No pathologic   
movement on flexion   
or extension.                                               [facility]   
[facility]  
Other Phone:   
[phone]  
   
                                                    XR lumbar spine 6V   
w bending                               Multilevel   
degenerative changes   
noted, as above.                                            [facility]   
[facility]  
Other Phone:   
[phone]  
   
                                                    XR lumbar spine 6V   
w bending                               Impression dictated   
by: Lasha Guerrero M.D.2023 3:17   
PM                                                          [facility]   
[facility]  
Other Phone:   
[phone]  
   
                                                    XR lumbar spine 6V   
w bending                               Dictation Location:   
[place] [address]                                                 [facility]   
[facility]  
Other Phone:   
[phone]  
   
                                                    XR lumbar spine 6V   
w bending                               Transcribed By: PWS   
23 1517                                               [facility]   
[facility]  
Other Phone:   
[phone]  
   
                                                    XR lumbar spine 6V   
w bending                               Dictated By: Lasha Guerrero II, MD   
23 0116                                               [facility]   
[facility]  
Other Phone:   
[phone]  
   
                                                    XR lumbar spine 6V   
w bending       Signed By:                                      [facility]   
[facility]  
Other Phone:   
[phone]  
   
                                                    XR lumbar spine 6V   
w bending       23 6163                                   [facility]   
[facility]  
Other Phone:   
[phone]  
   
                                                    MR LUMBAR SPINE WO CONTRASTo  
n 2023   
   
                                                    MR LUMBAR SPINE WO   
CONTRAST                                EXAM: MR LUMBAR   
SPINE WO CONTRAST  
DATE:2023 2:30   
PM  
CLINICAL HISTORY:   
low back pain, leg   
weakness.  
COMPARISON: None   
available.  
TECHNIQUE:   
Multiplanar MR   
imaging of the   
lumbar spine was   
performed without   
contrast.  
FINDINGS:  
The spine is   
visualized from   
T11-12 through the   
S2, on the   
diagnostic sagittal   
sequences.  
Alignment: Lumbar   
lordosis is   
maintained.   
Approximately 5 mm   
of anterolisthesis   
of L4 over L5.   
Vertebral body   
heights and   
remaining alignment   
are within normal   
limits.  
Bone marrow   
signal/fracture:   
Mild bone marrow   
edema lower facet   
joints. Otherwise,   
unremarkable.  
Conus: The conus is   
within normal limits   
of signal intensity   
and morphology.  
Paraspinal soft   
tissues: Paraspinal   
soft tissues are   
within normal   
limits.  
Lower thoracic   
spine: Visualized   
lower thoracic canal   
and foramina are   
without significant   
narrowing.  
L1-2: Unremarkable.  
L2-3: Mild disc   
space narrowing,   
mild diffuse disc   
bulging, and mild   
hypertrophic facet   
and ligamentum   
flavum changes, with   
borderline central   
spinal stenosis and   
neural foraminal   
narrowing.  
L3-4: Mild to   
moderate disc space   
narrowing, mild   
diffuse disc   
bulging, and mild to   
moderate   
hypertrophic facet   
and ligamentum   
flavum changes, with   
mild central spinal   
stenosis and neural   
foraminal narrowing.  
L4-5: Grade 1   
anterolisthesis,   
mild disc space   
narrowing, moderate   
diffuse disc   
bulging, and marked   
hypertrophic facet   
changes, which   
results in moderate   
to marked central   
spinal stenosis,   
lateral recess and   
neural foraminal   
narrowing, greater   
on the right.  
L5-1: Mild diffuse   
disc bulging and   
mild to moderate   
hypertrophic facet   
and ligamentum   
flavum changes,   
without significant   
central spinal   
stenosis or neural   
foraminal narrowing.  
Sacrum and iliac   
wings: Unremarkable.  
IMPRESSION:  
LUMBAR SPONDYLOSIS   
AND DEGENERATIVE   
DISC DISEASE,   
PREDOMINANTLY L4-5.  
ELECTRONICALLY   
SIGNED BY: Edilberto Martinez MD          Normal                                  Not Available  
   
                                                    Quick Strepon 2023   
   
                                                    S. pyogenes Org   
specific cx Ql   
(Throat)        Negative                                        [facility]   
[facility]  
Other Phone:   
[phone]  
   
                      Quick Strep                                  [facility]   
[facility]  
Other Phone:   
[phone]  
   
                                                    SARS-CoV-2 (COVID-19) RNA NA  
A+probe Ql (Resp)on 2023   
   
                                                    SARS-CoV-2   
(COVID-19) RNA   
ALEX+probe Ql (Unsp   
spec)           Negative                                        [facility]  
Other Phone:   
[phone]  
   
                                                    XR FINGER MIN 2 VIEWSon    
   
                                                    XR FINGER MIN 2   
VIEWS                                   XR FINGER MIN 2   
VIEWS, 3/19/2023   
2:55 PM EDT,   
INDICATION:  
Swelling of finger  
COMPARISON:  
None available at   
the time of   
dictation.  
TECHNIQUE:  
Three views with   
attention to the   
left finger are   
reviewed.  
FINDINGS:  
Alignment is   
maintained.  
No fracture is   
identified.  
No other acute   
process is seen.  
IMPRESSION:  
No definite fracture   
is seen. No gas or   
radiopaque foreign   
body seen within the  
soft tissue.  
Electronically   
authenticated by:   
SADAF LIM Date:   
2023 15:44    Normal                                  [facility]  
   
                                                    Office Visit (Cardiology)on   
2023   
   
                                        Follow-up visit     Diagnoses/Problems  
Assessed  
Palpitations (785.1)   
(R00.2)  
Paroxysmal atrial   
fibrillation   
(427.31) (I48.0)  
Shortness of breath   
(786.05) (R06.02)  
Sinus bradycardia   
(427.89) (R00.1)  
Sleep apnea (780.57)   
(G47.30)  
Class 1 obesity with   
body mass index   
(BMI) of 31.0 to   
31.9 in adult   
(278.00,V85.31)  
(E66.9,Z68.31)  
Current smoker on   
some days (305.1)   
(F17.200)  
1 PPD  
Hypertriglyceridemia   
(272.1) (E78.1)  
Seizure disorder   
(345.90) (G40.909)  
Orders  
Class 1 obesity with   
body mass index   
(BMI) of 31.0 to   
31.9 in adult  
Healthy Weight Tips;   
Status:Complete -   
Retrospective   
Authorization; Done:   
[id number]  
Some eating tips   
that can help you   
lose weight.;   
Status:Complete -   
Retrospective  
Authorization; Done:   
[id number]  
Hypertriglyceridemia  
Renew: Simvastatin   
20 MG Oral Tablet;   
TAKE ONE TABLET BY   
MOUTH ONCE DAILY IN  
THE EVENING  
Paroxysmal atrial   
fibrillation  
Renew: Aspirin EC 81   
MG Oral Tablet   
Delayed Release;   
TAKE 1 TABLET DAILY  
SocHx: Current   
smoker on some days  
You need to stop   
smoking. Though it   
is not easy, more   
than half of all   
adult smokers  
have quit. We   
encourage you to   
write down all the   
reasons you should   
quit smoking and  
set a quit date for   
yourself. Ask us how   
we can help. You may   
also call  
0-800-QUIT-NOW for   
free resources and   
assistance.;   
Status:Complete -   
Retrospective  
Authorization; Done:   
[id number]  
Tobacco Use   
Screening;   
Status:Complete;   
Done: [id number]  
Patient Instructions  
Please bring all   
medicines, vitamins,   
and herbal   
supplements with you   
when you come to the   
office.  
Prescriptions will   
not be filled unless   
you are compliant   
with your follow up   
appointments or have   
a follow up   
appointment   
scheduled as per   
instruction of your   
physician. Refills   
should be requested   
at the time of your   
visit.  
Follow up in 1 year.  
Retrieve lab work  
  
  
Chief Complaint  
NATANAEL GANDHI is   
being seen for an   
annual follow-up of.  
  
History of Present   
Illness  
Patient is here for   
follow-up continue   
management for   
previous evaluation   
for palpitation and   
1 episode of atrial   
fibrillation,   
hyperlipidemia and   
previous complaint   
of shortness of   
breath. Since last   
time I saw him he   
reported only 1   
brief episode of   
palpitation lasting   
less than a few   
minutes. He denies   
lightheadedness,   
dizziness or   
syncope. He   
underwent sleep   
evaluation and the   
result noted. He   
reports he is trying   
to cut down on his   
smoking and will be   
involved in smoking   
cessation program.   
Laboratory data done   
at his family   
physician. He has   
lost close to 15   
pounds.  
Assessment  
1. Palpitation . His   
event monitor   
despite complaint of   
palpitation failed   
to demonstrate any   
significant   
arrhythmia. Patient   
mother indicated   
that he does have an   
element of anxiety.   
And documentation of   
recurrence atrial   
fibrillation  
2. Prior history of   
one episode of   
atrial fibrillation   
with low chads   
vascular score. He   
is on aspirin   
therapy  
3. Obesity recent   
weight loss close to   
15 pounds  
4. Hyperlipidemia on   
treatment  
5. Shortness of   
breath related to   
prior tobacco use.   
He reported   
improvement since he   
lost the extra   
weight  
6. Tobacco use  
7. Sleep evaluation   
noted patient   
continue to follow   
with pulmonary/sleep   
clinic  
Plan  
1. Patient was   
counseled regarding   
risk factor   
modification  
2. Patient was   
advised to remain on   
an aspirin 81 mg   
once daily  
3. Patient was   
counseled regarding   
losing weight,   
exercise and dietary   
modification  
4. We reviewed the   
results of his sleep   
evaluation  
5. I do not find me   
if he develop any   
recurrence of his   
symptoms of   
palpitation or chest   
pain  
6. I suggested 6   
month follow-up and   
I advised the   
patient to try to   
lose 10 pounds by   
next time  
7. We will see him   
in 1 year in   
follow-up or earlier   
if the need arise  
  
Current Meds  
  
Medication   
NameInstruction  
Aspirin EC 81 MG   
Oral Tablet Delayed   
ReleaseTAKE 1 TABLET   
DAILY.  
Ibuprofen 100 MG/5ML   
Oral SuspensionTAKE   
10 ML 4 times daily   
PRN  
levETIRAcetam 100   
MG/ML Oral   
SolutionTAKE 7.5 ML   
TWICE DAILY  
Simvastatin 20 MG   
Oral TabletTAKE ONE   
TABLET BY MOUTH ONCE   
DAILY IN THE EVENING  
Allergies  
Medication  
Benadryl TABS  
Allergy;   
Hypertension;;   
Recorded By:   
Sandra Felix;   
2021 8:19:32   
PM  
Topiramate TABS  
Adverse Reaction;   
Depression;;   
Recorded By: Nacho Cr; 2023   
1:00:26 PM  
predniSONE  
Adverse Reaction;   
Recorded By: Nacho Cr; 2023   
1:00:26 PM  
Social History  
Problems  
Caffeine use   
(V49.89) (Z78.9)  
1 cup-2 cups coffee   
in AM, 2-3 POPS A   
DAY  
Current smoker on   
some days (305.1)   
(F17.200)  
1 PPD  
No alcohol use  
No illicit drug use  
Review of Systems  
Constitutional: not   
feeling tired.  
Cardiovascular: no   
intermittent leg   
claudication and as   
noted in HPI.  
Respiratory: no   
cough and no   
shortness of breath.  
Gastrointestinal: no   
change in bowel   
habits and no blood   
in stools.  
Integumentary: no   
skin rashes.  
Neurological: no   
seizures and no   
frequent falls.  
All other systems   
have been reviewed   
and are negative for   
complaint.  
  
Vitals  
Vital Signs  
Recorded: [id number]   
01:05PM (more   
content not   
included)...        Normal                                  [facility] [place]  
   
                                                    Covid-19 PCR (CVDTBH)on    
   
                                                    SARS-CoV-2   
(COVID-19) RNA   
ALEX+probe Ql (Unsp   
spec)           Not detected    Normal          NOT DETECTED    [facility]  
   
                                        Comment on above:   Result Comment: This  
 test is not yet approved or cleared by   
the   
[place] FDA. When there are no FDA-approved or cleared tests   
available, and other criteria are met, FDA can make tests available   
under an emergency access mechanism called an Emergency Use   
Authorization (EUA). The EUA for this test is supported by the   
 of Health and Human Service's (HHS's) declaration that   
circumstances exist to justify the emergency use of in vitro   
diagnostics for the detection and/or diagnosis of the virus that   
causes COVID-19. This EUA will remain in effect (meaning this test   
can be used) for the duration of the COVID-19 declaration   
justifying emergency of IVDs, unless it is terminated or revoked by   
FDA (after which the test may no longer be used).  
When diagnostic testing is negative, the possibility of a false   
negative should be considered in  
the context of a patient's recent exposures and the presence of   
clinical signs and symptoms  
consistent with SARS-CoV-2.   
   
                                                            Performed By: #### C  
VDTB ####  
[facility]  
[address] [place] [address]  
Dr. Joon Núñez   
   
                                                    INFLUENZA A AND B AGon    
   
                      INFLUBNEG SEE BELOW  Normal                [facility]  
   
                                        Comment on above:   Result Comment: Nega  
tive for Flu B protein antigen. Infection   
due   
to Flu B cannot be ruled out. Flu B antigen in the sample may be   
below the detection limit of the test.   
   
                                                            Performed By: #### I  
NFLUAB ####  
[facility]  
[address] [place] [address]  
Dr. Joon Núñez   
   
                          INFLUENZA A AG Positive     Abnormal     NEGATIVE SEE   
COMMENT                                 [facility]  
   
                                        Comment on above:   Performed By: #### I  
NFLUAB ####  
[facility]  
[address] [place] [address]  
Dr. Joon Núñez   
   
                          INFLUENZA B AG Negative     Normal       NEGATIVE SEE   
COMMENT                                 [facility]  
   
                                        Comment on above:   Performed By: #### I  
NFLUAB ####  
[facility]  
[address] [place] [address]  
Dr. Joon Núñez   
   
                      INFLUPOSH  SEE BELOW  Normal                [facility]  
   
                                        Comment on above:   Result Comment: NOTE  
: Live attenuated influenzae vaccine   
viruses   
can cause a positive result for a rapid influenza diagnostic test   
if administered up to 7 days prior to rapid testing.   
   
                                                            Performed By: #### I  
NFLUAB ####  
[facility]  
[address] [place] [address]  
Dr. Joon Núñez   
   
                          INTERNAL CONTROLS Within Normal Limits Normal       Wi  
thin Normal   
Limits                                  [facility]  
   
                                        Comment on above:   Performed By: #### I  
NFLUAB ####  
[facility]  
[address] [place], [place] [address]  
Dr. Joon Núñez   
   
                                                    XR Wrist Complete Right*on 0  
2022   
   
                                                    XR Wrist Complete   
Right*                                  HISTORY: Wrist pain  
  
COMPARISON: None   
available  
  
TECHNIQUE: AP,   
lateral, oblique and   
scaphoid views of   
the wrist obtained.  
  
FINDINGS:  
  
No acute fracture or   
dislocation. Carpal   
and radiocarpal   
alignment is   
satisfactory.  
  
Negative ulnar   
variance. There is a   
7 mm area of   
sclerosis within the   
lunate that  
is nonspecific. Soft   
tissues are within   
normal limits.  
  
IMPRESSION:  
  
No acute fracture.  
  
Nonspecific 7 mm   
area of sclerosis   
within the lunate   
may represent a bone   
island  
or osteonecrosis.   
Follow-up MRI of the   
wrist without   
contrast is   
recommended to  
further evaluate.  
  
  
Report reported and   
signed by BREONNA HOLT on 2022   
1120                Normal                                  [facility]   
Medical   
Specialist  
   
                                                    XR SHOULDER RT 2V or >on    
   
                                                    XR SHOULDER RT 2V   
or >                                    EXAM: XR SHOULDER RT   
2V or >  
HISTORY: Acute pain   
due to injury  
COMPARISON: None.  
TECHNIQUE: 3 views   
were obtained of the   
right shoulder.  
FINDINGS:  
A fracture is not   
identified.  
A cortical   
irregularity is not   
detected.  
The glenohumeral   
joint appears   
normal.  
The AC joint appears   
normal.  
A foreign body is   
not identified.  
The visualized   
portion of the lung   
is clear.  
A rib fracture is   
not identified.  
A foreign body is   
not preserved. There   
is no significant   
soft tissue   
swelling.  
IMPRESSION:  
1. Normal right   
shoulder.  
Electronically   
authenticated by:   
TORI STRINGER Date:   
2022 17:23    Normal                                  [facility]  
   
                                                    XR FOOT DURAN MIN 3 VIEWSon    
   
                                                    XR FOOT DURAN MIN 3   
VIEWS                                   EXAMINATION: XR   
ANKLE DURAN MIN 3   
VIEWS, XR FOOT DURAN   
MIN 3 VIEWS  
HISTORY: Bilateral   
ankle joint pain  
COMPARISON: No   
relevant comparison   
available.  
FINDINGS:  
RIGHT FINDINGS:  
BONES: No   
significant   
arthropathy or acute   
abnormality.   
Incidental ununited  
ossification along   
the proximal dorsal   
margin of the talus.  
SOFT TISSUES: No   
visible soft tissue   
swelling.  
OTHER: Negative.  
LEFT FINDINGS:  
BONES: No   
significant   
arthropathy or acute   
abnormality.   
Incidental ununited  
ossification along   
the proximal dorsal   
margin of the talus.  
SOFT TISSUES: No   
visible soft tissue   
swelling.  
OTHER: Negative.  
IMPRESSION:  
RIGHT CONCLUSION: No   
acute bone   
abnormality,   
significant   
degenerative joint  
disease, or   
suspicious findings.  
LEFT CONCLUSION: No   
acute bone   
abnormality,   
significant   
degenerative joint  
disease, or   
suspicious findings.  
  
Electronically   
authenticated by:   
EDILBERTO STROUD Date:   
2022 16:24    Normal                                  [facility]  
   
                                                    Auth for Release of Medical   
Recordson 2022   
   
                                                    Auth for Release   
of Medical Records                      [id number]  
[id number]  
#1.00CD:127         [facility]  
   
                                                    Tobacco Screening.on   
022   
   
                                                    Tobacco use status   
CP            a) Yes                                          -[facility]   
[facility]   
[address]  
Work Phone:   
[phone]  
   
                      Tobacco Screening. Yes                              [facility]   
[facility]   
[address] DO  
Work Phone:   
[phone]  
   
                                                    Tobacco use status   
CP            b) No                                           -[facility]   
[facility]   
250 DO  
Work Phone:   
[phone]  
   
                                                    Pre-Certification Formon    
   
                                                    Pre-Certification   
Form                                    [id number]  
[id number]  
#1.00CD:127         Normal                                  [facility]  
   
                                                    Pathology Noteon 2021   
   
                                        Pathology Note      [id number]  
[id number]  
1#1.00CD:127        [facility]  
   
                                        Comment on above:   Result Comment: Elec  
tronically Signed By: Can Phelps MD, Branden LENZ\.br\Date and Time Signed: 06/15/21 09:12 EDT   
   
                                                    Reference Lab Reporton    
   
                                                    Reference Lab   
Report                                  [id number]  
[id number]  
8#1.00CD:127        [facility]  
   
                                                    Reminderson 2021   
   
                                        Reminders           --------------------  
-  
From: Sangita Blackwell  
To: EU - Clinical;  
Sent: 2021   
10:44:55 EDT Show   
up: 2021   
10:44:00 EDT  
Subject: CT/ Semen   
analysis  
Reminder/Recall  
CT a/p being done at   
[facility], faxed order for   
precert and to be   
scheduled  
Semen analysis being   
done  or [facility]  
Fish/cytology done   
21  
--------------------  
-  
From: Clara Corley MA (EU -   
Clinical)  
To: Rishi Sangita SMITH;  
Sent: 2021   
16:02:02 EDT Show   
up: 2021   
16:01:00 EDT  
Subject: RE: CT/   
Semen analysis  
Natanael was   
scheduled for a CT   
at [facility] on 21   
and he No showed his   
appt. What do we do   
now?                Normal                                  [facility]  
   
                                        Reminders           --------------------  
-  
From: Clara Corley MA  
To: EU - Clinical;  
Sent: 2021   
14:59:50 EDT Show   
up: 2021   
14:59:00 EDT  
Subject:   
FIsh/Cytology  
  
Reminder/Recall  
  
  
Fish/Cytology done   
through [facility]  
Fish/Cytology is   
negative.           Normal                                  [facility]  
   
                                                    Lab Miscellaneous-LCon    
   
                                Lab Miscellaneous COMMENT         Invalid   
Interpretation   
Code                                                [facility]  
   
                                        Comment on above:   Result Comment: Test  
 Ordered: 604163 Bladder Cancer FISH, MD   
Review  
Bladder Cancer FISH Findings: Comment 4_  
Negative UroVysion Result  
Fluorescence in situ hybridization (FISH) of cells  
recovered from urine was performed using the [employer] UroVysion  
Kit.  
A minimum of twenty-five cells was examined, and an  
abnormal signal pattern was not detected, indicating a  
NEGATIVE result.  
The performance characteristics of this test have been  
validated by [employer]. A positive result is the  
detection of four or more cells with greater than two  
signals for at least two chromosomes (3, and/or 7, and/or  
17) and/or twelve or more cells with no signal for  
chromosome 9. Probes: 3cen(D3Z1), 7cen(D7Z1), 9p21(p16),  
17cen(D17Z1)  
Specimen Type: Comment 4_  
Unspecified Collection Method  
Specimen Description: Comment 4_  
Received is 60ml of yellow, clear, preserved urine.  
Electronically signed: Comment 4_  
Alyson Rodas M.D.  
Clinical Data: Comment 4_  
No clinical data specified  
CPT Codes: Comment 4_  
[address]  
Performed at: [address] [id number]  
[id number] PhD Jimena Cantu   
   
                                                            Performed By: #### 1  
[id number] ####  
[facility]  
[address] [place] [address] [place]  
[place] [address]   
   
                                                    Coding Summary.on 2021  
   
   
                                        Coding Summary.     [id number]  
Gh0bWw+PGhlYWQ+PE1FV  
ABxB13kdTBocM5ER1nXV  
S8LPKSXNYKAEG0SMI5fq  
CE9WHoqG9HymkOz  
NkdauGFdST43JHz3XGT5  
qDttNJesnT2baUPrJ3p4  
RcInAP91dT81ORbjAUQu  
TuP2UtHqfzhaeCFq  
T2wcDvIwyNRoTnn+PHRh  
YmxlIHdpZHRoPScxMDAl  
PtHqoOtqNS0uDe6cXNPd  
LWNvbGxhcHNlOiBj  
z4yzWFNdJQtaMG1fvFgf  
D2NxeEV1CDCxs1q6Zj45  
dHI+ZUHyGIV0aKjvPDwz  
y219DiSoi0ogNFF2  
lOSqHAuhDCC1T56dl8P7  
CMZcAFEzWRN6cCK2oZ3v  
jEotbanoD5MmoRDgJrF3  
ELA3mFGcgL9jmLlm  
bmsicX7aKvj+N90VCL2M  
BGVEGT6BFjd1N6BfIpjp  
dHI+CM13SWHtOE11sIUa  
iFTyv2lhyXh4BqDr  
WJVmFEH5uWbcAIzkd8Si  
VGQsM37syCHkv8V8GCPw  
vZhycKMtEqAnjLO5wL1d  
MXgromhdn5asysiu  
Hucmc7pjhn31gA45V01c  
VHycZXKjYCJ9WFKeEZPa  
cLcssa2jgQ9mZe5+IDxj  
u0bfb7zjhAi2MlId  
HFGzdhQlbSgoVMX9p9Ql  
Yc42F3YtnObzo6BbLtr4  
tw48jQJhb9X6mAU1GRqb  
VPNpyW1sKOeyGyJ7  
IHJnBzCxbD62rAPdGTuj  
Rb3ipTymfIbfVT2bPMSh  
ezcsNGPgkV3gFIFioGEl  
vGcdGD3jWIHeokux  
d317MqGoFDP7UDQhdYPh  
E8WppX5cRaOaZLOxMSOw  
O6DlsDUkPOwcP797OAez  
EeC2EVTwbhCzT8Ty  
XTQhsLkiQaX9c2T8Hc8Q  
b4VjimmaFGV3SIxtSMU2  
OxFbWfCiLnN6J5MtImw5  
QPDknJxbXT1fF4Xh  
VVWtcyvsknlgeLF0YKYt  
TZDygM95aQAoYWqjSm5x  
o4H6s410JVGuRWApzE45  
Hi1daIzxOTWecERW  
lY9jkoyur2agxthjZnPl  
ROEyGOi9BNd7OJOgyJoy  
OeXxKSP0OgC5HUS0lPWw  
gH9vbZsptypyrG7w  
Oyc+E52xyW5fQRV6GSJ0  
stmgGFCcswYvPC22SU91  
G7HaXnesfCGtaGL+PGRp  
eiNhiAvtJB4zVoRa  
a0mcq1IuBVebM7AdBYEq  
MIpqIez7TJDkTKP7nLO6  
zB5nSPLaTWiyb9C7cES7  
L5XpliKscn6nj6aw  
JGRpJVzeH83qjBYyg7O9  
URPcnAT3TBDrjIvcXtHr  
yB08Tmr+JNClcIklf9Me  
Qnrgr6bvw8lbmLa5  
IjMwJSIgdmFsaWduPSJ0  
m6OuRf25U48wEIxlJGOy  
VCKpNXDoBTIdfSkzue1s  
tU2qWe5+PGNvbCB3  
aJW9wA1eYHFuOrA4WMmq  
J987RaZcaVRcHyoru3vd  
c7vdhTk5DfLtUBJdsxDm  
eOhxCTV6a8WnFh61  
L22dRNvaGSQkHFWpARJr  
SUWkgJryez5oaF3xOj0+  
XI7eu7eeru45xY71jJM+  
XHYeOSJ0lXflVHwk  
HHCndU9iXOiwDeW3AMVh  
WlCowG00kKWiQJnzKn8s  
yUagpLuwLS1yIHTbbuzd  
c747UcMmg0nnCUXu  
mQOaXHpdZMP0H09ev2K2  
JVLwTSVsJPY8rOS1eH7j  
bGlnbjogbGVmdDsgdmVy  
sBrcFZdrIDxmY480  
IHRvcDsnPlBhdGllbnQg  
XqAmXQs0J5TqOmh9KAYh  
xDejRD8bxPJzIGrbXs9g  
wAamcJztEI6aJIVv  
pguds201QiWqf1acFABw  
eZUbNQrhQPB7A87sm0E2  
WCSoNYTyZKT5dPH2uL1u  
bGlnbjogbGVmdDsg  
sbCvsZofSZogREqxM956  
IHRvcDsnPkJpcnRoIERh  
jOH7HE68PJ89tYWxn5L7  
dTM1G5DrXRLzabdo  
guusdVF8EKEhGBOhyC63  
Nq4zfChxYo2iKWQfQGZ3  
EIAmgCDyT3YpnZ1fPwXk  
KAKpSTSeP2WsyZQh  
CXxaM829LWzxYcO4GCNy  
heOtO6ZwODLwaDkrHfJ5  
u3W5Gl1DD5E7NH55BQ05  
lCCms9H8hEI3W1Nw  
BGSacefvnheknPS5AIOj  
HNJxrI92Xk6avOdxZl8m  
EOYwCQI4HEVxjOKqI5Uq  
qP9aBbTsJHOoTKXo  
Z2EngWUlOUwlA090XSan  
RjN9NSZdccWmW6RdJTNw  
cDqeTzV6k4F8Ka7MLBz2  
ME13ZQ39pVGcz2S9  
bXW4M1LkLUHfmheeednl  
tQK6SAPqZNEeqS26Tj0p  
iKodTh2tNFGpFYI0LZXc  
zDKjG6UexJ2xBfDk  
IVKtLBPiU7XgdLReNCyw  
T087JKbhVvX4KWDwzsVh  
N1EtPJDoaIdfYcB3v1F2  
Ul8VIFVuJI20IYP7  
cUX8OB64KT10F4ZlJhum  
dGFibGU+PHRhYmxlIHdp  
ZHRoPScxMDAlJyBzdHls  
BT7nPc3bWNWtKGNd  
fTwlnKRiYzYck3fnTZQf  
KWfbSG1cyJmbJ4MdrLO2  
QESgq8i5Mt58Q59lH1Gs  
dXA+HFDsjGZ5zYZ5  
gV4lEfRrZyV4NGhfN708  
MeSgtHZcUiwht4rwl8xo  
yJn4VxA0NIOktqYvvEtq  
LYB8l8ElPp15Y70y  
IHdpZHRoPSIxNSUiIHZh  
qPvgpx2mdB4dZa2+PGNv  
gTF2tKX4jL9hYzMrPaB3  
WAatU336DqEahQKu  
Aqrnx6svq1qfsEi5SyDe  
IDBrqqXdmZsgXWX3h8Ob  
Le24K0CqnHjwd2HaHzd5  
aj57kFZzm9U0sMR3  
W9GvJLXjhpxzeNBxgWzw  
BE6qJBTwrdhbRPJgjS3f  
BBWpM3e6YqLpVsG0GCtd  
O8BokmL4QHLrjARl  
CRnwNQK4C35eh2Q0AQXw  
PWFyYZM8lKT2yN9lnSyj  
bjogbGVmdDsgdmVydGlj  
EVexCYrkV403JUNw  
kTmvBEHltH1sYTRjjSSr  
zVhxRL9sRROciqzcQeWV  
A8CfNWXAFZJBZXFVPRWL  
SJM8Y3MuCfn6GNZn  
iYmqOC0pwLCeVUslEm3p  
eMpxeEnkXD3lFDWfdbur  
QLSqoP3nBKSpsJTiyVym  
IN6xABVzeicak937  
UqMcRDR4BLXdkHOwN6Sh  
aZ5gPeGsQWQyMFHgA3Te  
nYLuPTxtK117PGelEbW0  
TYPafqQtW2OiAZEt  
wCjxAyE6c5Q6St9yAP6l  
Ht9vEQg9AF17FG68cETy  
z8R0kZO5J6GoAJYsbcjw  
hpshwLA5EGIdZQJm  
zA51xVGiSPscVe4ta0L2  
f640PDRnLDBleT29Om1a  
kOzpRZMowEVRiX4jxcxl  
a2zvwqauKrNyWIMd  
GPe8ULd6QSLukRbvGoZo  
UUN5AaK7PMY1yOWbpT4r  
qAlnjzykqL4fPgj+NDIg  
THZweeD1V4KcTjl3  
HXCceMcaAC1njKEwFAwk  
Aj6fkBjmoRioVH0jDBUr  
fzagTUGllI3xSKKgcEJu  
aIlnVY8oNVGsynvl  
l886VgVdTHV0WWIjeNPv  
X8IqmO8aFgMzHOTvYABg  
R9CiuFPgBRhyZ020FBtj  
TlE3NXWrczMwF7Gi  
SYUpqJlqPfI8p8S9Yb3Y  
NQbyCY73EG15kQGpf6A5  
pET8S3NqBRBxgehgbswq  
nZS7WVPpPGHlnA45  
oIMoWSpdNv5eo1K6l000  
JORlGRUtnN13Fl2frHio  
CNMzpLNKhT0cvzskt2uu  
cjogIzAwMDAwMDt0  
JZa4KRExmBrzJiSgVQG4  
RyB5OWT2xWHzeL9ljFaj  
bzrteG8vHub+TGFiIERy  
r4Nkh5SaUK47RS42  
F3KiDlniwJUxkYI+PHRh  
YmxlIHdpZHRoPScxMDAl  
NpIuqZsoIF4aFg2aQSPx  
LWNvbGxhcHNlOiBj  
n6wzJGPaLHdbFY1riZha  
T6EuyUB0TAGdv4t0Qv37  
B55pO0AahNJ+PGNvbCB3  
oKM4eR6gHeLtBjK8  
KIpcM315NcYqcSGyJwse  
m3nud5fleYr6QlNcLUXy  
qzWzrMtzZQB1u3ThBj67  
N30iMWnrIBGsZWPn  
WOFyQAEpwTikhm1wnI4d  
Ii8+RKFfgLK4cQV4gD0o  
PdNbVuU2PVppX438IwBg  
fLEePnmiA76nW9Dc  
dXA+GJDpFzu2YQXdtRly  
BZ1fqXJcLVugYh4bQAV4  
XgEfWmYwFDkaJ7TpZEWr  
srzdzzykkJF3FYAo  
DTMzaJ64Hm1cjGtuLh7a  
QPYjSUH0DMQcySDrG6Rx  
hN6lXlEvHWBjMGGaT8Tn  
nGVcRNypE894NOor  
OjK2ZNQmriTpO7OuLGYl  
cEnmYrE4w9H2Dx9KqZoy  
wSYyBD0jHcFnXJy8V0Im  
Ogr0IAYvtLmvHE6b  
aZNhKCriNt5obOyslOnl  
HM9nKPUdzdmkm141TqRh  
m5fnXCDfrNHjQEyzLBM8  
Z35xz9P4QPMhIWLx  
KWJ1vDG3dV6eoGhtovzp  
bGVmdDsgdmVydGljYWwt  
RXfmF387WHAmeBovAfTJ  
Iqu7S4JtTtn1ELXp  
uXbdCB9keXIcBGxbMj1z  
nVcecCgxQY5kGFXqyoac  
b331MaYkb0hgCFGlkKZn  
VSfgPXL5O08ws1I1  
QNXwPJTcPVF9mVB8xE0e  
bGlnbjogbGVmdDsgdmVy  
lXxxKCjeLEvlV091KRLv  
dXwlJb8LQtk5S2Zd  
Mys1KPPqpLshUL5egXQk  
RChfFp6evKbyzGiiHT1e  
RRAjoqeqo928ZdBfq7am  
IDEwcHQgVGltZXM7  
D71iq1C6CYYvQKZzQFK6  
iXQ0pX8yvZvgevsacDYg  
dDsgdmVydGljYWwtYWxp  
J638VVTmrMtoAgEt  
eWVyOjwvdGQ+DF18cj34  
N9KxUszjQkk9PXQtBGB5  
jEQ2uR5mJFDaKOboy5H1  
kIZ7J1CzigRgno5t  
b2xs (more content   
not included)...    Normal                                  [facility]  
   
                                                    Ambulatory Clinical Summaryo  
n 2021   
   
                                                    Ambulatory   
Clinical Summary                        {[id number]  
p-[id number]-a6-wq-z2-53-  
2a-e9-63}[id number]  Normal                                  [facility]  
   
                                                    Lab Miscellaneous-LCon    
   
                                Test Code       [id number]          Invalid   
Interpretation   
Code                                                [facility]  
   
                                        Comment on above:   Performed By: #### 1  
[id number] ####  
[facility]  
[address] [place] [address]   
   
                                Test Name       FISH            Invalid   
Interpretation   
Code                                                [facility]  
   
                                        Comment on above:   Performed By: #### 1  
[id number] ####  
[facility]  
[address] [place] [address]   
   
                                                    Patient Educationon 20  
21   
   
                                        Patient Education   Urology  
Hematuria, Adult  
(Inserted Image.   
Unable to display)  
Hematuria is blood   
in the urine. Blood   
may be visible in   
the urine, or it may   
be identified with a   
test. This condition   
can be caused by   
infections of the   
bladder, urethra,   
kidney, or prostate.   
Other possible   
causes include:  
? Kidney stones.  
? Cancer of the   
urinary tract.  
? Too much calcium   
in the urine.  
? Conditions that   
are passed from   
parent to child   
(inherited   
conditions).  
? Exercise that   
requires a lot of   
energy.  
Infections can   
usually be treated   
with medicine, and a   
kidney stone usually   
will pass through   
your urine. If   
neither of these is   
the cause of your   
hematuria, more   
tests may be needed   
to identify the   
cause of your   
symptoms.  
It is very important   
to tell your health   
care provider about   
any blood in your   
urine, even if it is   
painless or the   
blood stops without   
treatment. Blood in   
the urine, when it   
happens and then   
stops and then   
happens again, can   
be a symptom of a   
very serious   
condition, including   
cancer. There is no   
pain in the initial   
stages of many   
urinary cancers.  
Follow these   
instructions at   
home:  
Medicines  
? Take   
over-the-counter and   
prescription   
medicines only as   
told by your health   
care provider.  
? If you were   
prescribed an   
antibiotic medicine,   
take it as told by   
your health care   
provider. Do not   
stop taking the   
antibiotic even if   
you start to feel   
better.  
Eating and drinking  
? Drink enough fluid   
to keep your urine   
clear or pale   
yellow. It is   
recommended that you   
drink 3?4 quarts   
(2.8?3.8 L) a day.   
If you have been   
diagnosed with an   
infection, it is   
recommended that you   
drink cranberry   
juice in addition to   
large amounts of   
water.  
? Avoid caffeine,   
tea, and carbonated   
beverages. These   
tend to irritate the   
bladder.  
? Avoid alcohol   
because it may   
irritate the   
prostate (men).  
General instructions  
? If you have been   
diagnosed with a   
kidney stone, follow   
your health care   
provider's   
instructions about   
straining your urine   
to catch the stone.  
? Empty your bladder   
often. Avoid holding   
urine for long   
periods of time.  
? If you are female:  
? After a bowel   
movement, wipe from   
front to back and   
use each piece of   
toilet paper only   
once.  
? Empty your bladder   
before and after   
sex.  
? Pay attention to   
any changes in your   
symptoms. Tell your   
health care provider   
about any changes or   
any new symptoms.  
? It is your   
responsibility to   
get your test   
results. Ask your   
health care   
provider, or the   
department   
performing the test,   
when your results   
will be ready.  
? Keep all follow-up   
visits as told by   
your health care   
provider. This is   
important.  
Contact a health   
care provider if:  
? You develop back   
pain.  
? You have a fever.  
? You have nausea or   
vomiting.  
? Your symptoms do   
not improve after 3   
days.  
? Your symptoms get   
worse.  
Get help right away   
if:  
? You develop severe   
vomiting and are   
unable take medicine   
without vomiting.  
? You develop severe   
pain in your back or   
abdomen even though   
you are taking   
medicine.  
? You pass a large   
amount of blood in   
your urine.  
? You pass blood   
clots in your urine.  
? You feel very weak   
or like you might   
faint.  
? You faint.  
Summary  
? Hematuria is blood   
in the urine. It has   
many possible   
causes.  
? It is very   
important that you   
tell your health   
care provider about   
any blood in your   
urine, even if it is   
painless or the   
blood stops without   
treatment.  
? Take   
over-the-counter and   
prescription   
medicines only as   
told by your health   
care provider.  
? Drink enough fluid   
to keep your urine   
clear or pale   
yellow.  
This information is   
not intended to   
replace advice given   
to you by your   
health care   
provider. Make sure   
you discuss any   
questions you have   
with your health   
care provider.  
Document Released:   
2006 Document   
Revised: 2020   
Document Reviewed:   
2018  
[employer] Patient   
Education ?    
[employer]       Adelina Pimentel [facility]  
   
                                                    Urology Office/Clinic Noteon  
 2021   
   
                                                    Urology   
Office/Clinic Note                      Chief Complaint  
gross hematuria  
Natanael is here   
because of a history   
of gross hematuria   
which he relates to   
episode of straining   
to hold his urine   
for a long. He is a   
cigarette smoker and   
has been for least   
30 years with at   
least 1 pack a day.   
He had an episode   
with 3 days of   
painless gross   
hematuria that has   
since resolved.   
Presently has   
microscopic   
hematuria and is   
here today for   
urologic evaluation.   
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   
an undescended right   
that was managed   
surgically. He   
states he had no   
children and wonders   
if this might be the   
cause of his   
perceived   
infertility. He is   
here today to   
establish urologic   
care.  
HPI Staff  
Natanael is a 41 y.o.   
male here for gross   
hematuria. Patient   
states that he   
noticed it after   
holding urine. He   
noticed blood   
intermittently x3   
days. Patient   
noticed that he did   
have some pain in   
groin at that time  
Dysuria: _Denies   
burning or   
discomfort with   
urination  
Incomplete bladder   
emptying: _Denies  
Hematuria: _Has not   
noticed any blood in   
urine over a month  
Frequency: _Denies  
Urgency: _Has some   
urgency  
Nocturia: _maybe   
once  
Stream: _Strong.   
Denies hesitancy..  
Leaking: _Denies  
Post void dripping:   
_Denies  
Wearing pads/   
Depends: _Denies  
Urge incontinence:   
_Denies  
Stress incontinence:   
_Denies  
Incontinence without   
Sensory Awareness:   
_Denies  
Abdominal pain:   
_Denies  
Flank pain: _Denies  
Sexual complaints: _  
History of Present   
Illness  
Reviewed UA,   
referral, and new   
pt. forms.  
There have been no   
associated fever,   
chills, or flank   
pain. Pt. denies any   
pain/burning with   
urination at this   
time.  
Review of Systems  
PHQ Score  
Initial Depression   
Screen Score: 0  
ROS - Provider  
Constitutional:   
denies weight loss,   
denies hot flashes.  
Eyes: denies eye   
problems.  
Gastrointestinal:   
denies nausea,   
denies vomiting.  
Cardiovascular:   
denies chest pain or   
angina.  
Integumentary: no   
dryness  
Musculoskeletal:   
denies   
musculoskeletal   
symptoms.  
ENMT: denies   
otolaryngeal   
symptoms.  
Respiratory: no   
shortness of breath.  
Heme/Lymph: denies   
easy bleeding   
tendency, denies   
easy bruising   
tendency.  
Psychiatric: no   
confusion, no   
anxiety.  
Genitourinary:   
denies dysuria, mild   
hematuria, denies   
discharge, denies   
urinary frequency,   
denies urinary   
hesitancy, denies   
nocturia, denies   
incontinence, denies   
genital sores,   
denies decreased   
libido, and denies   
erectile   
dysfunction.  
Physical Exam  
Vitals &   
Measurements  
HR: 76(Peripheral)   
BP: 138/72  
HT: 168 cm HT: 168.0   
cm WT: 102.1 kg WT:   
102.1 kg BMI: 36.17  
General Appearance:   
alert, no distress,   
well nourished, well   
developed male.  
Head: normocephalic   
.  
Eyes: normal orbit   
and globe.  
ENMT: normal   
examination of   
external ears.  
Chest: Lungs CTA,   
respirations non   
labored.  
Cardiovascular:   
regular rate and   
rhythm.  
Abdomen: soft, non   
distended, no   
tenderness, no mass   
or organomegaly, no   
hernia.  
Genitourinary:   
normal scrotum,   
abnormal testes the   
right testicle is of   
identifiable. He has   
a history of a right   
orchidopexy but I   
could not identify   
testicle on the   
right side. There is   
a scar from his   
previous surgery.   
The left testicle is   
present with small.,   
normal urethra,   
normal epididymis,   
normal vas   
deferens/spermatic   
cord. Today's exam -   
undescended testicle   
on the right.  
Flank Pain: none.  
Bladder:   
nonpalpable.  
Penis: normal shaft,   
normal glans.  
Lymph Nodes:   
unremarkable   
palpation of the   
cervical area.  
Skin: warm, dry, no   
bruising.  
Psychiatric:   
cooperative, affect   
appropriate for age,   
normal judgement,   
euthymic mood.  
Assessment/Plan  
This is a patient   
with a history of   
gross painless   
hematuria. He is a   
longtime cigarette   
smoker and does take   
an aspirin once a   
day for the atrial   
fibrillation. He has   
complaints of   
infertility. He is   
being scheduled for   
cystoscopic   
examination, CT scan   
with contrast and   
urine cytology to   
complete the   
hematuria work-up.   
Physical exam shows   
an undescended right   
testicle. He states   
he had a surgical   
procedure many years   
ago but I cannot   
identify the   
testicle on physical   
exam. The left   
testicle is smaller   
than normal and this   
may be contributing   
to infertility.   
Semen analysis has   
been ordered at the   
patient's request.   
Preop antibiotics   
have been ordered. I   
answered all of his   
questions. Informed   
consent has been   
obtained.  
1. Gross hematuria   
(R31.0: Gross   
hematuria)  
Pt. states noticing   
blood a month ago   
that lasted for   
three days. UA today   
- small. Will send   
UA today for   
FISH/Cyto. Pt. is to   
obtain a CT and will   
schedule pt. for   
Cystoscopy. The   
risks and benefits   
for cystoscopy have   
been discussed. The   
risks include   
bleeding, infection,   
and irritation of   
the bladder and   
urinary channel,   
among others. The   
patient, after being   
informed of   
procedural details   
and after questions   
have been answered,   
wishes to proceed.   
Full informed   
consent has been   
obtained. Will order   
Local anesthesia.   
[facility]X sent to [facility] in [place].  
Ordered:  
Body Mass Index   
(BMI) documented   
3008F  
CT Abdomen/Pelvis w/   
Contrast  
Semen Analysis   
Motility/Count  
Urnls Dip Stick Auto   
w/o M (more content   
not included)...    Normal                                  [facility]  
   
                                        Comment on above:   Result Comment: Elec  
tronically Signed By: Can Phelps MD, Branden LENZ\.br\Date and Time Signed: 21 10:21 EDT\.br\Electronically   
Co-Signed By: Danya Rosales MA\.br\Date and Time Co-Signed:   
21 10:15 EDT   
  
  
  
Vital Signs  
  
  
                          Date Time    Vital Sign   Value        Performing   
Clinician                               Facility  
   
                                                    2024   
14:          Body height         170.18 cm           MD Breonna Mcmullen  
Work Phone:   
[phone]                          [facility]  
   
                                                    2024   
14:                              Body mass index   
(BMI) [Ratio]             31.3 kg/m2                MD Breonna Mcmullen  
Work Phone:   
[phone]                          [facility]  
   
                                                    2024   
14:          Body temperature    98.2 [degF]         MD Breonna Mcmullen  
Work Phone:   
[phone]                          [facility]  
   
                                                    2024   
14:          Body weight         90.71 kg            MD Breonna Mcmullen  
Work Phone:   
[phone]                          [facility]  
   
                                                    2024   
14:          Heart rate          75 /min             MD Breonna Mcmullen  
Work Phone:   
[phone]                          [facility]  
   
                                                    2024   
14:          Respiratory rate    18 /min             MD Breonna Mcmullen  
Work Phone:   
[phone]                          [facility]  
   
                                                    2024   
14:                              SaO2% (BldA) [Mass   
fraction]                 98 %                      MD Breonna Mcmullen  
Work Phone:   
[phone]                          [facility]  
   
                                                    01-   
14:          Body height         170.18 cm           Isreal Bautista  
Other Phone:   
[phone]                           [facility]  
   
                                                    01-   
14:                              Diastolic blood   
pressure                  90 mm[Hg]                 Isreal Bautista  
Other Phone:   
[phone]                           [facility]  
   
                                                    01-   
14:                              SaO2% (BldA) [Mass   
fraction]                 99 %                      Isreal Bautista  
Other Phone:   
[phone]                           [facility]   
Professional   
Corporation  
Other Phone:   
[phone]  
   
                                                    01-   
14:                              Systolic blood   
pressure                  130 mm[Hg]                Isreal Bautista  
Other Phone:   
[phone]                           [facility]  
   
                                                    2023   
09:          Body height         170.18 cm           Kary White  
Other Phone:   
[phone]                           [facility]  
   
                                                    2023   
09:                              Body mass index   
(BMI) [Ratio]             32.57 kg/m2               [employer]arySounder  
Other Phone:   
[phone]                           [facility]   
Professional   
Corporation  
Other Phone:   
[phone]  
   
                                                    2023   
09:          Body weight         94.35 kg            Kary White  
Other Phone:   
[phone]                           [facility]   
Professional   
Corporation  
Other Phone:   
[phone]  
   
                                                    2023   
09:          Body weight         94.34 kg            MD Breonna Mcmullen  
Work Phone:   
[phone]                          [facility]  
   
                                                    2023   
14:          Body height         170.18 cm           Sangita Moscoso  
Other Phone:   
[phone]                           [facility]   
[facility]   
[employer]  
Other Phone:   
[phone]  
   
                                                    2023   
14:                              Body mass index   
(BMI) [Ratio]             32.57 kg/m2               Sangita Moscoso  
Other Phone:   
[phone]                           [facility]   
[facility]   
[employer]  
Other Phone:   
[phone]  
   
                                                    2023   
14:          Body temperature    99.5 [degF]         Sangita Moscoso  
Other Phone:   
[phone]                           [facility]   
[employer]   
[employer]  
Other Phone:   
[phone]  
   
                                                    2023   
14:          Body weight         94.35 kg            Sangita Moscoso  
Other Phone:   
[phone]                           [facility]   
[facility]   
[employer]  
Other Phone:   
[phone]  
   
                                                    2023   
14:                              Diastolic blood   
pressure                  76 mm[Hg]                 Sangita Moscoso  
Other Phone:   
[phone]                           [facility]   
[facility]   
[employer]  
Other Phone:   
[phone]  
   
                                                    2023   
14:          Respiratory rate    18 /min             Sangita Moscoso  
Other Phone:   
[phone]                           [facility]   
[facility]   
[employer]  
Other Phone:   
[phone]  
   
                                                    2023   
14:                              SaO2% (BldA) [Mass   
fraction]                 98 %                      Sangita Moscoso  
Other Phone:   
[phone]                           [facility]   
[facility]   
[employer]  
Other Phone:   
[phone]  
   
                                                    2023   
14:                              Systolic blood   
pressure                  112 mm[Hg]                Sangita Moscoso  
Other Phone:   
[phone]                           [facility]   
[facility]   
[employer]  
Other Phone:   
[phone]  
   
                                                    2022   
15:          Body height         167.64 cm           Breonna Mcmullen  
Work Phone:   
[phone]                          [facility] [employer] [address]  
Work Phone:   
[phone]  
   
                                                    2022   
15:                              Body mass index   
(BMI) [Ratio]             34.38 kg/m2               Breonna Mcmullen  
Work Phone:   
[phone]                          [facility] [employer] [address]  
Work Phone:   
[phone]  
   
                                                    2022   
15:                              Body surface area   
Derived from formula      2.05 m2                   Breonna Mcmullen  
Work Phone:   
[phone]                          [facility]   
[employer] [address]  
Work Phone:   
[phone]  
   
                                                    2022   
15:          Body weight         96.62 kg            Breonna Mcmullen  
Work Phone:   
[phone]                          [facility]   
[employer] [address]  
Work Phone:   
[phone]  
   
                                                    2022   
15:                              Diastolic blood   
pressure                  81 mm[Hg]                 Breonna Mcmullen  
Work Phone:   
[phone]                          [facility]   
[employer] [address]  
Work Phone:   
[phone]  
   
                                                    2022   
15:          Heart rate          67 /min             Breonna Mcmullen  
Work Phone:   
[phone]                          [facility]   
[employer] [address]  
Work Phone:   
[phone]  
   
                                                    2022   
15:                              Systolic blood   
pressure                  126 mm[Hg]                Breonna Mcmullen  
Work Phone:   
[phone]                          [facility]   
[employer] [address]  
Work Phone:   
[phone]  
   
                                                    2021   
15:          Body height         170.18 cm           [employer]  
Other Phone:   
[phone]                           [facility]   
[facility]   
[employer]  
Other Phone:   
[phone]  
   
                                                    2021   
15:                              Body mass index   
(BMI) [Ratio]             33.59 kg/m2               [employer]  
Other Phone:   
[phone]                           [facility]   
[facility]   
[employer]  
Other Phone:   
[phone]  
   
                                                    2021   
15:          Body temperature    98.9 [degF]         [employer]  
Other Phone:   
[phone]                           [facility]   
[facility]   
[facility]  
Other Phone:   
[phone]  
   
                                                    2021   
15:          Body weight         97.3 kg             [employer]  
Other Phone:   
[phone]                           [facility]   
[facility]   
[facility]  
Other Phone:   
[phone]  
   
                                                    2021   
15:                              Diastolic blood   
pressure                  80 mm[Hg]                 [employer]  
Other Phone:   
[phone]                           [facility]   
[facility]   
[facility]  
Other Phone:   
[phone]  
   
                                                    2021   
15:                              SaO2% (BldA) [Mass   
fraction]                 99 %                      [employer]  
Other Phone:   
[phone]                           [facility]   
[facility]   
[facility]  
Other Phone:   
[phone]  
   
                                                    2021   
15:                              Systolic blood   
pressure                  105 mm[Hg]                [employer]  
Other Phone:   
[phone]                           [facility]   
Professional   
[facility]por[facility]  
Other Phone:   
[phone]  
  
  
  
Encounters  
  
  
                          Encounter Date Encounter Type Care Provider Facility  
   
                                                    Start: 2024  
End: 2024           ambulatory                MD Breonna Mcmullen  
Work Phone:   
[phone]                          [facility]  
Work Phone:   
[phone]  
   
                                                    Start: 2024  
End: 2024                         Patient encounter   
procedure                               MD Breonna Mcmullen  
Work Phone:   
[phone]                          [facility]   
[facility]   
[place]  
Work Phone:   
[phone]  
   
                                                    Start: 2024  
End: 2024           ambulatory                Kary White  
Other Phone:   
[phone]                           [facility]   
[facility]   
[employer]  
Other Phone:   
[phone]  
   
                          Start: 2024 Telephone encounter Kary White  
 [facility]  
   
                                                    Start: 01-  
End: 01-           ambulatory                Isreal Bautista  
Other Phone:   
[phone]                           [facility]   
[facility]   
[facility]  
Other Phone:   
[phone]  
   
                                        Start: 01-   Office consultation   
new/estab patient 60   
min                       Isreal Bautista               [facility]  
   
                                                    Start: 01-  
End: 01-                         Patient encounter   
procedure                               MD Breonna Mcmullen  
Work Phone:   
[phone]                          [facility]  
Work Phone:   
[phone]  
   
                                                    Start: 12-  
End: 12-     ambulatory          BREONAN MCMULLEN       Not Available  
   
                                        Start: 2023   Office outpatient ne  
w   
45 minutes                Kary White          [facility]   
Neurosurgery  
   
                                                    Start: 2023  
End: 2023     ambulatory          Kary White    Facility:[facility]  
   
                                                    Start: 2023  
End: 2023           ambulatory                MD Breonna Mcmullen  
Work Phone:   
[phone]                          [facility]  
Work Phone:   
[phone]  
   
                                                    Start: 2023  
End: 2023                         Patient encounter   
procedure                               MD Breonna Mcmullen  
Work Phone:   
[phone]                          [facility]   
[place]  
Work Phone:   
[phone]  
   
                                                    Start: 2023  
End: 2023                         Patient encounter   
procedure                               MD Breonna Mcmullen  
Work Phone:   
[phone]                          [facility]   
[facility]  
Work Phone:   
[phone]  
   
                                                    Start: 2023  
End: 2023     ambulatory          KARY BENITEZ    Not Available  
   
                                                    Start: 2023  
End: 2023           ambulatory                Sangita Moscoso  
Other Phone:   
[phone]                           [facility]   
[facility]   
[facility]  
Other Phone:   
[phone]  
   
                                        Start: 2023   Office outpatient vi  
sit   
25 minutes                Sangita Moscoso              [facility]  
   
                                                    Start: 2023  
End: 2023     ambulatory          BERNARDINO CONKLIN .         Facility:[facility]  
   
                          Start: 2023 ambulatory   Dr. Jhony Richardson   
Facility:  
   
                                                    Start: 2022  
End: 2022     ambulatory          AMINA GONZALEZ     Facility:[facility]  
   
                                                    Start: 2022  
End: 2022                         Emergency department   
patient visit             [facility]  
   
                          Start: 2022 ambulatory   DR BREONNA MCMULLEN Facility  
:[facility]  
   
                                                    Start: 2022  
End: 2022     ambulatory          DR NATANAEL BELLO Facility:[facility]  
   
                                                    Start: 2022  
End: 2022     ambulatory          DR BREONNA MCMULLEN      Facility:[facility]  
   
                                                    Start: 2022  
End: 2022     ambulatory          TUTU MCCLOUD  Facility:[facility]  
   
                                        Start: 2022   Office outpatient vi  
sit   
25 minutes                              Breonna Mcmullen  
Work Phone:   
[phone]                          [facility]   
[facility] [address] [facility]  
Work Phone:   
[phone]  
   
                                        Start: 2021   Office outpatient ne  
w   
30 minutes                [facility]   
[facility]  
  
  
  
Procedures  
  
  
                          Date         Procedure    Procedure Detail Performing   
Clinician  
   
                                        Start: 2023   X-ray of lumbar spin  
e,   
six views including   
bending views                                       MD Breonna Josias  
Work Phone:   
[phone]  
   
                                       Bone marrow sampling              Breonna Mcmullen  
Work Phone:   
[phone]  
   
                                       Operation on mouth              Breonna Mcmullen  
Work Phone:   
[phone]  
   
                                                            Operation on the inn  
er   
ear                                                 Breonna Mcmullen  
Work Phone:   
[phone]  
   
                                                            Scrotum and testicle  
   
operation                                           Breonna Mcmullen  
Work Phone:   
[phone]  
   
                                                    NEGATED: Highlighted   
row has not occurred! Total colonoscopy                       Breonna Mcmullen  
Work Phone:   
[phone]  
  
  
  
Plan of Treatment  
  
  
                          Date         Care Activity Detail       Author  
   
                                        Start: 2023   [facility], Provider:   
Jhony Richardson,   
Status: Pen, Time: 1:00   
PM                                      [facility], Provider:   
Jhony Richardson,   
Status: Pen, Time:   
1:00 PM                                 [facility]   
[facility] [address]  
Work Phone:   
[phone]  
  
  
  
Payers  
  
  
                          Date         Payer Category Payer        Policy ID  
   
                          2023   Self-pay                  [id number]  
[id number]  
   
                          10-   [facility]              [id number]  
[id number]  
   
                          2022   Unknown                   [id number]  
   
                          2021   Medicare                  [id number] .  
[id number]  
   
                          2019   Medicare                  [id number]   
[id number]  
   
                          1979   Unknown                   [id number] 2.16.8  
[id number]  
   
                          1979   Unknown                   [id number] 2.16.  
[id number]  
   
                          1979   Unknown                   [id number] 2.16.84  
[id number]  
   
                          1979   Unknown                   [id number] 2.16.84  
[id number]  
   
                          1979   Unknown                   [id number] 2.16.84  
[id number]  
   
                          1979   Unknown                   [id number] 2.16.84  
[id number]  
   
                          1979   Unknown                   [id number]  
[id number]  
   
                          1979   Unknown                   [id number]  
[id number]  
   
                          1979   Unknown                   [id number]  
[id number]  
   
                          1979   Unknown                   [id number]  
[id number]  
   
                          1960   Medicaid                  [id number] 2.  
[id number]  
   
                          1960   Private Health Insurance              [id number]  
[id number]  
   
                          1961   Unknown                   [id number]  
   
                          1961   Unknown                   [id number]  
   
                                       [employer]              [id number]  
[id number]  
   
                                       Private Health Insurance              U  
[id number]  
   
                                       Unknown                     
   
                                       Unknown                   [id number]  
[id number]  
  
  
  
Social History  
  
  
                          Date         Type         Detail       Facility  
   
                                       No alcohol use No alcohol use [facility]  
Other Phone:   
[phone]  
   
                                        Comment on above:   1 cup-2 cups coffee   
in AM, 2-3 POPS A DAY;   
   
                                       Sex Assigned At Birth Sex Assigned At Bir  
th [facility]  
Other Phone:   
[phone]  
   
                          Start: 1980 Sex Assigned At Birth Male         F  
[facility]  
   
                                        Start: 2013   Tobacco smoking   
status NHIS               Smoker (finding)          [facility]  
  
  
  
Evaluation note 01-  
  
  
                                Note Date & Type Note            Facility  
  
  
  
                                                    01- Evaluation note   
  
  
  
                                                    Encounter   
Date                      Diagnosis                 Assessment Notes  
   
                                                    15 Speedy,   
2024                                    Lumbar   
radiculopathy   
(ICD-10 -   
M54.16)                                                     44 y/o male here   
to discuss his   
chronic pain. He   
states approx. 1   
year ago he   
experienced low   
back and   
intermittent   
bilateral lower   
extremity pain.   
He states he   
attempted PT at   
the onset of pain   
which provided   
minimal   
improvement in   
his symptoms. He   
notes he recently   
purchased a new   
bed and no longer   
has pain. Prior   
to examining the   
patient, I   
reviewed progress   
notes from his   
referring   
provider Kary White.   
Pertinent imaging   
of the lumbar   
spine was   
reviewed and   
discussed in   
detail with the   
patient which   
shows   
degenerative   
changes. Anatomy   
of spine   
discussed in   
detail with   
patient in   
regards to   
patients   
condition.   
Overall, he   
appears to be   
doing well and   
does not require   
interventional   
treatment at this   
time. He is   
encouraged to   
call the office   
if his symptoms   
return  
  
   
                                                    15 Speedy,   
2024                                    Sacroiliitis   
(ICD-10 - M46.1)                                            In the future if   
the pain   
persists, we can   
consider   
proceeding with a   
sacroiliac joint   
injection under   
fluoroscopic   
guidance.  
  
   
                                                    15 Speedy,   
2024                                    Chronic pain   
(ICD-10 -   
G89.29)                                                     UDS performed   
through   
[facility]   
today, will await   
confirmatory   
results. Patient   
is encouraged to   
call the office   
if his symptoms   
return  
  
   
                                                    15 Speedy,   
2024            Other                                           Medical decision  
   
making shows a   
new problem to me   
with further   
workup planned or   
suggested with   
the potential for   
extensive   
treatment options   
that were   
considered with   
the most   
applicable given   
this patient's   
situation as   
noted above.   
Treatment options   
considered   
include a   
combination of   
physical therapy   
approaches,   
pharmacologic   
management, and   
interventional   
procedures. Those   
most applicable   
to the patient   
were discussed at   
this time. Risk   
of complications   
and/or morbidity   
and mortality is   
high given that   
acute and chronic   
pain poses a   
threat to life   
and bodily   
function if   
undertreated,   
poorly treated or   
with failure to   
maintain adequate   
treatment and   
timely followup.   
Given the serious   
and fluctuating   
nature of pain   
with extensive   
consideration for   
whenever pain   
changes, there   
always remains   
the possibility   
of prolonged   
functional   
impairment   
requiring   
constant patient   
reassessment and   
high-level   
medical decision   
making. The   
amount and   
complexity of   
data reviewed is   
high given that   
patient labs,   
radiology   
reports, and   
other test were   
obtained,   
reviewed and   
summarized as   
applicable from   
the physician   
portal and/or   
outside medical   
records.   
Pertinent   
positive and   
negative findings   
were considered   
in medical   
decision-making.  
  
  
[facility]  
Other Phone: [phone]  
  
Evaluation note 2023  
  
  
                                Note Date & Type Note            Facility  
  
  
  
                                                    2023 Evaluation note   
  
  
  
                                                    Encounter   
Date                      Diagnosis                 Assessment   
Notes  
   
                                                    11 Dec,   
2023                                    Lumbar   
radiculopathy   
(ICD-10 -   
M54.16)                                             Independently   
reviewed the MRI   
of the lumbar   
spine from   
2023 with   
patient   
face-to-face and   
which shows at   
the L3-4 there is   
a mild to   
moderate disc   
space narrowing   
with mild diffuse   
disc herniation   
and mild to   
moderate   
hypertrophic   
facet limited to   
flavum changes   
with mild central   
spinal stenosis   
and   
neuroforaminal   
narrowing. At   
L4-5 there is a   
grade 1   
anterolisthesis   
mild disc space   
narrowing with   
moderate disc   
herniation and   
marked   
hypertrophic   
facet changes   
with results in   
mild to moderate   
central spinal   
canal stenosis   
lateral recess   
neuroforaminal   
narrowing greater   
on the right,   
consistent with   
the patient's   
radicular   
symptoms. At   
L5-S1 there is a   
mild disc   
herniation   
without   
significant   
central spinal   
stenosis.   
Discussion of   
conservative   
thearpy in which   
patient has had   
[facility]   
in [place], with   
minimal relief.   
Will get release   
of information   
for continuity of   
care. WIll order   
xray of lumbar   
6view today. PDMP   
reviewed no   
controlled   
prescriptions.   
Pharmacological   
managment will   
continue with   
current   
medication as   
prescribed. Will   
refer to Dr Bautista   
for sacroiliac   
injections.   
Follow up in 12   
weeks.  
Medical decision   
making shows a  
new problem to me   
with further   
workup planned or   
suggested with   
the potential  
for extensive   
treatment options   
that were   
considered with   
the most   
applicable  
given this   
patient's   
situation as   
noted above.   
Treatment options   
considered  
include a   
combination of   
physical therapy   
approaches,   
pharmacologic   
management,  
and   
interventional   
procedures. Those   
most applicable   
to the patient   
were  
discussed at this   
time. Risk of   
complications   
and/or morbidity   
and mortality is  
high given that   
acute and chronic   
pain poses a   
threat to life   
and bodily   
function  
if undertreated,   
poorly treated or   
with failure to   
maintain adequate   
treatment  
and timely follow   
up. Given the   
serious and   
fluctuating   
nature of pain   
with  
extensive   
consideration for   
whenever pain   
changes, there   
always remains   
the  
possibility of   
prolonged   
functional   
impairment   
requiring   
constant patient  
reassessment and   
high-level   
medical decision   
making. The   
amount and   
complexity  
of data reviewed   
is moderate given   
that patient   
labs, radiology   
reports, and  
other test were   
obtained,   
reviewed and   
summarized as   
applicable from   
the  
physician portal   
and/or outside   
medical records.   
Pertinent   
positive and  
negative findings   
were considered   
in medical   
decision-making.  
                                          
   
                                                    11 Dec,   
2023                                    Jackson toe,   
right (ICD-10 -   
M20.5X1)                                                      
   
                                                    11 Dec,   
2023                                    Jackson toe, left   
(ICD-10 -   
M20.5X2)                                                      
   
                                                    11 Dec,   
2023                                    Sacroiliac   
inflammation   
(ICD-10 - M46.1)                                              
  
  
[facility]  
Other Phone: [phone]  
  
Evaluation note 2023  
  
  
                                Note Date & Type Note            Facility  
  
  
  
                                                    2023 Evaluation note   
  
  
  
                                                    Encounter   
Date                      Diagnosis                 Assessment Notes  
   
                                                    12 Sep,   
2023                                    Sore   
throat   
(ICD-10 -   
J02.9)                                                        
   
                                                    12 Sep,   
2023                                    Viral URI   
with cough   
(ICD-10 -   
J06.9)                                                      Advised patient   
that COVID PCR   
test and rapid  
Strep test was   
negative today.   
Advised patient   
that will treat   
as viral  
URI. Supportive   
care as directed,   
increase fluids   
and rest, Tylenol   
as  
directed, rx of   
Bromfed and   
prednisone, cool   
mist  
humidifier,   
throat lozenges.   
Discussed   
infection control   
practices such as   
good  
hand washing and   
mask wearing.   
Work note   
provided, no   
extension   
allowed. Patient   
to follow up with   
PCP if symptoms  
persist or worsen   
despite   
treatment.   
Immediate eval   
for SOB,   
difficulty  
breathing, chest   
pain, fevers that   
do not break with   
antipyretic or   
any other  
concerning   
symptoms as   
reviewed on   
patient education   
handout. Patient  
verbalizes   
understanding and   
is agreeable to   
treatment plan.   
Patient left in  
stable condition  
  
  
  
[facility]  
Other Phone: [phone]  
  
Evaluation note 2021  
  
  
                                Note Date & Type Note            Facility  
  
  
  
                                                    2021 Evaluation note   
  
  
  
                                                    Encounter   
Date                      Diagnosis                 Assessment   
Notes  
   
                                                                                        Obstructive   
sleep apnea   
(ICD-10 -   
G47.33)                                               
  
  
Discussed the   
diagnosis of   
obstructive   
sleep apnea,   
findings on   
previous sleep   
study which was   
obtained from   
[facility]   
[facility],   
persistent   
symptoms,   
reevaluating   
the presence   
and severity of   
sleep   
disordered   
breathing   
specially for   
insurance   
requirements to   
be able to   
initiate   
positive   
pressure   
therapy.                                  
  
  
  
  
[facility]  
Other Phone: [phone]  
  
Evaluation note  
  
  
                                Note Date & Type Note            Facility  
   
                                Evaluation note No assessment information [facility]   
Ctr  
Work Phone: [phone]  
  
  
  
Evaluation note  
  
  
                                Note Date & Type Note            Facility  
   
                                Evaluation note No Information  [facility]  
Other Phone: [phone]  
  
  
  
History general Narrative - Reported  
  
  
                                Note Date & Type Note            Facility  
  
  
  
                                                    History general Narrative -   
Reported   
  
  
  
                                                    Type  
   
                                Medical History epilepsy          
   
                                Medical History herniated disc    
   
                                        Surgical History    abdominal surgery (t  
esticals tied to   
pelvic bone)                              
   
                                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   
ear   
   
                                Hospitalization History abdominal surgery   
   
                                Hospitalization History bone marrow transplant 1  
991  
  
  
[facility]  
Other Phone: [phone]  
  
History general Narrative - Reported  
  
  
                                Note Date & Type Note            Facility  
  
  
  
                                                    History general Narrative -   
Reported   
  
  
  
                                                    Type  
   
                                Medical History epilepsy          
   
                                Medical History herniated disc    
   
                                Medical History heart disease     
   
                                        Surgical History    abdominal surgery (t  
esticals tied to   
pelvic bone)                              
   
                                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   
ear   
   
                                Hospitalization History abdominal surgery   
   
                                Hospitalization History bone marrow transplant 1  
991  
  
  
[facility]  
Other Phone: [phone]  
  
History of Present illness Narrative  
  
  
                                Note Date & Type Note            Facility  
   
                                                    History of Present illness   
Narrative                               Patient is here for follow-up he   
was evaluated in the past for   
symptoms of palpitation,   
obesity, shortness of breath and   
hyperlipidemia. Since last time   
I saw him he reports that he   
developed Covid. He was having   
some shortness of breath and   
cough. Also had few episodes of   
palpitation. He denies   
lightheadedness, dizziness or   
syncope. Apparently he is was   
seen by his family physician and   
a stress test was recommended   
but the patient failed to show   
up for that. His recent sleep   
study was inadequate and he is   
telling me that he will repeated   
in the near future.Assessment1.   
Palpitation . His event monitor   
despite complaint of palpitation   
failed to demonstrate any   
significant arrhythmia. Patient   
mother indicated that he does   
have an element of anxiety. And   
documentation of recurrence   
atrial fibrillation2. Prior   
history of one episode of atrial   
fibrillation with low chads   
vascular score3. Obesity   
significant weight changes4.   
Hyperlipidemia on treatment5.   
Shortness of breath related to   
prior tobacco use6. Tobacco   
use7. Patient report prior   
diagnosis of sleep apnea is   
scheduled to undergo repeat   
study in the near future8.   
Recent COVID-19 infectionPlan1.   
Patient was counseled regarding   
risk factor modification2.   
Patient was advised to remain on   
an aspirin 81 mg once daily3.   
Patient was counseled regarding   
losing weight, exercise and   
dietary modification4. With   
repeating his sleep study5. I do   
not find me if he develop any   
recurrence of his symptoms of   
palpitation or chest pain6. I   
suggested 6 month follow-up and   
I advised the patient to try to   
lose 10 pounds by next time7. We   
will see him in 1 year in   
follow-up or earlier if the need   
arise                                   [facility]   
[facility] [address] DO  
Work Phone: [phone]  
  
  
  
Reason for visit Narrative  
  
  
                                Note Date & Type Note            Facility  
   
                                        Reason for visit Narrative PAIN MANAGEME  
NT REFERRAL   
CONSULT                                 [facility]  
Other Phone: [phone]  
  
  
  
Chief Complaint  
NATANAEL GANDHI is being seen for a 6 month follow-up of.  
  
Family History  
                              Unknown Family Member  
  
                                Name            Dates           Details  
   
                                                    Family history of cerebrovas  
cular accident (CVA): Father(V17.1,   
Z82.3)  
                                                    Status:Active  
   
                                                    Family history of diabetes m  
ellitus: Father(V18.0, Z83.3)  
                                                    Status:Active  
  
  
  
                          Relationship Condition    Age at Onset Recorded Date/T  
gris  
   
                          Not Specified Heart disease Unknown        
  
  
  
Summary Purpose  
  
  
                                                      
  
  
  
Advance Directives  
  
  
                                Advance Directive Response        Recorded Date/  
Time  
   
                                Advance Directives No              2023 4:42pm  
  
  
  
Chief Complaint and Reason for Visit  
  
  
                                        Chief Complaint     M54.16  
  
  
  
                                        Chief Complaint     Referred By Dr. Mcmullen  
 Acute Low Back Pain  
M54.16  
Reff By Kary White, Consult For Inna  
left thigh pain  
  
  
  
Reason for Referral  
  
  
                                        Reason              evaluate and treat  
   
                                        Diagnosis 1         Lumbar radiculopathy  
 (M54.16)  
   
                                        Referral Organization [facility]  
urosurgery  
   
                                        Referring Provider First Name Kary  
   
                                        Referring Provider Last Name Christopher  
   
                                        Referring Provider Specialty Nurse Pract  
itioner  
   
                                        Referred Organization [facility]  
t  
   
                                        Referred Provider   Isreal Bautista  
   
                                        Referred Address    [address] [place] [id number]  
   
                                        Referred Provider Specialty Pain Medicin  
e  
   
                                        Referral Priority   Routine  
   
                                        Referral Appointment Date 2024-01-15  
   
                                        General Notes       Felicia Crowder  11:41:15 AM >received   
today, MRI was completed at [facility], sending p2p at this   
time for scheduling  
Felicia Crowder 2023 01:08:17 PM >pt has been   
scheduled  
  
  
  
Additional Source Comments  
  
  
  
                                                    PREGNANCY (unrecognized sect  
ion and content)  
   
                                                    No Pregnancy Status Records   
FoundNo Pregnancy Status Records FoundNo Pregnancy   
Status   
Records FoundNo Pregnancy Status Records FoundNo Pregnancy Status Records 
FoundNo   
Pregnancy Status Records FoundNo Pregnancy Status Records FoundNo Pregnancy 
Status   
Records Found  
  
  
  
  
                                                    INFORMATION SOURCE (unrecogn  
ized section and content)  
   
                                          
  
  
  
                                        DATE CREATED        AUTHOR  
   
                                2022                      Margarito Egan [facility]  
  
  
  
                                DATE CREATED    AUTHOR          AUTHOR'S ORGANIZ  
ATION  
   
                                08/10/2022                      [facility]  
dical Specialist  
  
  
  
                                DATE CREATED    AUTHOR          AUTHOR'S ORGANIZ  
ATION  
   
                                2022                      [employer]  
  
  
  
                                DATE CREATED    AUTHOR          AUTHOR'S ORGANIZ  
ATION  
   
                                02/15/2023                      [facility]  
  
  
  
                                DATE CREATED    AUTHOR          AUTHOR'S ORGANIZ  
ATION  
   
                                02/15/2023                       [employer]  
  
  
  
                                DATE CREATED    AUTHOR          AUTHOR'S ORGANIZ  
ATION  
   
                                2023                      [facility]  
pital  
  
  
  
                                DATE CREATED    AUTHOR          AUTHOR'S ORGANIZ  
ATION  
   
                                2023                      [facility]  
dical Specialists [facility]  
  
  
  
                                DATE CREATED    AUTHOR          AUTHOR'S ORGANIZ  
ATION  
   
                                2024                      [facility]  
  
  
  
  
  
                                                    REASON FOR VISIT (unrecogniz  
ed section and content)  
   
                                                    SLEEP LABPOSSIBLE STREP THRO  
AT, SWOLLEN GLANDSreferred by Dr. Mcmullen acute low   
back   
pain w/sciaticaREFF BY KAYR WHITE, CONSULT FOR LUMBAR RADICULOPATHY  
  
  
  
  
                                                    Care Teams (unrecognized sec  
tion and content)  
   
                                          
  
  
  
                                                    Team Status: Active   
   
                          Member       Role         Status       Álvaro Mcmullen MD Primary Care Provider Active         
  
  
  
                                                    Team Status: Inactive   
   
                          Member       Role         Status       Álvaro Mcmullen MD Primary Care Provider Active         
   
                          ARNOL Benitez Attending Provider Active       
    
  
  
  
                                                    Team Status: Inactive   
   
                          Member       Role         Status       ARNOL Quijano Attending Provider Active       
  Start: 2023  
End: 2023  
  
  
  
                                                    Team Status: Inactive   
   
                          Member       Role         Status       Álvaro Mcmullen MD Primary Care Provider Active       St  
art: 2023  
End: 2023  
   
                          ARNOL Benitez Attending Provider Active       
  Start: 2023  
End: 2023  
  
  
  
                                                    Team Status: Inactive   
   
                          Member       Role         Status       Álvaro Bautista MD Attending Provider Active       Sta  
rt: 2024  
End: 2024  
  
  
  
                                                    Team Status: Inactive   
   
                          Member       Role         Status       Álvaro Mcmullen MD Primary Care Provider Active       St  
art: 2024  
End: 2024  
   
                          LEONARD Mckeon Attending Provider Active         
Start: 2024  
End: 2024  
  
  
  
  
  
                                                    Goals (unrecognized section   
and content)  
   
                                                    Goals may be documented in a  
n alternate section  
  
FOR RECORDS PERTAINING TO PATIENTS WHO ARE OR HAVE BEEN ENROLLED IN A CHEMICAL 
DEPENDENCY/SUBSTANCEABUSE PROGRAM, SOME INFORMATION MAY BE OMITTED. This 
clinical summary was aggregated from multiple sources. Caution should be 
exercised in using it in the provision of clinical care. This summary normalizes
 information from multiple sources, and as a consequence, information in this 
document may materially change the coding, format and clinical context of 
patient data. In addition, data may be omitted in some cases. CLINICAL DECISIONS
 SHOULD BE BASED ON THE PRIMARY CLINICAL RECORDS. [facility] [employer] [facility]. provides
 no warranty or guarantee of the accuracy or completeness of information in this
 document.

## 2024-03-02 NOTE — PC.NURSE
1245- PT HERE TO GET A WORK NOTE WITH RESTRICTIONS AT WORK. PT PLACED ON A JOB THAT HAS SOME TYPE OF STROBE LIGHT ON IT. PT IS EPILEPTIC, AND IS WORRIED ABOUT THIS LIGHT TRIGGERING A SEIZURE AT WORK. PT TAKES KEPPRA AT HOME. HAS NOT HAD ANY SEIZURES 
RECENTLY AND DOES NOT HAVE ANY COMPLAINTS. PT HERE ONLY FOR WORK NOTE

## 2024-10-04 ENCOUNTER — HOSPITAL ENCOUNTER (EMERGENCY)
Age: 44
Discharge: HOME | End: 2024-10-04
Payer: MEDICAID

## 2024-10-04 VITALS — BODY MASS INDEX: 33.1 KG/M2

## 2024-10-04 VITALS
OXYGEN SATURATION: 96 % | SYSTOLIC BLOOD PRESSURE: 177 MMHG | DIASTOLIC BLOOD PRESSURE: 80 MMHG | HEART RATE: 97 BPM | TEMPERATURE: 98.96 F

## 2024-10-04 DIAGNOSIS — S39.012A: Primary | ICD-10-CM
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
## 2024-10-04 DIAGNOSIS — F17.200: ICD-10-CM

## 2024-10-04 LAB
ADD MANUAL DIFF: NO
ANION GAP: 11.6
BLOOD UREA NITROGEN: 9 MG/DL (ref 7–18)
CALCIUM: 9.2 MG/DL (ref 8.5–10.1)
CARBON DIOXIDE: 26.2 MMOL/L (ref 21–32)
CAST SEEN?: (no result) #/LPF
CHLORIDE: 102 MMOL/L (ref 98–107)
CO2 BLD-SCNC: 26.2 MMOL/L (ref 21–32)
ESTIMATED GFR (AFRICAN AMERICA: >60
ESTIMATED GFR (NON-AFRICAN AME: >60
GLUCOSE BLD-MCNC: 128 MG/DL (ref 74–106)
GLUCOSE URINE UA: NEGATIVE MG/DL
HCT VFR BLD CALC: 49.8 % (ref 42–54)
HEMATOCRIT: 49.8 % (ref 42–54)
HEMOGLOBIN: 17.2 G/DL (ref 14–18)
IMMATURE GRANULOCYTES ABS AUTO: 0.04 10^3/UL (ref 0–0.03)
IMMATURE GRANULOCYTES PCT AUTO: 0.4 % (ref 0–0.5)
LYMPHOCYTES  ABSOLUTE AUTO: 3.3 10^3/UL (ref 1.2–3.8)
MCV RBC: 89.6 FL (ref 80–94)
MEAN CORPUSCULAR HEMOGLOBIN: 30.9 PG (ref 25.9–34)
MEAN CORPUSCULAR HGB CONC: 34.5 G/DL (ref 29.9–35.2)
MEAN CORPUSCULAR VOLUME: 89.6 FL (ref 80–94)
PLATELET # BLD: 318 10^3/UL (ref 150–450)
PLATELET COUNT: 318 10^3/UL (ref 150–450)
POTASSIUM SERPLBLD-SCNC: 3.8 MMOL/L (ref 3.5–5.1)
POTASSIUM: 3.8 MMOL/L (ref 3.5–5.1)
RED BLOOD COUNT: 5.56 10^6/UL (ref 4.7–6.1)
SODIUM BLD-SCNC: 136 MMOL/L (ref 136–145)
SODIUM: 136 MMOL/L (ref 136–145)
WBC # BLD: 11.4 10^3/UL (ref 4–11)
WHITE BLOOD COUNT: 11.4 10^3/UL (ref 4–11)

## 2024-10-04 PROCEDURE — 81001 URINALYSIS AUTO W/SCOPE: CPT

## 2024-10-04 PROCEDURE — 80048 BASIC METABOLIC PNL TOTAL CA: CPT

## 2024-10-04 PROCEDURE — 85025 COMPLETE CBC W/AUTO DIFF WBC: CPT

## 2024-10-04 PROCEDURE — 74176 CT ABD & PELVIS W/O CONTRAST: CPT

## 2024-10-04 PROCEDURE — 99284 EMERGENCY DEPT VISIT MOD MDM: CPT

## 2024-10-04 PROCEDURE — 36415 COLL VENOUS BLD VENIPUNCTURE: CPT

## 2024-10-04 NOTE — ED_ITS
HPI    
HPI - General Adult    
General    
Chief complaint: Abdominal Pain    
Stated complaint: LOWER BACK, LEFT FLANK PAIN    
Time Seen by Provider: 10/04/24 08:38    
Source: patient    
Mode of arrival: walk-in    
Limitations: no limitations    
History of Present Illness    
HPI narrative:     
44-year-old male presents to the emergency department for left lower back pain.   
He has had for 3 days and there was no injury.  No dysuria or hematuria and he   
has never had a kidney stone.  He remembers lifting a heavy item before this   
started but there was no pain initially.    
Related Data    
                                Home Medications    
    
    
    
?Medication ?Instructions ?Recorded ?Confirmed    
     
aspirin 81 mg capsule 81 mg PO DAILY 09/23/23 01/16/24    
     
levetiracetam 100 mg/mL oral 750 mg PO BID 09/23/23 01/16/24    
    
solution       
     
simvastatin 20 mg tablet 20 mg PO DAILY 01/16/24 01/16/24    
    
    
                                  Previous Rx's    
    
    
    
?Medication ?Instructions ?Recorded    
     
brompheniramine-pseudoephedrine-DM 10 ml PO Q6H PRN cold symptoms 01/16/24    
    
2 mg-30 mg-10 mg/5 mL oral syrup #200 mL     
    
(Bromfed DM)      
     
methocarbamol 750 mg tablet 750 mg PO TID PRN pain #20 tabs 01/16/24    
     
methylprednisolone 4 mg tablets in See Rx Instructions .Route 01/16/24    
    
a dose pack (Medrol (Caden)) .COMPLEX #21 ea     
     
ibuprofen 800 mg tablet 800 mg PO Q8H PRN pain #20 tabs 10/04/24    
     
methocarbamol 750 mg tablet 750 mg PO Q8H #20 tabs 10/04/24    
    
    
    
                                    Allergies    
    
    
    
Allergy/AdvReac Type Severity Reaction Status Date / Time    
     
topiramate [From Topamax] AdvReac Severe suicidal Verified 10/29/23 12:40    
     
diphenhydramine AdvReac Intermediate Hypertensio Verified 10/29/23 12:40    
    
[From Benadryl]   n      
    
    
    
    
Opioid HPI    
Opioid Management    
Most Recent Opioid Data:     
    
    
                Last Pain Scale 0               09/23/23 11:25      
    
    
    
Review of Systems    
    
    
ROS      
    
 Narrative A ten point review of systems is negative except as noted above.       
    
    
PFSH    
[facility]    
Medical History (Updated 10/04/24 @ 09:33 by Iggy Dias MD)    
    
Epilepsy    
   ?G40.909 - Epilepsy, unspecified, not intractable, without status epilepticus  
   (ICD-10)    
    
    
Social History (Reviewed 01/16/24 @ 16:05 by PONCE Ariza)    
Smoking status:  Current every day smoker     
Little interest or pleasure in doing things:  not at all     
Feeling down, depressed, or hopeless:  not at all     
    
    
    
Exam    
Narrative    
Exam Narrative:     
Nurses note and vital signs reviewed and patient is not hypoxic.    
    
General:  The patient appears well and in no apparent distress.     
Skin:  Warm, dry, no pallor noted.  There is no rash noted.    
Head:  Normocephalic, atraumatic    
Eye: Normal conjunctiva, no drainage    
Ears, Nose, Mouth, and Throat: oral mucosa is moist. Nares patent.    
Cardiovascular:  Regular Rate and Rhythm    
Respiratory:  Patient is in no distress, no accessory muscle use, lungs are   
clear to auscultation, no wheezing, rales or rhonchi    
Back: No bruise or rash to his back.  There is no palpable tenderness in his   
lower back on either the right or left sides.    
GI: Soft and nontender    
Musculoskeletal: The patient has no evidence of calf tenderness, no pitting   
edema, symmetrical pulses noted bilaterally    
Neurological: Awake and alert    
Psychiatric:  Cooperative    
Constitutional    
Vital Signs, click to edit/add:     
    
                                Last Vital Signs    
    
    
    
Temp  98.9 F   10/04/24 08:34    
     
Pulse  97 H  10/04/24 08:34    
     
Resp  16   10/04/24 08:34    
     
BP  177/80 H  10/04/24 08:34    
     
Pulse Ox  96   10/04/24 08:34    
     
O2 Del Method  Room Air  10/04/24 08:34    
    
    
    
    
    
Course    
Vital Signs    
Vital signs:     
    
                                   Vital Signs    
    
    
    
Temperature  98.9 F   10/04/24 08:34    
     
Pulse Rate  97 H  10/04/24 08:34    
     
Respiratory Rate  16   10/04/24 08:34    
     
Blood Pressure  177/80 H  10/04/24 08:34    
     
Pulse Oximetry  96   10/04/24 08:34    
     
Oxygen Delivery Method  Room Air  10/04/24 08:34    
    
    
                                            
    
    
    
Temperature  98.9 F   10/04/24 08:34    
     
Pulse Rate  97 H  10/04/24 08:34    
     
Respiratory Rate  16   10/04/24 08:34    
     
Blood Pressure  177/80 H  10/04/24 08:34    
     
Pulse Oximetry  96   10/04/24 08:34    
     
Oxygen Delivery Method  Room Air  10/04/24 08:34    
    
    
    
    
    
Medical Decision Making    
MDM Narrative    
Medical decision making narrative:     
Blood work, urinalysis, and CAT scan are all negative.  No evidence of kidney   
stone or UTI.  He will be treated symptomatically.  He had been lifting a heavy   
bed before this started and it is most likely that he has lumbar strain.    
Treatment diagnosis and follow-up were discussed with the patient.    
Differential Diagnosis    
Differential Diagnosis: Kidney stone, lumbar strain, UTI    
Lab Data    
Lab results reviewed: Yes I reviewed the patient's lab results    
Labs:     
    
                                   Lab Results    
    
    
    
  10/04/24 10/04/24 Range/Units    
    
  08:39 08:45     
     
WBC  11.4 H   (4.0-11.0)  10^3/uL    
     
RBC  5.56   (4.70-6.10)  10^6/uL    
     
Hgb  17.2   (14.0-18.0)  g/dL    
     
Hct  49.8   (42.0-54.0)  %    
     
MCV  89.6   (80.0-94.0)  fL    
     
MCH  30.9   (25.9-34.0)  pg    
     
MCHC  34.5   (29.9-35.2)  g/dL    
     
RDW  13.2   (11.0-15.0)  %    
     
Plt Count  318   (150-450)  10^3/uL    
     
MPV  8.7 L   (9.5-13.5)  fL    
     
Neut % (Auto)  59.5   (43.0-75.0)  %    
     
Lymph % (Auto)  28.9   (20.5-60.0)  %    
     
Mono % (Auto)  6.3   (1.7-12.0)  %    
     
Eos % (Auto)  4.3   (0.9-7.0)  %    
     
Baso % (Auto)  0.6   (0.2-2.0)  %    
     
Neut # (Auto)  6.8 H   (1.4-6.5)  10^3/uL    
     
Lymph # (Auto)  3.3   (1.2-3.8)  10^3/uL    
     
Mono # (Auto)  0.7   (0.3-0.8)  10^3/uL    
     
Eos # (Auto)  0.5   (0.0-0.7)  10^3/uL    
     
Baso # (Auto)  0.1   (0.0-0.1)  10^3/uL    
     
Abs Immat Gran (auto)  0.04 H   (0.00-0.03)  10^3/uL    
     
Imm/Tot Granulo (auto)  0.4   (0.0-0.5)  %    
     
Sodium  136   (136-145)  mmol/L    
     
Potassium  3.8   (3.5-5.1)  mmol/L    
     
Chloride  102   ()  mmol/L    
     
Carbon Dioxide  26.2   (21.0-32.0)  mmol/L    
     
Anion Gap  11.6       
     
BUN  9.0   (7.0-18.0)  mg/dL    
     
Creatinine  1.07   (0.70-1.30)  mg/dL    
     
Est GFR ( Amer)  >60   (>=60 mL/min/1.73m^2)      
     
Est GFR (Non-Af Amer)  >60   (>=60 mL/min/1.73m^2)      
     
BUN/Creatinine Ratio  8.4       
     
Glucose  128 H   ()  mg/dL    
     
Calcium  9.2   (8.5-10.1)  mg/dL    
     
Urine Color   Yellow  (YELLOW)      
     
Urine Clarity   Clear  (CLEAR)      
     
Urine pH   5.5  (5.0-9.0)      
     
Ur Specific Gravity   1.025  (1.005-1.025)      
     
Urine Protein   Negative  (NEG/TRACE)  mg/dL    
     
Urine Glucose (UA)   Negative  (NEGATIVE)  mg/dL    
     
Urine Ketones   Negative  (NEGATIVE)  mg/dL    
     
Urine Occult Blood   Trace-i  (NEGATIVE)      
     
Urine Nitrite   Negative  (NEGATIVE)      
     
Urine Bilirubin   Negative  (NEGATIVE)      
     
Urine Urobilinogen   0.2  (0.2-1.0)  EU/dL    
     
Ur Leukocyte Esterase   Negative  (NEGATIVE)      
     
Urine RBC   2-5 A  (0-2)  #/HPF    
     
Urine WBC   0-2 A  (NONE SEEN)  #/HPF    
     
Ur Squamous Epith Cells   Few A  (NONE/RARE)  #/LPF    
     
Urine Crystals   None seen  (None Seen)  #/HPF    
     
Urine Bacteria   Trace A  (NONE SEEN)  #/HPF    
     
Urine Casts   None seen  (NONE SEEN)  #/LPF    
     
Urine Mucus   Trace A  (NONE SEEN)      
    
    
    
    
Imaging Data    
CT scan - abdomen:     
      Radiologist's impression:     
    
ITS Impressions    
    
Abdomen/Pelvis CT  10/04/24 09:05    
IMPRESSION:     
     
1. No urinary tract calculi, obstructive uropathy, or suspicious findings.    
2. Unremarkable bowel.    
3. Tiny bilateral fat filled inguinal hernias of questionable clinical     
significance.    
     
     
Electronically authenticated by: ARIE STROUD   Date: 10/04/2024  09:24    
    
    
    
    
    
Discharge Plan    
Discharge    
Chief Complaint: Abdominal Pain    
    
Clinical Impression:    
 Lumbar strain    
    
    
Patient Disposition: Home, Self-Care    
    
Time of Disposition Decision: 09:32    
    
Condition: Good    
    
Mode of Transportation: Private Vehicle    
    
Prescriptions / Home Meds:    
New    
  ibuprofen 800 mg tablet     
   800 mg PO Q8H PRN (Reason: pain) Qty: 20 0RF    
  methocarbamol 750 mg tablet     
   750 mg PO Q8H Qty: 20 0RF    
    
No Action    
  simvastatin 20 mg tablet     
   20 mg PO DAILY     
  methylprednisolone [Medrol (Caden)] 4 mg tablets,dose pack     
   See Rx Instructions  .ROUTE .COMPLEX Qty: 21 0RF    
   Rx Instructions:    
   Taper as directed    
  methocarbamol 750 mg tablet     
   750 mg PO TID PRN (Reason: pain) Qty: 20 0RF    
  brompheniramine-pseudoeph-DM [Bromfed DM] 2-30-10 mg/5 mL syrup     
   10 ml PO Q6H PRN (Reason: cold symptoms) Qty: 200 0RF    
  levetiracetam 100 mg/mL solution     
   750 mg PO BID     
  aspirin 81 mg capsule     
   81 mg PO DAILY     
    
Print Language: English    
    
Instructions:  Low Back Strain (ED)    
    
Referrals:    
BREONNA MCMULLEN [Primary Care Provider] - 1 week

## 2024-10-04 NOTE — ED.GENADUL1
HPI
HPI - General Adult
General
Chief complaint: Abdominal Pain
Stated complaint: LOWER BACK, LEFT FLANK PAIN
Time Seen by Provider: 10/04/24 08:38
Source: patient
Mode of arrival: walk-in
Limitations: no limitations
History of Present Illness
HPI narrative: 
44-year-old male presents to the emergency department for left lower back pain.  He has had for 3 days and there was no injury.  No dysuria or hematuria and he has never had a kidney stone.  He remembers lifting a heavy item before this started but 
there was no pain initially.
Related Data
Home Medications

?Medication ?Instructions ?Recorded ?Confirmed
aspirin 81 mg capsule 81 mg PO DAILY 09/23/23 01/16/24
levetiracetam 100 mg/mL oral 750 mg PO BID 09/23/23 01/16/24
solution   
simvastatin 20 mg tablet 20 mg PO DAILY 01/16/24 01/16/24

Previous Rx's

?Medication ?Instructions ?Recorded
brompheniramine-pseudoephedrine-DM 10 ml PO Q6H PRN cold symptoms 01/16/24
2 mg-30 mg-10 mg/5 mL oral syrup #200 mL 
(Bromfed DM)  
methocarbamol 750 mg tablet 750 mg PO TID PRN pain #20 tabs 01/16/24
methylprednisolone 4 mg tablets in See Rx Instructions .Route 01/16/24
a dose pack (Medrol (Caden)) .COMPLEX #21 ea 
ibuprofen 800 mg tablet 800 mg PO Q8H PRN pain #20 tabs 10/04/24
methocarbamol 750 mg tablet 750 mg PO Q8H #20 tabs 10/04/24


Allergies

Allergy/AdvReac Type Severity Reaction Status Date / Time
topiramate [From Topamax] AdvReac Severe suicidal Verified 10/29/23 12:40
diphenhydramine AdvReac Intermediate Hypertensio Verified 10/29/23 12:40
[From Benadryl]   n  



Opioid HPI
Opioid Management
Most Recent Opioid Data: 
      Last Pain Scale 0 09/23/23 11:25 


Review of Systems
ROS  
 Narrative A ten point review of systems is negative except as noted above.   

PFSH
[facility]
Medical History (Updated 10/04/24 @ 09:33 by Iggy Dias MD)

Epilepsy
 ?G40.909 - Epilepsy, unspecified, not intractable, without status epilepticus (ICD-10)


Social History (Reviewed 01/16/24 @ 16:05 by PONCE Ariza)
Smoking status:  Current every day smoker 
Little interest or pleasure in doing things:  not at all 
Feeling down, depressed, or hopeless:  not at all 



Exam
Narrative
Exam Narrative: 
Nurses note and vital signs reviewed and patient is not hypoxic.

General:  The patient appears well and in no apparent distress. 
Skin:  Warm, dry, no pallor noted.  There is no rash noted.
Head:  Normocephalic, atraumatic
Eye: Normal conjunctiva, no drainage
Ears, Nose, Mouth, and Throat: oral mucosa is moist. Nares patent.
Cardiovascular:  Regular Rate and Rhythm
Respiratory:  Patient is in no distress, no accessory muscle use, lungs are clear to auscultation, no wheezing, rales or rhonchi
Back: No bruise or rash to his back.  There is no palpable tenderness in his lower back on either the right or left sides.
GI: Soft and nontender
Musculoskeletal: The patient has no evidence of calf tenderness, no pitting edema, symmetrical pulses noted bilaterally
Neurological: Awake and alert
Psychiatric:  Cooperative
Constitutional
Vital Signs, click to edit/add: 

Last Vital Signs

Temp  98.9 F   10/04/24 08:34
Pulse  97 H  10/04/24 08:34
Resp  16   10/04/24 08:34
BP  177/80 H  10/04/24 08:34
Pulse Ox  96   10/04/24 08:34
O2 Del Method  Room Air  10/04/24 08:34




Course
Vital Signs
Vital signs: 

Vital Signs

Temperature  98.9 F   10/04/24 08:34
Pulse Rate  97 H  10/04/24 08:34
Respiratory Rate  16   10/04/24 08:34
Blood Pressure  177/80 H  10/04/24 08:34
Pulse Oximetry  96   10/04/24 08:34
Oxygen Delivery Method  Room Air  10/04/24 08:34



Temperature  98.9 F   10/04/24 08:34
Pulse Rate  97 H  10/04/24 08:34
Respiratory Rate  16   10/04/24 08:34
Blood Pressure  177/80 H  10/04/24 08:34
Pulse Oximetry  96   10/04/24 08:34
Oxygen Delivery Method  Room Air  10/04/24 08:34




Medical Decision Making
MDM Narrative
Medical decision making narrative: 
Blood work, urinalysis, and CAT scan are all negative.  No evidence of kidney stone or UTI.  He will be treated symptomatically.  He had been lifting a heavy bed before this started and it is most likely that he has lumbar strain.  Treatment 
diagnosis and follow-up were discussed with the patient.
Differential Diagnosis
Differential Diagnosis: Kidney stone, lumbar strain, UTI
Lab Data
Lab results reviewed: Yes I reviewed the patient's lab results
Labs: 

Lab Results

  10/04/24 10/04/24 Range/Units
  08:39 08:45 
WBC  11.4 H   (4.0-11.0)  10^3/uL
RBC  5.56   (4.70-6.10)  10^6/uL
Hgb  17.2   (14.0-18.0)  g/dL
Hct  49.8   (42.0-54.0)  %
MCV  89.6   (80.0-94.0)  fL
MCH  30.9   (25.9-34.0)  pg
MCHC  34.5   (29.9-35.2)  g/dL
RDW  13.2   (11.0-15.0)  %
Plt Count  318   (150-450)  10^3/uL
MPV  8.7 L   (9.5-13.5)  fL
Neut % (Auto)  59.5   (43.0-75.0)  %
Lymph % (Auto)  28.9   (20.5-60.0)  %
Mono % (Auto)  6.3   (1.7-12.0)  %
Eos % (Auto)  4.3   (0.9-7.0)  %
Baso % (Auto)  0.6   (0.2-2.0)  %
Neut # (Auto)  6.8 H   (1.4-6.5)  10^3/uL
Lymph # (Auto)  3.3   (1.2-3.8)  10^3/uL
Mono # (Auto)  0.7   (0.3-0.8)  10^3/uL
Eos # (Auto)  0.5   (0.0-0.7)  10^3/uL
Baso # (Auto)  0.1   (0.0-0.1)  10^3/uL
Abs Immat Gran (auto)  0.04 H   (0.00-0.03)  10^3/uL
Imm/Tot Granulo (auto)  0.4   (0.0-0.5)  %
Sodium  136   (136-145)  mmol/L
Potassium  3.8   (3.5-5.1)  mmol/L
Chloride  102   ()  mmol/L
Carbon Dioxide  26.2   (21.0-32.0)  mmol/L
Anion Gap  11.6   
BUN  9.0   (7.0-18.0)  mg/dL
Creatinine  1.07   (0.70-1.30)  mg/dL
Est GFR ( Amer)  >60   (>=60 mL/min/1.73m^2)  
Est GFR (Non-Af Amer)  >60   (>=60 mL/min/1.73m^2)  
BUN/Creatinine Ratio  8.4   
Glucose  128 H   ()  mg/dL
Calcium  9.2   (8.5-10.1)  mg/dL
Urine Color   Yellow  (YELLOW)  
Urine Clarity   Clear  (CLEAR)  
Urine pH   5.5  (5.0-9.0)  
Ur Specific Gravity   1.025  (1.005-1.025)  
Urine Protein   Negative  (NEG/TRACE)  mg/dL
Urine Glucose (UA)   Negative  (NEGATIVE)  mg/dL
Urine Ketones   Negative  (NEGATIVE)  mg/dL
Urine Occult Blood   Trace-i  (NEGATIVE)  
Urine Nitrite   Negative  (NEGATIVE)  
Urine Bilirubin   Negative  (NEGATIVE)  
Urine Urobilinogen   0.2  (0.2-1.0)  EU/dL
Ur Leukocyte Esterase   Negative  (NEGATIVE)  
Urine RBC   2-5 A  (0-2)  #/HPF
Urine WBC   0-2 A  (NONE SEEN)  #/HPF
Ur Squamous Epith Cells   Few A  (NONE/RARE)  #/LPF
Urine Crystals   None seen  (None Seen)  #/HPF
Urine Bacteria   Trace A  (NONE SEEN)  #/HPF
Urine Casts   None seen  (NONE SEEN)  #/LPF
Urine Mucus   Trace A  (NONE SEEN)  



Imaging Data
CT scan - abdomen: 
      Radiologist's impression: 

ITS Impressions

Abdomen/Pelvis CT  10/04/24 09:05
IMPRESSION: 
 
1. No urinary tract calculi, obstructive uropathy, or suspicious findings.
2. Unremarkable bowel.
3. Tiny bilateral fat filled inguinal hernias of questionable clinical 
significance.
 
 
Electronically authenticated by: ARIE STROUD   Date: 10/04/2024  09:24





Discharge Plan
Discharge
Chief Complaint: Abdominal Pain

Clinical Impression:
 Lumbar strain


Patient Disposition: Home, Self-Care

Time of Disposition Decision: 09:32

Condition: Good

Mode of Transportation: Private Vehicle

Prescriptions / Home Meds:
New
  ibuprofen 800 mg tablet 
   800 mg PO Q8H PRN (Reason: pain) Qty: 20 0RF
  methocarbamol 750 mg tablet 
   750 mg PO Q8H Qty: 20 0RF

No Action
  simvastatin 20 mg tablet 
   20 mg PO DAILY 
  methylprednisolone [Medrol (Caden)] 4 mg tablets,dose pack 
   See Rx Instructions  .ROUTE .COMPLEX Qty: 21 0RF
   Rx Instructions:
   Taper as directed
  methocarbamol 750 mg tablet 
   750 mg PO TID PRN (Reason: pain) Qty: 20 0RF
  brompheniramine-pseudoeph-DM [Bromfed DM] 2-30-10 mg/5 mL syrup 
   10 ml PO Q6H PRN (Reason: cold symptoms) Qty: 200 0RF
  levetiracetam 100 mg/mL solution 
   750 mg PO BID 
  aspirin 81 mg capsule 
   81 mg PO DAILY 

Print Language: English

Instructions:  Low Back Strain (ED)

Referrals:
BREONNA MCMULLEN [Primary Care Provider] - 1 week

## 2024-10-04 NOTE — XMS_ITS
Comprehensive CCD (C-CDA v2.1)  
  
                           Created on: 2024  
  
  
NATANAEL GANDHI  
External Reference #: [id number],PersonID:[id number]  
: 1980  
Sex: Male  
  
Demographics  
  
  
                                        Address             [address]  [place]  [address]  
   
                                        Home Phone          [phone]  
   
                                        Home Phone          [phone]  
   
                                        Work Phone          [phone]  
   
                                        Home Phone          [phone]  
   
                                        Home Phone          [phone]  
   
                                        Preferred Language  en  
   
                                        Marital Status      Single  
   
                                        Jain Affiliation Unknown  
   
                                        Race                White  
   
                                        Ethnic Group        Not  or Lati  
no  
  
  
Author  
  
  
                                        Organization        [facility]  
  
  
Care Team Providers  
  
  
                                Care Team Member Name Role            Phone  
   
                                Breonna Mcmullen  Unavailable     [phone]  
   
                                Unavailable     Unavailable     [phone]  
   
                                Yvrose Kc   Unavailable     [phone]  
   
                                DEMI PAYNE Attending       Unavailable  
   
                                BREONNA MCMULLEN  Primary Care    Unavailable  
   
                                Debra, Dr. Booker Attending       Unavaila  
amanda Richardson, Dr. Booker Referring       Unavaila  
Breonna Perea Primary Care    Unavailable  
   
                                KEMI, DR RAVI Admitting       Unavailable  
   
                                KEMI, DR RAVI Attending       Unavailable  
   
                                KEMI, DR RAVI Primary Care    Unavailable  
   
                                AMINA GONZALEZ Attending       Unavailable  
   
                                KEMI, DR RAVI Primary Care    Unavailable  
   
                                AMINA GONZALEZ Consulting      Unavailable  
   
                                AMINA GONZALEZ Admitting       Unavailable  
   
                                TUTU MCCLOUD Admitting       Unavailable  
   
                                MAXIMUS, DR EDILBERTO GALE Consulting      Unavailable  
   
                                [facility] [place] [facility]    Unavailable  
   
                                TUTU MCCLOUD Attending       Unavailable  
   
                                TUTU MCCLOUD Consulting      Unavailable  
   
                                KEMI, DR RAVI Primary Care    Unavailable  
   
                                TUTU MCCLOUD Admitting       Unavailable  
   
                                TUTU MCCLOUD Attending       Unavailable  
   
                                KATERIN .BERNARDINO    Consulting      Unavailable  
   
                                KEMI, DR RAVI Primary Care    Unavailable  
   
                                DIAB ., STACY  Admitting       Unavailable  
   
                                DIAB ., STACY  Attending       Unavailable  
   
                                DIAB ., STACY  Consulting      Unavailable  
   
                                GEOVANNI LIMDA  Consulting      Unavailable  
   
                                EUGENIA, DR NATANAEL COSTA Admitting       Unavailreno MCMULLEN, DR RAVI Primary Care    Unavailable  
   
                                DR NATANAEL BELLO Attending       UnavailTORI Alfaro Consulting      Unavailable  
   
                                TORI STRINGER  Consulting      Unavailable  
   
                                Sangita Moscoso   Unavailable     [phone]  
   
                                MD Breonna Mcmullen Primary Care Provider [phone]  
   
                                ARNOL White Attending Provider [phone]  
   
                                Kary White Unavailable     [phone]  
   
                                Isreal Bautista    Unavailable     [phone]  
   
                                Kary White Attending       Unavailable  
   
                                Kary White Admitting       Unavailable  
   
                                Breonna Mcmullen    Primary Care    Unavailable  
   
                                KARY BENITEZ Referring       Unavailable  
   
                                BREONNA MCMULLEN  Attending       Unavailable  
   
                                BREONNA MCMULLEN  Referring       Unavailable  
   
                                DIDIONKARY Attending       Unavailable  
   
                                HILL, BREONNA LENZ  Referring       Unavailable  
   
                                DIDIONKARY Attending       Unavailable  
  
  
  
Allergies  
  
  
                                                    Allergy   
Classification                          Reported   
Allergen(s)                             Allergy   
Type                                    Date of   
Onset                     Reaction(s)               Facility  
   
                                                      
(5 sources)                             diphenhydrAMINE;   
Translations:   
[Benadryl TABS]                         Drug   
Allergy                                             Hypertension,   
hyperactivity                           [facility]   
[facility]   
250 DO  
Work Phone:   
[phone]  
   
                                                      
(1 source)                              topiramate;   
Translations:   
[Topiragen TABS]                        Drug   
Allergy                                 Depression          [facility]   
250 DO  
Work Phone:   
[phone]  
   
                                                      
(2 sources)                             diphenhydrAMINE;   
Translations:   
[Benadryl]                              Drug   
Allergy                                 hyperactivity       [facility]   
Repository  
   
                                                      
(7 sources)               topiramate                Drug   
Allergy                                 20  
24                        suicidal                  [facility]  
   
                                                      
(5 sources)               Bee Sting                 Drug   
allergy                                 Unknown             [facility]   
[facility]   
[facility]  
Other Phone:   
[phone]  
   
                                                      
(1 source)                predniSONE                Drug   
Allergy                                 20  
23                                                  [facility]   
Repository  
   
                                                      
(1 source)                topiramate                Drug   
Allergy                                                     [facility]   
Repository  
   
                                                      
(3 sources)               diphenhydrAMINE           Drug   
Allergy                                 01-15-20  
24                        [facility]   
Repository  
   
                                                      
(1 source)                topiramate                Drug   
Allergy                                 01-15-20  
24                                                  [facility]   
Repository  
   
                                                      
(3 sources)                             bee venom protein   
(honey bee)                             Drug   
allergy   
(disorder)                              01-15-20  
24                        Anaphylaxis               [facility]   
Repository  
  
  
  
Medications  
Current Medications  
  
  
  
                      Medication Drug Class(es) Dates      Sig (Normalized) Sig   
(Original)  
   
                                                    Aspir-81  
(5 sources)                                                     Aspir-81 Active  
   
                                                    aspirin 81 mg   
delayed release   
oral tablet  
(3 sources)                             Platelet Aggregation   
Inhibitor,   
Nonsteroidal   
Anti-inflammatory   
Drug                                    Start:   
2024                                          Aspirin (Adult   
Low Dose Aspirin)   
81 mg   
tablet,delayed   
release (DR/EC)   
Active 81 MG PO   
Daily 2024 1:00am  
  
  
  
                                                take 1 tablet by mouth once aura  
y Aspirin EC 81 MG Oral Tablet Delayed Release   
TAKE   
1 TABLET DAILY. Quantity: 90 Refills: 3 Ordered:   
2022 TraboulJhony cheung MD Active fill   
only if requested  
  
  
  
                                                    levETIRAcetam 100 mg/ml   
oral solution  
(12 sources)                            Start: 2024   take 7.5 mL by   
mouth every   
twelve hours                            Levetiracetam Active 7.5   
ML PO Every 12 hours   
2024   
1:00am FreeTextSi.5   
ml Orally every 12 hrs;   
Note: Source Status:   
Not-Taking\PRN; Provider:   
Michele Bach (NPI:   
[id number])  
  
  
  
                                                            take 7.5 mL by mouth  
 every twelve   
hours as needed                         Keppra 100 MG/ML 7.5 ml Orally every 12   
hrs   
for 30 day(s) Not-Taking/PRN  
   
                                                                levETIRAcetam Ac  
tive  
  
  
  
                                                    naproxen 500 mg oral   
tablet  
(2 sources)                             Nonsteroidal   
Anti-inflammatory Drug                  Start:   
2024                              take 500 mg   
by mouth   
twice daily                             Naproxen Active   
500 MG PO Twice   
daily 14 7   
2024 1:00am  
   
                                                    ondansetron 4 mg   
disintegrating oral   
tablet  
(1 source)                              Serotonin-3 Receptor   
Antagonist                              Start:   
2024                              take 4 mg by   
mouth every   
eight hours                             Ondansetron   
Active 4 MG PO   
Every 8 hours 10   
4 2024 12:00am  
   
                                                    simvastatin 20 mg   
oral tablet  
(8 sources)                             HMG-CoA Reductase   
Inhibitor                               Start:   
2024                              take 1   
tablet by   
mouth once   
daily in the   
evening                                 Simvastatin   
Active 1 TAB PO   
Daily 2024   
1:00am   
FreeTextSi   
tablet in the   
evening Orally   
Once a day;   
Note: Source   
Status: Taking;   
Provider:   
Michele Bach   
(NPI:   
[id number])  
  
  
  
                                        Start: 2021   take 1 tablet by chandra  
th once   
daily in the evening                    Simvastatin 20 MG Oral Tablet TAKE   
ONE TABLET BY MOUTH ONCE DAILY IN   
THE EVENING Quantity: 90 Refills: 3   
Ordered: 2022 Jhony Richardson MD Start : 2-Aug-2021 Active  
  
  
  
Completed/Discontinued Medications  
  
  
  
                      Medication Drug Class(es) Dates      Sig (Normalized) Sig   
(Original)  
   
                                                    brompheniramine   
maleate 0.4 mg/ml /   
dextromethorphan   
hydrobromide 2 mg/ml /   
pseudoephedrine   
hydrochloride 6 mg/ml   
oral solution  
(4 sources)                             alpha-Adrenergic   
Agonist,   
Uncompetitive   
N-methyl-D-aspartat  
e Receptor   
Antagonist, Sigma-1   
Agonist                                 Start:   
2023                              take 10 mL by   
mouth every six   
hours as needed                         Pseudoeph-Bromp  
hen-DM 30-2-10   
MG/5ML 10 mL   
Orally every 6   
hours for 5   
days 12 Sep,   
2023   
Not-Taking/PRN  
   
                                                    loratadine 1 mg/ml   
oral solution  
(5 sources)                                                 take 10 mL by   
mouth once daily   
as needed                               Claritin 5   
MG/5ML 10 ml   
Orally Once a   
day for 30   
day(s)   
Not-Taking/PRN  
  
  
  
                                                take 10 mL by mouth once daily C  
laritin 5 MG/5ML 10 ml Orally Once a day for 30  
   
day(s) Active  
  
  
  
                                                    predniSONE 20 mg oral   
tablet  
(4 sources)                             Start: 2023   take 1 tablet by   
mouth every twelve   
hours                                   prednisone 20 MG 1 tablet   
Orally BID for 5 12 Sep,   
2023 Not-Taking/PRN  
  
  
  
Problems  
Active Problems  
  
  
                      Problem Classification Problem    Date       Documented Da  
te Episodic/Chronic  
   
                                                    Acquired foot   
deformities  
(1 source)                              Other deformities   
of toe(s)   
(acquired), right   
foot                                                        Episodic  
   
                                                    Acquired foot   
deformities  
(1 source)                              Other deformities   
of toe(s)   
(acquired), left   
foot                                                        Episodic  
   
                                                    Cardiac dysrhythmias  
(1 source)                              Paroxysmal atrial   
fibrillation;   
Translations:   
[Atrial   
fibrillation]                                               Chronic  
   
                                                    Cardiac dysrhythmias  
(2 sources)                             Palpitations;   
Translations:   
[Palpitations]                                              Episodic  
   
                                                    Crushing injury or   
internal injury  
(4 sources)                             Crushing injury of   
left ring finger,   
initial encounter;   
Translations:   
[CRUSHING INJURY LT   
RING FINGER INIT]                       Onset:   
2023                                          Episodic  
   
                                                    Disorders of lipid   
metabolism  
(1 source)                              Hypertriglyceridemi  
a; Translations:   
[Pure   
hyperglyceridemia]                                          Chronic  
   
                                                    E Codes: Other   
specified and   
classifiable  
(1 source)                              Caught, crushed,   
jammed, or pinched   
between moving   
objects, initial   
encounter;   
Translations:   
[CAUGHT CRUSH/PINCH   
BTWN MOV OBJ INT]                       Onset:   
2023                                          Episodic  
   
                                                    Epilepsy; convulsions  
(1 source)                              Seizure disorder;   
Translations:   
[Epilepsy,   
unspecified,   
without mention of   
intractable   
epilepsy]                                                   Chronic  
   
                                                    Influenza  
(1 source)                              Influenza due to   
other identified   
influenza virus   
with other   
respiratory   
manifestations;   
Translations: [FLU   
D/T OTH ID FLU VIR   
OTH RSP MANF]                           Onset:   
2022                                          Episodic  
   
                                                    Other acquired   
deformities  
(1 source)                              Contracture,   
unspecified foot;   
Translations:   
[CONTRACTURE   
UNSPECIFIED FOOT]                       Onset:   
2022                                          Chronic  
   
                                                    Other aftercare  
(1 source)                              Long term (current)   
use of aspirin;   
Translations: [LONG   
TERM CURRENT USE OF   
ASPIRIN]                                Onset:   
2022                                          Episodic  
   
                                                    Other aftercare  
(1 source)                              Other long term   
(current) drug   
therapy;   
Translations: [OTH   
LONG TERM CURRENT   
DRUG THERAPY]                           Onset:   
2022                                          Episodic  
   
                                                    Other lower   
respiratory disease  
(1 source)                              Dyspnea;   
Translations:   
[Shortness of   
breath]                                                     Episodic  
   
                                                    Other nervous system   
disorders  
(2 sources)                             Chronic pain;   
Translations:   
[Other chronic   
pain]                                                       Chronic  
   
                                                    Other nervous system   
disorders  
(1 source)      Other chronic pain                                 Chronic  
   
                                                    Other nutritional;   
endocrine; and   
metabolic disorders  
(1 source)                              Obesity;   
Translations:   
[Obesity,   
unspecified]                                                Chronic  
   
                                                    Other upper   
respiratory infections  
(2 sources)                             Acute pharyngitis,   
unspecified;   
Translations:   
[Acute upper   
respiratory   
infection,   
unspecified]                                                Episodic  
   
                                                    Residual codes;   
unclassified  
(1 source)                              Sleep apnea;   
Translations:   
[Unspecified sleep   
apnea]                                                      Chronic  
   
                                                    Residual codes;   
unclassified  
(5 sources)                             Obstructive sleep   
apnea syndrome;   
Translations:   
[Obstructive sleep   
apnea (adult)   
(pediatric)]                                                Chronic  
   
                                                    Residual codes;   
unclassified  
(1 source)                              Obstructive sleep   
apnea (adult)   
(pediatric);   
Translations:   
[Obstructive sleep   
apnea G47.33]                           Onset:   
2021  
Resolved:   
2021                                          Chronic  
   
                                                    Spondylosis;   
intervertebral disc   
disorders; other back   
problems  
(5 sources)                             Inflammation of   
sacroiliac joint;   
Translations:   
[Sacroiliitis, not   
elsewhere   
classified]                                                 Chronic  
   
                                                    Spondylosis;   
intervertebral disc   
disorders; other back   
problems  
(4 sources)                             Sciatica, right   
side; Translations:   
[Radiculopathy,   
lumbar region]                          Onset:   
2022                                          Episodic  
   
                                                    Sprains and strains  
(6 sources)                             Lumbosacral strain;   
Translations:   
[Lumbosacral   
strain]                                 Onset:   
2022                                          Episodic  
   
                                                    Substance-related   
disorders  
(2 sources)                             Occasional tobacco   
smoker;   
Translations:   
[Tobacco use   
disorder]                               Onset:   
2022                                          Chronic  
   
                                                    Superficial injury;   
contusion  
(1 source)                              Contusion of left   
ring finger with   
damage to nail,   
initial encounter;   
Translations:   
[CONTUS LT RING   
FINGR DAMGE NAIL   
INT]                                    Onset:   
2023                                          Episodic  
   
                                                    Unclassified  
(2 sources)                             COUGH, UNSPECIFIED;   
Translations:   
[COUGH,   
UNSPECIFIED]                            Onset:   
2022                                            
   
                                                    Unclassified  
(1 source)                              CONTACT W/AND   
(SUSP) EXPOS   
COVID-19;   
Translations:   
[CONTACT W/AND   
(SUSP) EXPOS   
COVID-19]                               Onset:   
2022                                            
  
  
Past or Other Problems  
  
  
                                                    Problem   
Classification  Problem         Date            Documented Date Episodic/Chronic  
   
                                                    E Codes:   
Natural/environment  
(1 source)                              Other and   
unspecified   
overexertion or   
strenuous movements   
or postures, initial   
encounter;   
Translations: [OTH   
AND UNS OVREXRT/STRN   
MVMT/POS INT]                           Onset:   
2022                                          Episodic  
   
                                                    E Codes: Unspecified  
(1 source)                              Activity, digging,   
shoveling and   
raking;   
Translations:   
[ACTIVITY DIGGING   
SHOVELING AND   
RAKING]                                 Onset:   
2022                                          Episodic  
   
                                                    Malaise and fatigue  
(1 source)                              Weakness;   
Translations:   
[WEAKNESS]                              Onset:   
2022                                          Episodic  
   
                                                    Other connective   
tissue disease  
(4 sources)                             Plantar fascial   
fibromatosis;   
Translations:   
[PLANTAR FASCIAL   
FIBROMATOSIS]                           Onset:   
2022                                          Episodic  
   
                                                    Other connective   
tissue disease  
(1 source)                              Pain in right foot;   
Translations: [PAIN   
IN RIGHT FOOT]                          Onset:   
2022                                          Episodic  
   
                                                    Other connective   
tissue disease  
(1 source)                              Pain in left foot;   
Translations: [PAIN   
IN LEFT FOOT]                           Onset:   
2022                                          Episodic  
   
                                                    Other nervous system   
disorders  
(1 source)                              Unspecified   
abnormalities of   
gait and mobility;   
Translations: [UNS   
ABNORMALITIES GAIT   
AND MOBILITY]                           Onset:   
2022                                          Episodic  
   
                                                    Other non-traumatic   
joint disorders  
(4 sources)                             Pain in right   
shoulder;   
Translations: [PAIN   
IN RIGHT SHOULDER]                      Onset:   
2022                                          Episodic  
   
                                                    Other non-traumatic   
joint disorders  
(4 sources)                             Pain in right ankle   
and joints of right   
foot; Translations:   
[PAIN IN RIGHT   
ANKLE]                                  Onset:   
2022                                          Episodic  
   
                                                    Other non-traumatic   
joint disorders  
(1 source)                              Pain in left ankle   
and joints of left   
foot; Translations:   
[PAIN IN LEFT ANKLE]                    Onset:   
2022                                          Episodic  
   
                                                    Other nutritional;   
endocrine; and   
metabolic disorders  
(1 source)                              Body mass index 30+   
- obesity;   
Translations:   
[Obesity,   
unspecified]                              
Resolved:   
2022                                          Chronic  
   
                                                    Screening and history   
of mental health and   
substance abuse codes  
(2 sources)                             Ex-smoker;   
Translations:   
[Personal history of   
tobacco use]                            Onset:   
2022                                          Episodic  
   
                                                    Unclassified  
(1 source)                              COUGH, UNSPECIFIED;   
Translations:   
[COUGH, UNSPECIFIED]                    Onset:   
2022                                            
  
  
  
Results  
  
  
                          Test Name    Value        Interpretation Reference   
Range                                   Facility  
   
                                                    XR lumbar spine 6V w bending  
on 2023   
   
                                                    XR lumbar spine 6V   
w bending                               [facility]  
[address] [place] [id number]  
  
XRay Report  
Signed  
  
Patient:   
Natanael Gandhi   
MR#: [id number]  
[id number]  
: 1980   
Acct:[id number]  
  
Age/Sex: 43 / M ADM   
Date: 23  
  
Loc: XD Room: Type:   
[facility]  
Attending Dr:   
Kary AMBROSE  
Copies to: ARNOL Dougherty  
  
  
  
Ordering Provider:   
ARNOL Dougherty  
Date of Service:   
23  
Accession #:   
([id number]) XR/XR   
lumbar spine 6V w   
bending: M54.16  
  
  
  
  
XR lumbar spine 6V w   
bending 2023   
11:49 AM  
  
SIGNS AND SYMPTOMS:   
Leg pain  
  
PROTOCOLS: Frontal,   
lateral, and   
flexion-extension   
views of the lumbar   
spine.  
  
COMPARISON: None  
  
FINDINGS:  
  
There is 8   
millimeters of   
anterolisthesis of   
L4 upon L5 with   
moderate disc height   
loss. Flexion and  
extension view show   
no pathologic   
movement. There is   
mild disc height   
loss at T11-T12   
T12-L1, L2-L3,   
and L3-L4. Facet   
degenerative changes   
are present   
throughout. This is   
greatest at L4-5.  
  
The sacrum and   
sacroiliac joints   
are normal.  
  
Atherosclerotic   
changes are present   
in the abdominal   
aorta.  
  
  
ORDER #: [id number]   
XR/XR lumbar spine   
6V w bending  
IMPRESSION:  
  
There is 8   
millimeters of   
anterolisthesis of   
L4 upon L5 with   
moderate disc height   
loss.  
  
No pathologic   
movement on flexion   
or extension.  
  
Multilevel   
degenerative changes   
noted, as above.  
  
Impression dictated   
by: Lasha Guerrero M.D.2023 3:17   
PM  
  
  
Dictation Location:   
[place] [address]  
  
  
  
Transcribed By: [facility]   
23  
Dictated By: Lasha Guerrero II, MD   
23  
  
Signed By:  
23       Normal                                  [facility]  
   
                                                    XR lumbar spine 6V   
w bending                               [facility]   
[facility]   
[facility]  
Other Phone:   
[phone]  
   
                                                    XR lumbar spine 6V   
w bending       [facility]                                 [place]   
[facility]   
[facility]  
Other Phone:   
[phone]  
   
                                                    XR lumbar spine 6V   
w bending       [address] [facility]                                 [place]   
[facility]   
[facility]  
Other Phone:   
[phone]  
   
                                                    XR lumbar spine 6V   
[place]       [place] [address]                                 [place]   
[facility]  
Other Phone:   
[phone]  
   
                                                    XR lumbar spine 6V   
w bending       XRay Report                                     [place]   
[facility]  
Other Phone:   
[phone]  
   
                                                    XR lumbar spine 6V   
w bending       Signed                                          [place]   
[facility]  
Other Phone:   
[phone]  
   
                                                    XR lumbar spine 6V   
w bending                               Patient:   
Natanael Gandhi   
MR#: [id number]                                                   [facility] [place]   
[facility]  
Other Phone:   
[phone]  
   
                                                    XR lumbar spine 6V   
w bending       [id number]                                          [place]   
[facility]  
Other Phone:   
[phone]  
   
                                                    XR lumbar spine 6V   
w bending                               : 1980   
Acct:[id number]                                             [place]   
[facility]  
Other Phone:   
[phone]  
   
                                                    XR lumbar spine 6V   
w bending                               Age/Sex: 43 / M ADM   
Date: 23                                              [place]   
[facility]  
Other Phone:   
[phone]  
   
                                                    XR lumbar spine 6V   
w bending                               Loc: XD Room: Type:   
REG CLI                                                     [place]   
[facility]  
Other Phone:   
[phone]  
   
                                                    XR lumbar spine 6V   
w bending                               Attending Dr:   
Kary AMBROSE                                                        [place]   
[facility]  
Other Phone:   
[phone]  
   
                                                    XR lumbar spine 6V   
w bending                               Copies to: ARNOL Dougherty                                              [place]   
[facility]  
Other Phone:   
[phone]  
   
                                                    XR lumbar spine 6V   
w bending                               Ordering Provider:   
ARNOL Dougherty                                                        [place]   
[facility]  
Other Phone:   
[phone]  
   
                                                    XR lumbar spine 6V   
w bending                               Date of Service:   
23                                                    [place]   
[facility]  
Other Phone:   
[phone]  
   
                                                    XR lumbar spine 6V   
w bending                               Accession #:   
([id number]) XR/XR   
lumbar spine 6V w   
bending: M54.16                                             [facility]  
Other Phone:   
[phone]  
   
                                                    XR lumbar spine 6V   
w bending                               XR lumbar spine 6V w   
bending 2023   
11:49 AM                                                    [facility] [place]   
[facility]  
Other Phone:   
[phone]  
   
                                                    XR lumbar spine 6V   
w bending                               SIGNS AND SYMPTOMS:   
Leg pain                                                    [facility] [place]   
[facility]  
Other Phone:   
[phone]  
   
                                                    XR lumbar spine 6V   
w bending                               PROTOCOLS: Frontal,   
lateral, and   
flexion-extension   
views of the lumbar   
spine.                                                      [facility] [place]   
[facility]  
Other Phone:   
[phone]  
   
                                                    XR lumbar spine 6V   
w bending       COMPARISON: None                                 [place]   
[facility]  
Other Phone:   
[phone]  
   
                                                    XR lumbar spine 6V   
w bending       FINDINGS:                                       [facility] [place]   
[facility]  
Other Phone:   
[phone]  
   
                                                    XR lumbar spine 6V   
w bending                               There is 8   
millimeters of   
anterolisthesis of   
L4 upon L5 with   
moderate disc height   
loss. Flexion and                                           [facility] [place]   
[facility]  
Other Phone:   
[phone]  
   
                                                    XR lumbar spine 6V   
w bending                               extension view show   
no pathologic   
movement. There is   
mild disc height   
loss at T11-T12   
T12-L1, L2-L3,                                              [facility] [place]   
[facility]  
Other Phone:   
[phone]  
   
                                                    XR lumbar spine 6V   
w bending                               and L3-L4. Facet   
degenerative changes   
are present   
throughout. This is   
greatest at L4-5.                                           [facility]  
Other Phone:   
[phone]  
   
                                                    XR lumbar spine 6V   
w bending                               The sacrum and   
sacroiliac joints   
are normal.                                                 [facility] [place]   
[facility]  
Other Phone:   
[phone]  
   
                                                    XR lumbar spine 6V   
w bending                               Atherosclerotic   
changes are present   
in the abdominal   
aorta.                                                      [facility] [place]   
[facility]  
Other Phone:   
[phone]  
   
                                                    XR lumbar spine 6V   
w bending                               ORDER #: [id number]   
XR/XR lumbar spine   
6V w bending                                                [facility]  
Other Phone:   
[phone]  
   
                                                    XR lumbar spine 6V   
w bending       IMPRESSION:                                     [facility] [place]   
[facility]  
Other Phone:   
[phone]  
   
                                                    XR lumbar spine 6V   
w bending                               There is 8   
millimeters of   
anterolisthesis of   
L4 upon L5 with   
moderate disc height   
loss.                                                       [facility]  
Other Phone:   
[phone]  
   
                                                    XR lumbar spine 6V   
w bending                               No pathologic   
movement on flexion   
or extension.                                               [facility] [place]   
[facility]  
Other Phone:   
[phone]  
   
                                                    XR lumbar spine 6V   
w bending                               Multilevel   
degenerative changes   
noted, as above.                                            [facility] [place]   
[facility]  
Other Phone:   
[phone]  
   
                                                    XR lumbar spine 6V   
w bending                               Impression dictated   
by: Lasha Guerrero M.D.2023 3:17   
PM                                                          [facility] [place]   
[facility]  
Other Phone:   
[phone]  
   
                                                    XR lumbar spine 6V   
w bending                               Dictation Location:   
[place] [address]                                                 [place]   
[facility]  
Other Phone:   
[phone]  
   
                                                    XR lumbar spine 6V   
w bending                               Transcribed By: FELIX   
23 [facility]                                               [place]   
[facility]  
Other Phone:   
[phone]  
   
                                                    XR lumbar spine 6V   
w bending                               Dictated By: Lasha Guerrero II, MD   
23 [facility]                                               [place]   
[facility]  
Other Phone:   
[phone]  
   
                                                    XR lumbar spine 6V   
w bending       Signed By:                                      [place]   
[facility]  
Other Phone:   
[phone]  
   
                                                    XR lumbar spine 6V   
w bending       23 [facility]                                   [place]   
[facility]  
Other Phone:   
[phone]  
   
                                                    MR LUMBAR SPINE WO CONTRASTo  
n 2023   
   
                                                    MR LUMBAR SPINE WO   
CONTRAST                                EXAM: MR LUMBAR   
SPINE WO CONTRAST  
DATE:2023 2:30   
PM  
CLINICAL HISTORY:   
low back pain, leg   
weakness.  
COMPARISON: None   
available.  
TECHNIQUE:   
Multiplanar MR   
imaging of the   
lumbar spine was   
performed without   
contrast.  
FINDINGS:  
The spine is   
visualized from   
T11-12 through the   
S2, on the   
diagnostic sagittal   
sequences.  
Alignment: Lumbar   
lordosis is   
maintained.   
Approximately 5 mm   
of anterolisthesis   
of L4 over L5.   
Vertebral body   
heights and   
remaining alignment   
are within normal   
limits.  
Bone marrow   
signal/fracture:   
Mild bone marrow   
edema lower facet   
joints. Otherwise,   
unremarkable.  
Conus: The conus is   
within normal limits   
of signal intensity   
and morphology.  
Paraspinal soft   
tissues: Paraspinal   
soft tissues are   
within normal   
limits.  
Lower thoracic   
spine: Visualized   
lower thoracic canal   
and foramina are   
without significant   
narrowing.  
L1-2: Unremarkable.  
L2-3: Mild disc   
space narrowing,   
mild diffuse disc   
bulging, and mild   
hypertrophic facet   
and ligamentum   
flavum changes, with   
borderline central   
spinal stenosis and   
neural foraminal   
narrowing.  
L3-4: Mild to   
moderate disc space   
narrowing, mild   
diffuse disc   
bulging, and mild to   
moderate   
hypertrophic facet   
and ligamentum   
flavum changes, with   
mild central spinal   
stenosis and neural   
foraminal narrowing.  
L4-5: Grade 1   
anterolisthesis,   
mild disc space   
narrowing, moderate   
diffuse disc   
bulging, and marked   
hypertrophic facet   
changes, which   
results in moderate   
to marked central   
spinal stenosis,   
lateral recess and   
neural foraminal   
narrowing, greater   
on the right.  
L5-1: Mild diffuse   
disc bulging and   
mild to moderate   
hypertrophic facet   
and ligamentum   
flavum changes,   
without significant   
central spinal   
stenosis or neural   
foraminal narrowing.  
Sacrum and iliac   
wings: Unremarkable.  
IMPRESSION:  
LUMBAR SPONDYLOSIS   
AND DEGENERATIVE   
DISC DISEASE,   
PREDOMINANTLY L4-5.  
ELECTRONICALLY   
SIGNED BY: Edilberto Martinez MD          Normal                                  Not Available  
   
                                                    Quick Strepon 2023   
   
                                                    S. pyogenes Org   
specific cx Ql   
(Throat)        Negative                                        [facility] [place]   
[facility]  
Other Phone:   
[phone]  
   
                      Quick Strep                                  [facility] [place]   
[facility]  
Other Phone:   
[phone]  
   
                                                    SARS-CoV-2 (COVID-19) RNA NA  
A+probe Ql (Resp)on 2023   
   
                                                    SARS-CoV-2   
(COVID-19) RNA   
ALEX+probe Ql (Unsp   
spec)           Negative                                        [facility]  
Other Phone:   
[phone]  
   
                                                    XR FINGER MIN 2 VIEWSon    
   
                                                    XR FINGER MIN 2   
VIEWS                                   XR FINGER MIN 2   
VIEWS, 3/19/2023   
2:55 PM EDT,   
INDICATION:  
Swelling of finger  
COMPARISON:  
None available at   
the time of   
dictation.  
TECHNIQUE:  
Three views with   
attention to the   
left finger are   
reviewed.  
FINDINGS:  
Alignment is   
maintained.  
No fracture is   
identified.  
No other acute   
process is seen.  
IMPRESSION:  
No definite fracture   
is seen. No gas or   
radiopaque foreign   
body seen within the  
soft tissue.  
Electronically   
authenticated by:   
SADAF LIM Date:   
2023 15:44    Normal                                  [facility]  
   
                                                    Office Visit (Cardiology)on   
2023   
   
                                        Follow-up visit     Diagnoses/Problems  
Assessed  
Palpitations (785.1)   
(R00.2)  
Paroxysmal atrial   
fibrillation   
(427.31) (I48.0)  
Shortness of breath   
(786.05) (R06.02)  
Sinus bradycardia   
(427.89) (R00.1)  
Sleep apnea (780.57)   
(G47.30)  
Class 1 obesity with   
body mass index   
(BMI) of 31.0 to   
31.9 in adult   
(278.00,V85.31)  
(E66.9,Z68.31)  
Current smoker on   
some days (305.1)   
(F17.200)  
1 PPD  
Hypertriglyceridemia   
(272.1) (E78.1)  
Seizure disorder   
(345.90) (G40.909)  
Orders  
Class 1 obesity with   
body mass index   
(BMI) of 31.0 to   
31.9 in adult  
Healthy Weight Tips;   
Status:Complete -   
Retrospective   
Authorization; Done:   
[id number]  
Some eating tips   
that can help you   
lose weight.;   
Status:Complete -   
Retrospective  
Authorization; Done:   
[id number]  
Hypertriglyceridemia  
Renew: Simvastatin   
20 MG Oral Tablet;   
TAKE ONE TABLET BY   
MOUTH ONCE DAILY IN  
THE EVENING  
Paroxysmal atrial   
fibrillation  
Renew: Aspirin EC 81   
MG Oral Tablet   
Delayed Release;   
TAKE 1 TABLET DAILY  
SocHx: Current   
smoker on some days  
You need to stop   
smoking. Though it   
is not easy, more   
than half of all   
adult smokers  
have quit. We   
encourage you to   
write down all the   
reasons you should   
quit smoking and  
set a quit date for   
yourself. Ask us how   
we can help. You may   
also call  
[employer] for   
free resources and   
assistance.;   
Status:Complete -   
Retrospective  
Authorization; Done:   
[id number]  
Tobacco Use   
Screening;   
Status:Complete;   
Done: [id number]  
Patient Instructions  
Please bring all   
medicines, vitamins,   
and herbal   
supplements with you   
when you come to the   
office.  
Prescriptions will   
not be filled unless   
you are compliant   
with your follow up   
appointments or have   
a follow up   
appointment   
scheduled as per   
instruction of your   
physician. Refills   
should be requested   
at the time of your   
visit.  
Follow up in 1 year.  
Retrieve lab work  
  
  
Chief Complaint  
NATANAEL GANDHI is   
being seen for an   
annual follow-up of.  
  
History of Present   
Illness  
Patient is here for   
follow-up continue   
management for   
previous evaluation   
for palpitation and   
1 episode of atrial   
fibrillation,   
hyperlipidemia and   
previous complaint   
of shortness of   
breath. Since last   
time I saw him he   
reported only 1   
brief episode of   
palpitation lasting   
less than a few   
minutes. He denies   
lightheadedness,   
dizziness or   
syncope. He   
underwent sleep   
evaluation and the   
result noted. He   
reports he is trying   
to cut down on his   
smoking and will be   
involved in smoking   
cessation program.   
Laboratory data done   
at his family   
physician. He has   
lost close to 15   
pounds.  
Assessment  
1. Palpitation . His   
event monitor   
despite complaint of   
palpitation failed   
to demonstrate any   
significant   
arrhythmia. Patient   
mother indicated   
that he does have an   
element of anxiety.   
And documentation of   
recurrence atrial   
fibrillation  
2. Prior history of   
one episode of   
atrial fibrillation   
with low chads   
vascular score. He   
is on aspirin   
therapy  
3. Obesity recent   
weight loss close to   
15 pounds  
4. Hyperlipidemia on   
treatment  
5. Shortness of   
breath related to   
prior tobacco use.   
He reported   
improvement since he   
lost the extra   
weight  
6. Tobacco use  
7. Sleep evaluation   
noted patient   
continue to follow   
with pulmonary/sleep   
clinic  
Plan  
1. Patient was   
counseled regarding   
risk factor   
modification  
2. Patient was   
advised to remain on   
an aspirin 81 mg   
once daily  
3. Patient was   
counseled regarding   
losing weight,   
exercise and dietary   
modification  
4. We reviewed the   
results of his sleep   
evaluation  
5. I do not find me   
if he develop any   
recurrence of his   
symptoms of   
palpitation or chest   
pain  
6. I suggested 6   
month follow-up and   
I advised the   
patient to try to   
lose 10 pounds by   
next time  
7. We will see him   
in 1 year in   
follow-up or earlier   
if the need arise  
  
Current Meds  
  
Medication   
NameInstruction  
Aspirin EC 81 MG   
Oral Tablet Delayed   
ReleaseTAKE 1 TABLET   
DAILY.  
Ibuprofen 100 MG/5ML   
Oral SuspensionTAKE   
10 ML 4 times daily   
PRN  
levETIRAcetam 100   
MG/ML Oral   
SolutionTAKE 7.5 ML   
TWICE DAILY  
Simvastatin 20 MG   
Oral TabletTAKE ONE   
TABLET BY MOUTH ONCE   
DAILY IN THE EVENING  
Allergies  
Medication  
Benadryl TABS  
Allergy;   
Hypertension;;   
Recorded By:   
Sandra Felix;   
2021 8:19:32   
PM  
Topiramate TABS  
Adverse Reaction;   
Depression;;   
Recorded By: Nacho Cr; 2023   
1:00:26 PM  
predniSONE  
Adverse Reaction;   
Recorded By: Nacho Cr; 2023   
1:00:26 PM  
Social History  
Problems  
Caffeine use   
(V49.89) (Z78.9)  
1 cup-2 cups coffee   
in AM, 2-3 POPS A   
DAY  
Current smoker on   
some days (305.1)   
(F17.200)  
1 PPD  
No alcohol use  
No illicit drug use  
Review of Systems  
Constitutional: not   
feeling tired.  
Cardiovascular: no   
intermittent leg   
claudication and as   
noted in HPI.  
Respiratory: no   
cough and no   
shortness of breath.  
Gastrointestinal: no   
change in bowel   
habits and no blood   
in stools.  
Integumentary: no   
skin rashes.  
Neurological: no   
seizures and no   
frequent falls.  
All other systems   
have been reviewed   
and are negative for   
complaint.  
  
Vitals  
Vital Signs  
Recorded: [id number]   
01:05PM (more   
content not   
included)...        Normal                                   [facility]  
   
                                                    Covid-19 PCR (CVDTBH)on    
   
                                                    SARS-CoV-2   
(COVID-19) RNA   
ALEX+probe Ql (Unsp   
spec)           Not detected    Normal          NOT DETECTED    [facility]  
   
                                        Comment on above:   Result Comment: This  
 test is not yet approved or cleared by   
the   
[place] FDA. When there are no FDA-approved or cleared tests   
available, and other criteria are met, FDA can make tests available   
under an emergency access mechanism called an Emergency Use   
Authorization (EUA). The EUA for this test is supported by the   
 of Health and Human Service's (HHS's) declaration that   
circumstances exist to justify the emergency use of in vitro   
diagnostics for the detection and/or diagnosis of the virus that   
causes COVID-19. This EUA will remain in effect (meaning this test   
can be used) for the duration of the COVID-19 declaration   
justifying emergency of IVDs, unless it is terminated or revoked by   
FDA (after which the test may no longer be used).  
When diagnostic testing is negative, the possibility of a false   
negative should be considered in  
the context of a patient's recent exposures and the presence of   
clinical signs and symptoms  
consistent with SARS-CoV-2.   
   
                                                            Performed By: #### C  
VDTBH ####  
[facility]  
[address] [place] [address]  
Dr. Joon Núñez   
   
                                                    INFLUENZA A AND B AGon    
   
                      INFLUBNEGH SEE BELOW  Normal                [facility]  
   
                                        Comment on above:   Result Comment: Nega  
tive for Flu B protein antigen. Infection   
due   
to Flu B cannot be ruled out. Flu B antigen in the sample may be   
below the detection limit of the test.   
   
                                                            Performed By: #### I  
NFLUAB ####  
[facility]  
[address] [address]  
Dr. Joon Núñez   
   
                          INFLUENZA A AG Positive     Abnormal     NEGATIVE SEE   
COMMENT                                 [facility]  
   
                                        Comment on above:   Performed By: #### I  
NFLUAB ####  
[facility]  
[address] [address]  
Dr. Joon Núñez   
   
                          INFLUENZA B AG Negative     Normal       NEGATIVE SEE   
COMMENT                                 [facility]  
   
                                        Comment on above:   Performed By: #### I  
NFLUAB ####  
[facility]  
[address] [place] [address]  
Dr. Joon Núñez   
   
                      INFLUPOSH  SEE BELOW  Normal                [facility]  
   
                                        Comment on above:   Result Comment: NOTE  
: Live attenuated influenzae vaccine   
viruses   
can cause a positive result for a rapid influenza diagnostic test   
if administered up to 7 days prior to rapid testing.   
   
                                                            Performed By: #### I  
NFLUAB ####  
[facility]  
[address] [place], [place] [address]  
Dr. Joon Núñez   
   
                          INTERNAL CONTROLS Within Normal Limits Normal       Wi  
thin Normal   
Limits                                  [facility]  
   
                                        Comment on above:   Performed By: #### I  
NFLUAB ####  
[facility]  
[address] [place], [place] [address]  
Dr. Joon Núñez   
   
                                                    XR Wrist Complete Right*on 0  
2022   
   
                                                    XR Wrist Complete   
Right*                                  HISTORY: Wrist pain  
  
COMPARISON: None   
available  
  
TECHNIQUE: AP,   
lateral, oblique and   
scaphoid views of   
the wrist obtained.  
  
FINDINGS:  
  
No acute fracture or   
dislocation. Carpal   
and radiocarpal   
alignment is   
satisfactory.  
  
Negative ulnar   
variance. There is a   
7 mm area of   
sclerosis within the   
lunate that  
is nonspecific. Soft   
tissues are within   
normal limits.  
  
IMPRESSION:  
  
No acute fracture.  
  
Nonspecific 7 mm   
area of sclerosis   
within the lunate   
may represent a bone   
island  
or osteonecrosis.   
Follow-up MRI of the   
wrist without   
contrast is   
recommended to  
further evaluate.  
  
  
Report reported and   
signed by BREONNA HOLT on 2022   
1120                Normal                                  [facility]   
Medical   
Specialist  
   
                                                    XR SHOULDER RT 2V or >on    
   
                                                    XR SHOULDER RT 2V   
or >                                    EXAM: XR SHOULDER RT   
2V or >  
HISTORY: Acute pain   
due to injury  
COMPARISON: None.  
TECHNIQUE: 3 views   
were obtained of the   
right shoulder.  
FINDINGS:  
A fracture is not   
identified.  
A cortical   
irregularity is not   
detected.  
The glenohumeral   
joint appears   
normal.  
The AC joint appears   
normal.  
A foreign body is   
not identified.  
The visualized   
portion of the lung   
is clear.  
A rib fracture is   
not identified.  
A foreign body is   
not preserved. There   
is no significant   
soft tissue   
swelling.  
IMPRESSION:  
1. Normal right   
shoulder.  
Electronically   
authenticated by:   
TORI STRINGER Date:   
2022 17:23    Normal                                  [facility]  
   
                                                    XR FOOT DURAN MIN 3 VIEWSon    
   
                                                    XR FOOT DURAN MIN 3   
VIEWS                                   EXAMINATION: XR   
ANKLE DURAN MIN 3   
VIEWS, XR FOOT DURAN   
MIN 3 VIEWS  
HISTORY: Bilateral   
ankle joint pain  
COMPARISON: No   
relevant comparison   
available.  
FINDINGS:  
RIGHT FINDINGS:  
BONES: No   
significant   
arthropathy or acute   
abnormality.   
Incidental ununited  
ossification along   
the proximal dorsal   
margin of the talus.  
SOFT TISSUES: No   
visible soft tissue   
swelling.  
OTHER: Negative.  
LEFT FINDINGS:  
BONES: No   
significant   
arthropathy or acute   
abnormality.   
Incidental ununited  
ossification along   
the proximal dorsal   
margin of the talus.  
SOFT TISSUES: No   
visible soft tissue   
swelling.  
OTHER: Negative.  
IMPRESSION:  
RIGHT CONCLUSION: No   
acute bone   
abnormality,   
significant   
degenerative joint  
disease, or   
suspicious findings.  
LEFT CONCLUSION: No   
acute bone   
abnormality,   
significant   
degenerative joint  
disease, or   
suspicious findings.  
  
Electronically   
authenticated by:   
EDILBERTO STROUD Date:   
2022 16:24    Normal                                  [facility]  
   
                                                    Auth for Release of Medical   
Recordson 2022   
   
                                                    Auth for Release   
of Medical Records                      [id number]  
[id number]  
#1.00CD:127         Normal                                  [facility]  
   
                                                    Tobacco Screening.on   
022   
   
                                                    Tobacco use status   
CP            a) Yes                                          [facility]   
[facility]   
[address] DO  
Work Phone:   
[phone]  
   
                      Tobacco Screening. Yes                              [facility]   
[facility]   
[address] DO  
Work Phone:   
[phone]  
   
                                                    Tobacco use status   
[facility]            b) No                                           [facility]   
[facility]   
[address] DO  
Work Phone:   
[phone]  
   
                                                    Pre-Certification Formon    
   
                                                    Pre-Certification   
Form                                    [id number]  
[id number]  
#1.00CD:127         Normal                                  [facility]  
   
                                                    Pathology Noteon 2021   
   
                                        Pathology Note      [id number]  
[id number]  
1#1.00CD:127        Normal                                  [facility]  
   
                                        Comment on above:   Result Comment: Elec  
tronically Signed By: Can Phelps MD, Branden LENZ\.gloria\Date and Time Signed: 06/15/21 09:12 EDT   
   
                                                    Reference Lab Reporton    
   
                                                    Reference Lab   
Report                                  [id number]  
[id number]  
8#1.00CD:127        [facility]  
   
                                                    Reminderson 2021   
   
                                        Reminders           --------------------  
-  
From: Sangita Blackwell  
To: EU - Clinical;  
Sent: 2021   
10:44:55 EDT Show   
up: 2021   
10:44:00 EDT  
Subject: CT/ Semen   
analysis  
Reminder/Recall  
CT a/p being done at   
[facility] [place], faxed order for   
precert and to be   
scheduled  
Semen analysis being   
done  or [facility] [place] [facility]  
Fish/cytology done   
21  
--------------------  
-  
From: Clara Corley MA (EU -   
Clinical)  
To: Sangita Blackwell;  
Sent: 2021   
16:02:02 EDT Show   
up: 2021   
16:01:00 EDT  
Subject: RE: CT/   
Semen analysis  
Natanael was   
scheduled for a CT   
at [facility] [place] on 21   
and he No showed his   
appt. What do we do   
now?                Normal                                  [facility]  
   
                                        Reminders           --------------------  
-  
From: Clara Corley MA  
To: EU - Clinical;  
Sent: 2021   
14:59:50 EDT Show   
up: 2021   
14:59:00 EDT  
Subject:   
FIsh/Cytology  
  
Reminder/Recall  
  
  
Fish/Cytology done   
through [facility]  
Fish/Cytology is   
negative.           Normal                                  [facility]  
   
                                                    Lab Miscellaneous-LCon    
   
                                Lab Miscellaneous COMMENT         Invalid   
Interpretation   
Code                                                [facility]  
   
                                        Comment on above:   Result Comment: Test  
 Ordered: [id number] Bladder Cancer MD ALISON   
Review  
Bladder Cancer FISH Findings: Comment 4_  
Negative UroVysion Result  
Fluorescence in situ hybridization (FISH) of cells  
recovered from urine was performed using the [employer] UroVysion  
Kit.  
A minimum of twenty-five cells was examined, and an  
abnormal signal pattern was not detected, indicating a  
NEGATIVE result.  
The performance characteristics of this test have been  
validated by [employer]. A positive result is the  
detection of four or more cells with greater than two  
signals for at least two chromosomes (3, and/or 7, and/or  
17) and/or twelve or more cells with no signal for  
chromosome 9. Probes: 3cen(D3Z1), 7cen(D7Z1), 9p21(p16),  
17cen(D17Z1)  
Specimen Type: Comment 4_  
Unspecified Collection Method  
Specimen Description: Comment 4_  
Received is 60ml of yellow, clear, preserved urine.  
Electronically signed: Comment 4_  
Alyson Rodas M.D.  
Clinical Data: Comment 4_  
No clinical data specified  
CPT Codes: Comment 4_  
[address]  
Performed at:  [address] [id number]  
[id number] PhD Jimena Cantu   
   
                                                            Performed By: #### 1  
[id number] ####  
[facility]  
[address] [place] [address]   
   
                                                    Coding Summary.on 2021  
   
   
                                        Coding Summary.     [id number]  
Gh0bWw+PGhlYWQ+PE1FV  
AGuC82gpBSifR6WD5tBL  
O2NCPRFGGROHJ4GAX4sl  
GO4AMxxD4YcczFx  
OvrozJLpUE21RKi5QEQ1  
yRrfSTamxT0anMSfY5n7  
SdRnDI41uR74WSpvHNXi  
TmX0HfPzhsebvYSd  
X2kyJvYnhAOiSll+PHRh  
YmxlIHdpZHRoPScxMDAl  
OkLmpLzmNF1hOs5iBEDw  
LWNvbGxhcHNlOiBj  
w9hnQWIhBIfjII6zpTau  
V3JzmRB7BJOot6z1Fd06  
dHI+XVUqGBZ1pBlpAAhy  
h755LiVnp7mdQTG5  
kAPsYQaoOSX9I01hx8I3  
FDUqBROhAZX9nZN5lG5f  
rYugmvgrO2VkyIDgJhO0  
XQA2xAWtzH5rpEic  
xcavdL7lEqs+O98EIL6S  
ZYRGWX4FGzr5Z8YzYnkm  
dHI+KY01DLWjHF10qAIa  
dLSku5kqmIh7YgSs  
CKUdBYL7wSsqFSezm1Pm  
IWSaL93evGZtn7T8ORQg  
yLskbBQqPaYqfOB8cC3r  
VHrhlvoml0mireon  
Hvlhb1fklk08kJ99U77k  
XBxwLXAfFPJ8UWLwVHIk  
mOzrdu3jiU4kEc1+IDxj  
n1hpf1tcaPz4RlJf  
JDMwobIicXccHTJ7n3Mp  
Qt49K3GkpNhas8MyYop5  
hu25iBTfq4F3gED6AOxl  
XMCltG5pLXwmBuY7  
OVJtLcJkwR91yQLfWCvq  
Ag2ovLevhDqtHF3rKHVa  
iqrqRJLmhH0sBPYmeKHx  
lTdmYV1zVQOesqzs  
n474BaVsDKW1PYDjeGDx  
F0VrvR2wZrXdXPVvXDDb  
E8NypYLjMLtbR227LPei  
GvS3ESBervCmG2Qj  
FTLuhDvnKiM5k1C1Ro8P  
x8PpzxkaQYI3TIprFWU3  
ThBjQfNaDaY6C8YfRhe4  
BBDnyFecZH9yZ1Xr  
AGVvzlrhyrfpcTM6TPVm  
RPQvaI85uHGpDUwwDu6h  
e7B6l749GQBtWSGxqB55  
Bw2jgBlcWYClgXWW  
eC2gjowep7wpvsafVzTc  
CZKxHEv9FHr4JOAhcSxu  
MxHxSVM4SjS2VEC1fBOj  
uS4qeIjqdwrkeG3v  
Oyc+S15agB9rGHV6MXB4  
vfdfXLYaplPhRK53ET97  
M5AaWrhkeBQukUH+PGRp  
ghSgwUatWS7rAhPz  
r0rrg4EnZZtaU7GpTJZn  
SJarLxi7KHBjMHC8oXW9  
tM9eZZAsQAgip8V8bXU1  
W8EopqPkpl1qt4co  
RZMmOJoiS52scLGaq4P8  
UPNqqXN0WEEsxOcjOuZp  
nX12Dsb+MWChdArxt2On  
Xtcqn6zoq4lwxYy8  
IjMwJSIgdmFsaWduPSJ0  
v5ZbTe18S67jYVtzRYLd  
CRGcWBCqVLTlsFpbkj0m  
bW0hGm2+PGNvbCB3  
aRN1xO3uSWAnPhI7DLjr  
I590OfLtzVRcKlxfd2xq  
w7arwRf3QfCrMKSgreFq  
kRxmRGA8e2EcPk71  
Y10vRWfjYOMzWBMjANBl  
JXAlyXvrvi7nmP4xVx0+  
QA8ra0cbon61mW04cAO+  
IJFgKBK9bVazQRmq  
MOCksA9yNJnmOrK9PPPp  
XuBtvX14kRLjUEutJl9d  
oNepxVftYT5gOQQnpogo  
h112HrGia4voLRSc  
uHFaEBnkAVQ4C98qo2W5  
ZUBfOYTlESZ8gPR7oC2n  
bGlnbjogbGVmdDsgdmVy  
cMmzXYiqHPdcZ540  
IHRvcDsnPlBhdGllbnQg  
IiFwQEh4O7BoLen6GLSj  
oCuuPG5uxFTyWBbuDh1r  
qUhnpNorFK0bCZGn  
zuxxk849LaKxt4hyFQCb  
bCZyIPmwEIR8W62lu2D9  
FMIxHGJcRUU2jOR3hT8f  
bGlnbjogbGVmdDsg  
rpDqoCkbNBusRZljG667  
IHRvcDsnPkJpcnRoIERh  
jPP8KF03QH89dEFmr9T1  
hAO6G6VdQICqptez  
xndxkTN2SRSiVJWdpQ89  
Cs8hoNwoHa5uQIDmVIB8  
KZUiaLCqG5LkwM3tXqQi  
IGEsQNZvD9UdyIQo  
UMrmI389SYzdPjA4YTYa  
zlGnF4IzFITbjCvhHhR9  
j2M5Bk7CH4K3BM62MW55  
hDAxl8Z9xPN7H3Je  
CDOaahjvmaefhZU0LWGf  
YKSdmC43Th3qlTggJx6s  
ZRLyZOQ5DFHwrSBgU7Vu  
iR5xFqLtFPCxPSHi  
X2FmrIVfVKqxJ188JDfl  
NaG4NQCtutTgB3CwLEDo  
oRgwYyL1l0B8Bw9UTNe7  
XP11DR69kNSjd2J8  
qHY4R1JpIDUtrdzohirg  
jYR3OHEfXFZktE63Gr2y  
mIwvOk3tDKUuUOL5ZSXl  
vHEhT5LnnC1xQkKr  
MGLvISRiW8QeuJMiWBmm  
R536VSghFwL6TUDuhiTq  
P8FnDTXvaTdnNyA7z7F0  
Mm1PMTIrBP23AHU7  
nLX5DY04WS73F8EeOnzj  
dGFibGU+PHRhYmxlIHdp  
ZHRoPScxMDAlJyBzdHls  
DU4dJs1zNWUaBADh  
zPzijICfObRid4bbXWCk  
NLbsIS6wvUkcZ8PtuBS2  
QRVyo9b5Tu70S76lT9Tb  
dXA+NFHjpUB0bAV6  
sM9iTlNoAtR3ZZaiS610  
AqVlbXHpIejht3vwu1be  
eWq3HeT1RUYmojXfbFxe  
XUR1r5UcQr08L84x  
IHdpZHRoPSIxNSUiIHZh  
uLmanu1afS7eFu1+PGNv  
bMK7uLM4vU5xVlRmMqX8  
MGisS788BpNmmGFq  
Axvul6nbk5aumTb5BhJm  
GXEmboWglSgxPPH7t6Jw  
Up01J5DyaFyie9NyHsi3  
kx09vCWan5Q6bJS4  
W7VyRTCnkffldAXujYrx  
TX7bPDXqknheLJNjoF2r  
AURxU6s3OdLtUhO0SZxk  
N3JgjbP0NWIivXRf  
GOxbCOC1S58eh1V9ANFg  
WLRuEXX5wKD1cL7ehAgu  
bjogbGVmdDsgdmVydGlj  
DStvOIhuB258PPJj  
bOszIQXftJ7cLVCteVDr  
tVidSC5hAEZlkltaWuEV  
T0MnYKDOIVNCTRNRPXFQ  
ZDU8Q2CkNue2BIRn  
vTobPK8aeJZsLRmvMg4p  
yIijvWfcBT6oXCKnsaxv  
MQUvwY1jRBMedSGfuAib  
MG6uWODmhzkyf546  
DxQtLKO8QNOjpGGjV3By  
rH8tBqOmGKHsQREgW0Xz  
kNHnOWpoC162CZzyDgF1  
UBKhpfReV2RqCFQx  
bThcLxY8j9H9We8uCD1r  
Ku4sWGb0FS18BW22jEVf  
y1C1vGB7N8ZmKHVntvuw  
fngdeNP0FJXjLQVg  
tI71fHPaUSkkJn3zf5Z0  
p316WODeMIZpnO19Df0h  
bMucLCXpeFZLrW9xttys  
q8qneatyNwBxWYYv  
WLa4JUo9ZUSshWocSiGs  
JYV3YwE3BZG5pZVgcP0r  
hXwpnvqngP5nNgy+NDIg  
IKCqtlD7C0FtGrv6  
KVJvvWbmIG8pjRUfEJas  
Zu2wrAwvdYugBV4aFGVd  
vgarYNArpO6rRQAanSFa  
uLiuAQ8yTCEnkqsj  
r375HtUwPLJ2BBByuAYu  
S4VurX3mYoItDXLqGLMh  
Z8GpeWGaMRnxI378CFtc  
UzG8VNHwcmYwH2Tp  
MNKteYzdNsT0h1R4Yz4N  
RPopDK67HK28dBFwt4M9  
vKF5E0QuDPQqjfaeiiwj  
oAA8ZTWdBMBetN19  
pOJzUJqoAs6cr0G3f946  
VPZlOOWxxO93Vs6dfRei  
CSAvzDWNdT5hrwtuy1yy  
cjogIzAwMDAwMDt0  
IIm5EFInyXiqZySvWTD4  
SsS7XIZ4vSDwaM4cfQir  
tbmajM0tTtt+TGFiIERy  
m8Zrw0BqCJ74VB23  
L9QtUbmrlPTmpKM+PHRh  
YmxlIHdpZHRoPScxMDAl  
KmLyxUuhFP5tEo4iPYOn  
LWNvbGxhcHNlOiBj  
y5czHKAcFBeuPA4vgUqc  
G2ZolQV5ICXnm2j2Pg50  
H40cC2VioRN+PGNvbCB3  
cNI9aW0qEhNrBjF3  
IWgxJ786FzJywEVvEedj  
o0pkz1nobLu7DbUvHWKz  
eyHcjXnaNZA7a8YaUo15  
X30iJLzhBGOhFYNb  
MUDrOOEanTwnkb5cmH0k  
Ii8+TLOkfQI7xOL5mK6q  
PhJdPaG4MKzmD014TyPh  
sMHgJhqhX86rO8Hy  
dXA+RAFlBcf0QLBxsNag  
IJ2cpNTgACasIe3iRCD9  
TtYcQaOwZXpeB2OiUGIo  
zzwgerucfQI1VCPb  
JREplP68Xn3hyFzoIs3z  
WLSyLXZ6INSezGWlQ9Hx  
lN1oGoFdEBYkFEFcH9Bx  
jZDrSRbsP516FQbm  
YzF9GTOhvyBpJ2AiNHFd  
uYyrFuL1m2O5Ma0OaMlo  
lSXsDB9nRtYoOPm0P5Pz  
Kxt9MDIhuFrvJP6u  
lLLrESkaPg0qeSedsIlc  
FZ1nPJTcgezsy007TmBi  
n0oyRHCfiOAfTYclQNU6  
A64dh0Q9CPBhPYXq  
BKP5wIL1kM3izJocoatw  
bGVmdDsgdmVydGljYWwt  
PCqqV585ERPcdItoLnEZ  
Amz6Q6YyYjp6EUJm  
oGvbMA1fiMPpKEzxLv4u  
lJewaPncSW4dHCQpvrjs  
a990UaXys5ktIRAatZBp  
ODwdKHQ7G63bb7H0  
HYNaBKFeKPN6jID8nD6i  
bGlnbjogbGVmdDsgdmVy  
tCduAAwxQCauA646XOSz  
aGepEg3OQdq7N6Od  
Trv6NAWfwRlcKR9hhVEv  
TLgqPa5byRfecZujCF1s  
NISdlfrqj415ViXhc4ie  
IDEwcHQgVGltZXM7  
D39ve4A8LRLkYIBnQPA2  
hPN7zP8tbBjipwslgIUm  
dDsgdmVydGljYWwtYWxp  
M767TGVokZesEuBp  
eWVyOjwvdGQ+AD01fc59  
X0NwYuglTmg0AGEhTDD9  
sDK5xU4tPAIkLEhiv3A1  
jBN4B7MjzzTscg7u  
b2xs (more content   
not included)...    Normal                                  [facility]  
   
                                                    Ambulatory Clinical Summaryo  
n 2021   
   
                                                    Ambulatory   
Clinical Summary                        {[id number]  
l-[id number]-qi-k0-53-  
2a-e9-63}CD:[id number]  Normal                                  [facility]  
   
                                                    Lab Miscellaneous-LCon    
   
                                Test Code       218919          Invalid   
Interpretation   
Code                                                [facility]  
   
                                        Comment on above:   Performed By: #### 1  
[id number] ####  
[facility]  
[address] [place] [address]   
   
                                Test Name       FISH            Invalid   
Interpretation   
Code                                                [facility]  
   
                                        Comment on above:   Performed By: #### 1  
[id number] ####  
[facility]  
[address] [place] [address]   
   
                                                    Patient Educationon 20  
21   
   
                                        Patient Education   Urology  
Hematuria, Adult  
(Inserted Image.   
Unable to display)  
Hematuria is blood   
in the urine. Blood   
may be visible in   
the urine, or it may   
be identified with a   
test. This condition   
can be caused by   
infections of the   
bladder, urethra,   
kidney, or prostate.   
Other possible   
causes include:  
? Kidney stones.  
? Cancer of the   
urinary tract.  
? Too much calcium   
in the urine.  
? Conditions that   
are passed from   
parent to child   
(inherited   
conditions).  
? Exercise that   
requires a lot of   
energy.  
Infections can   
usually be treated   
with medicine, and a   
kidney stone usually   
will pass through   
your urine. If   
neither of these is   
the cause of your   
hematuria, more   
tests may be needed   
to identify the   
cause of your   
symptoms.  
It is very important   
to tell your health   
care provider about   
any blood in your   
urine, even if it is   
painless or the   
blood stops without   
treatment. Blood in   
the urine, when it   
happens and then   
stops and then   
happens again, can   
be a symptom of a   
very serious   
condition, including   
cancer. There is no   
pain in the initial   
stages of many   
urinary cancers.  
Follow these   
instructions at   
home:  
Medicines  
? Take   
over-the-counter and   
prescription   
medicines only as   
told by your health   
care provider.  
? If you were   
prescribed an   
antibiotic medicine,   
take it as told by   
your health care   
provider. Do not   
stop taking the   
antibiotic even if   
you start to feel   
better.  
Eating and drinking  
? Drink enough fluid   
to keep your urine   
clear or pale   
yellow. It is   
recommended that you   
drink 3?4 quarts   
(2.8?3.8 L) a day.   
If you have been   
diagnosed with an   
infection, it is   
recommended that you   
drink cranberry   
juice in addition to   
large amounts of   
water.  
? Avoid caffeine,   
tea, and carbonated   
beverages. These   
tend to irritate the   
bladder.  
? Avoid alcohol   
because it may   
irritate the   
prostate (men).  
General instructions  
? If you have been   
diagnosed with a   
kidney stone, follow   
your health care   
provider's   
instructions about   
straining your urine   
to catch the stone.  
? Empty your bladder   
often. Avoid holding   
urine for long   
periods of time.  
? If you are female:  
? After a bowel   
movement, wipe from   
front to back and   
use each piece of   
toilet paper only   
once.  
? Empty your bladder   
before and after   
sex.  
? Pay attention to   
any changes in your   
symptoms. Tell your   
health care provider   
about any changes or   
any new symptoms.  
? It is your   
responsibility to   
get your test   
results. Ask your   
health care   
provider, or the   
department   
performing the test,   
when your results   
will be ready.  
? Keep all follow-up   
visits as told by   
your health care   
provider. This is   
important.  
Contact a health   
care provider if:  
? You develop back   
pain.  
? You have a fever.  
? You have nausea or   
vomiting.  
? Your symptoms do   
not improve after 3   
days.  
? Your symptoms get   
worse.  
Get help right away   
if:  
? You develop severe   
vomiting and are   
unable take medicine   
without vomiting.  
? You develop severe   
pain in your back or   
abdomen even though   
you are taking   
medicine.  
? You pass a large   
amount of blood in   
your urine.  
? You pass blood   
clots in your urine.  
? You feel very weak   
or like you might   
faint.  
? You faint.  
Summary  
? Hematuria is blood   
in the urine. It has   
many possible   
causes.  
? It is very   
important that you   
tell your health   
care provider about   
any blood in your   
urine, even if it is   
painless or the   
blood stops without   
treatment.  
? Take   
over-the-counter and   
prescription   
medicines only as   
told by your health   
care provider.  
? Drink enough fluid   
to keep your urine   
clear or pale   
yellow.  
This information is   
not intended to   
replace advice given   
to you by your   
health care   
provider. Make sure   
you discuss any   
questions you have   
with your health   
care provider.  
Document Released:   
2006 Document   
Revised: 2020   
Document Reviewed:   
2018  
Elsevier Patient   
Education ?    
[employer]       [facility]  
   
                                                    Urology Office/Clinic Noteon  
 2021   
   
                                                    Urology   
Office/Clinic Note                      Chief Complaint  
gross hematuria  
Natanael is here   
because of a history   
of gross hematuria   
which he relates to   
episode of straining   
to hold his urine   
for a long. He is a   
cigarette smoker and   
has been for least   
30 years with at   
least 1 pack a day.   
He had an episode   
with 3 days of   
painless gross   
hematuria that has   
since resolved.   
Presently has   
microscopic   
hematuria and is   
here today for   
urologic evaluation.   
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   
an undescended right   
that was managed   
surgically. He   
states he had no   
children and wonders   
if this might be the   
cause of his   
perceived   
infertility. He is   
here today to   
establish urologic   
care.  
HPI Staff  
Natanael is a 41 y.o.   
male here for gross   
hematuria. Patient   
states that he   
noticed it after   
holding urine. He   
noticed blood   
intermittently x3   
days. Patient   
noticed that he did   
have some pain in   
groin at that time  
Dysuria: _Denies   
burning or   
discomfort with   
urination  
Incomplete bladder   
emptying: _Denies  
Hematuria: _Has not   
noticed any blood in   
urine over a month  
Frequency: _Denies  
Urgency: _Has some   
urgency  
Nocturia: _maybe   
once  
Stream: _Strong.   
Denies hesitancy..  
Leaking: _Denies  
Post void dripping:   
_Denies  
Wearing pads/   
Depends: _Denies  
Urge incontinence:   
_Denies  
Stress incontinence:   
_Denies  
Incontinence without   
Sensory Awareness:   
_Denies  
Abdominal pain:   
_Denies  
Flank pain: _Denies  
Sexual complaints: _  
History of Present   
Illness  
Reviewed UA,   
referral, and new   
pt. forms.  
There have been no   
associated fever,   
chills, or flank   
pain. Pt. denies any   
pain/burning with   
urination at this   
time.  
Review of Systems  
PHQ Score  
Initial Depression   
Screen Score: 0  
ROS - Provider  
Constitutional:   
denies weight loss,   
denies hot flashes.  
Eyes: denies eye   
problems.  
Gastrointestinal:   
denies nausea,   
denies vomiting.  
Cardiovascular:   
denies chest pain or   
angina.  
Integumentary: no   
dryness  
Musculoskeletal:   
denies   
musculoskeletal   
symptoms.  
ENMT: denies   
otolaryngeal   
symptoms.  
Respiratory: no   
shortness of breath.  
Heme/Lymph: denies   
easy bleeding   
tendency, denies   
easy bruising   
tendency.  
Psychiatric: no   
confusion, no   
anxiety.  
Genitourinary:   
denies dysuria, mild   
hematuria, denies   
discharge, denies   
urinary frequency,   
denies urinary   
hesitancy, denies   
nocturia, denies   
incontinence, denies   
genital sores,   
denies decreased   
libido, and denies   
erectile   
dysfunction.  
Physical Exam  
Vitals &   
Measurements  
HR: 76(Peripheral)   
BP: 138/72  
HT: 168 cm HT: 168.0   
cm WT: 102.1 kg WT:   
102.1 kg BMI: 36.17  
General Appearance:   
alert, no distress,   
well nourished, well   
developed male.  
Head: normocephalic   
.  
Eyes: normal orbit   
and globe.  
ENMT: normal   
examination of   
external ears.  
Chest: Lungs CTA,   
respirations non   
labored.  
Cardiovascular:   
regular rate and   
rhythm.  
Abdomen: soft, non   
distended, no   
tenderness, no mass   
or organomegaly, no   
hernia.  
Genitourinary:   
normal scrotum,   
abnormal testes the   
right testicle is of   
identifiable. He has   
a history of a right   
orchidopexy but I   
could not identify   
testicle on the   
right side. There is   
a scar from his   
previous surgery.   
The left testicle is   
present with small.,   
normal urethra,   
normal epididymis,   
normal vas   
deferens/spermatic   
cord. Today's exam -   
undescended testicle   
on the right.  
Flank Pain: none.  
Bladder:   
nonpalpable.  
Penis: normal shaft,   
normal glans.  
Lymph Nodes:   
unremarkable   
palpation of the   
cervical area.  
Skin: warm, dry, no   
bruising.  
Psychiatric:   
cooperative, affect   
appropriate for age,   
normal judgement,   
euthymic mood.  
Assessment/Plan  
This is a patient   
with a history of   
gross painless   
hematuria. He is a   
longtime cigarette   
smoker and does take   
an aspirin once a   
day for the atrial   
fibrillation. He has   
complaints of   
infertility. He is   
being scheduled for   
cystoscopic   
examination, CT scan   
with contrast and   
urine cytology to   
complete the   
hematuria work-up.   
Physical exam shows   
an undescended right   
testicle. He states   
he had a surgical   
procedure many years   
ago but I cannot   
identify the   
testicle on physical   
exam. The left   
testicle is smaller   
than normal and this   
may be contributing   
to infertility.   
Semen analysis has   
been ordered at the   
patient's request.   
Preop antibiotics   
have been ordered. I   
answered all of his   
questions. Informed   
consent has been   
obtained.  
1. Gross hematuria   
(R31.0: Gross   
hematuria)  
Pt. states noticing   
blood a month ago   
that lasted for   
three days. UA today   
- small. Will send   
UA today for   
FISH/Cyto. Pt. is to   
obtain a CT and will   
schedule pt. for   
Cystoscopy. The   
risks and benefits   
for cystoscopy have   
been discussed. The   
risks include   
bleeding, infection,   
and irritation of   
the bladder and   
urinary channel,   
among others. The   
patient, after being   
informed of   
procedural details   
and after questions   
have been answered,   
wishes to proceed.   
Full informed   
consent has been   
obtained. Will order   
Local anesthesia.   
ABX sent to Medicine   
[facility] in [place].  
Ordered:  
Body Mass Index   
(BMI) documented   
3008F  
CT Abdomen/Pelvis w/   
Contrast  
Semen Analysis   
Motility/Count  
Urnls Dip Stick Auto   
w/o M (more content   
not included)...    Normal                                  [facility]  
   
                                        Comment on above:   Result Comment: Elec  
tronically Signed By: Can Phelps MD, Branden LENZ\.br\Date and Time Signed: 21 10:21 EDT\.br\Electronically   
Co-Signed By: Danya Rosales MA\.br\Date and Time Co-Signed:   
21 10:15 EDT   
  
  
  
Vital Signs  
  
  
                          Date Time    Vital Sign   Value        Performing   
Clinician                               Facility  
   
                                                    2024   
09:      Body height     170.18 cm                       [facility]  
   
                                                    2024   
09:      Body temperature 96.7 [degF]                     [facility]  
   
                                                    2024   
09:                              Diastolic blood   
pressure            84 mm[Hg]                               [facility]  
   
                                                    2024   
09:      Heart rate      60 /min                         [facility]  
   
                                                    2024   
09:      Respiratory rate 18 /min                         [facility]  
   
                                                    2024   
09:                              SaO2% (BldA) [Mass   
fraction]           95 %                                    [facility]  
   
                                                    2024   
09:                              Systolic blood   
pressure            130 mm[Hg]                              [facility]  
   
                                                    2024   
14:          Body height         170.18 cm           MD Breonna Mcmullen  
Work Phone:   
[phone]                          [facility]  
   
                                                    2024   
14:                              Body mass index   
(BMI) [Ratio]             31.3 kg/m2                MD Breonna Mcmullen  
Work Phone:   
[phone]                          [facility]  
   
                                                    2024   
14:          Body temperature    98.2 [degF]         MD Breonna Mcmullen  
Work Phone:   
[phone]                          [facility]  
   
                                                    2024   
14:          Body weight         90.71 kg            MD Breonna Mcmullen  
Work Phone:   
[phone]                          [facility]  
   
                                                    2024   
14:          Heart rate          75 /min             MD Breonna Mcmullen  
Work Phone:   
[phone]                          [facility]  
   
                                                    2024   
14:          Respiratory rate    18 /min             MD Breonna Mcmullen  
Work Phone:   
[phone]                          [facility]  
   
                                                    2024   
14:                              SaO2% (BldA) [Mass   
fraction]                 98 %                      MD Breonna Mcmullen  
Work Phone:   
[phone]                          [facility]  
   
                                                    01-   
14:          Body height         170.18 cm           Isreal Bautista  
Other Phone:   
[phone]                           [facility]  
   
                                                    01-   
14:                              Diastolic blood   
pressure                  90 mm[Hg]                 Isreal Bautista  
Other Phone:   
[phone]                           [facility]  
   
                                                    01-   
14:                              SaO2% (BldA) [Mass   
fraction]                 99 %                      Isreal Bautista  
Other Phone:   
[phone]                           [facility]   
[employer]  
Other Phone:   
[phone]  
   
                                                    01-   
14:                              Systolic blood   
pressure                  130 mm[Hg]                Isreal Bautista  
Other Phone:   
[phone]                           [facility]  
   
                                                    2023   
09:          Body height         170.18 cm           Kary Teamly  
Other Phone:   
[phone]                           [facility]  
   
                                                    2023   
09:                              Body mass index   
(BMI) [Ratio]             32.57 kg/m2               [facility]  
Other Phone:   
[phone]                           [facility]  
Other Phone:   
[phone]  
   
                                                    2023   
09:          Body weight         94.35 kg            Kary Teamly  
Other Phone:   
[phone]                           [facility]  
Other Phone:   
[phone]  
   
                                                    2023   
09:          Body weight         94.34 kg            MD Breonna Mcmullen  
Work Phone:   
[phone]                          [facility]  
   
                                                    2023   
14:          Body height         170.18 cm           Sangita Moscoso  
Other Phone:   
[phone]                           [facility]  
Other Phone:   
[phone]  
   
                                                    2023   
14:                              Body mass index   
(BMI) [Ratio]             32.57 kg/m2               Sangita Danis  
Other Phone:   
[phone]                           [facility]  
Other Phone:   
[phone]  
   
                                                    2023   
14:          Body temperature    99.5 [degF]         Sangita Moscoso  
Other Phone:   
[phone]                           [facility]  
Other Phone:   
[phone]  
   
                                                    2023   
14:          Body weight         94.35 kg            Sangita Moscoso  
Other Phone:   
[phone]                           [facility]  
Other Phone:   
[phone]  
   
                                                    2023   
14:                              Diastolic blood   
pressure                  76 mm[Hg]                 Sangita Moscoso  
Other Phone:   
[phone]                           [facility]  
Other Phone:   
[phone]  
   
                                                    2023   
14:          Respiratory rate    18 /min             Sangita Moscoso  
Other Phone:   
[phone]                           [facility]  
Other Phone:   
[phone]  
   
                                                    2023   
14:                              SaO2% (BldA) [Mass   
fraction]                 98 %                      Sangita Moscoso  
Other Phone:   
[phone]                           [facility]  
Other Phone:   
[phone]  
   
                                                    2023   
14:                              Systolic blood   
pressure                  112 mm[Hg]                Sangita Moscoso  
Other Phone:   
[phone]                           [facility]  
Other Phone:   
[phone]  
   
                                                    2022   
15:          Body height         167.64 cm           Breonna Mcmullen  
Work Phone:   
[phone]                          [facility] [address] [facility]  
Work Phone:   
[phone]  
   
                                                    2022   
15:                              Body mass index   
(BMI) [Ratio]             34.38 kg/m2               Breonna Mcmullen  
Work Phone:   
[phone]                          [facility]   
[employer] [address]  
Work Phone:   
[phone]  
   
                                                    2022   
15:                              Body surface area   
Derived from formula      2.05 m2                   Breonna Mcmullen  
Work Phone:   
[phone]                          [facility] [employer] [address] [facility]  
Work Phone:   
[phone]  
   
                                                    2022   
15:          Body weight         96.62 kg            Breonna Mcmullen  
Work Phone:   
[phone]                          [facility] [employer] [address] [facility]  
Work Phone:   
[phone]  
   
                                                    2022   
15:                              Diastolic blood   
pressure                  81 mm[Hg]                 Breonna Mcmullen  
Work Phone:   
[phone]                          [facility] [place]   
[facility] [address] [facility]  
Work Phone:   
[phone]  
   
                                                    2022   
15:          Heart rate          67 /min             Breonna Mcmullen  
Work Phone:   
[phone]                          [facility]   
[address]  
Work Phone:   
[phone]  
   
                                                    2022   
15:                              Systolic blood   
pressure                  126 mm[Hg]                Breonna Mcmullen  
Work Phone:   
[phone]                          [facility]   
[facility] [address] [facility]  
Work Phone:   
[phone]  
   
                                                    2021   
15:          Body height         170.18 cm           Yvrose Marshalladeline  
Other Phone:   
[phone]                           [place]   
[employer]   
[employer]  
Other Phone:   
[phone]  
   
                                                    2021   
15:                              Body mass index   
(BMI) [Ratio]             33.59 kg/m2               Yvrose De  
Other Phone:   
[phone]                           [facility]  
Other Phone:   
[phone]  
   
                                                    2021   
15:          Body temperature    98.9 [degF]         Yvrose Marshalladeline  
Other Phone:   
[phone]                           [facility]  
Other Phone:   
[phone]  
   
                                                    2021   
15:          Body weight         97.3 kg             Yvrose Marshalladeline  
Other Phone:   
[phone]                           [facility]  
Other Phone:   
[phone]  
   
                                                    2021   
15:                              Diastolic blood   
pressure                  80 mm[Hg]                 Yvrose De  
Other Phone:   
[phone]                           [facility]  
Other Phone:   
[phone]  
   
                                                    2021   
15:                              SaO2% (BldA) [Mass   
fraction]                 99 %                      Yvrose Marshalladeline  
Other Phone:   
[phone]                           [facility]  
Other Phone:   
[phone]  
   
                                                    2021   
15:                              Systolic blood   
pressure                  105 mm[Hg]                Yvrose De  
Other Phone:   
[phone]                           [facility]  
Other Phone:   
[phone]  
  
  
  
Encounters  
  
  
                          Encounter Date Encounter Type Care Provider Facility  
   
                                                    Start: 2024  
End: 2024     ambulatory          BREONNA MCMULLEN       Not Available  
   
                                                    Start: 2024  
End: 2024     ambulatory          BREONNA MCMULLEN       Not Available  
   
                                                    Start: 2024  
End: 2024     ambulatory                              [facility]  
Work Phone:   
[phone]  
   
                                                    Start: 2024  
End: 2024                         Patient encounter   
procedure                                           [facility]   
[facility]   
[place]  
Work Phone:   
[phone]  
   
                                                    Start: 2024  
End: 2024           ambulatory                MD Breonna Mcmullen  
Work Phone:   
[phone]                          [facility]  
Work Phone:   
[phone]  
   
                                                    Start: 2024  
End: 2024                         Patient encounter   
procedure                               MD Breonna Mcmullen  
Work Phone:   
[phone]                          [facility]   
[facility]   
[place]  
Work Phone:   
[phone]  
   
                                                    Start: 2024  
End: 2024           ambulatory                Kary White  
Other Phone:   
[phone]                           [facility]  
Other Phone:   
[phone]  
   
                          Start: 2024 Telephone encounter Kary White  
 [facility]  
   
                                                    Start: 01-  
End: 01-           ambulatory                Isreal Bautista  
Other Phone:   
[phone]                           [facility]  
Other Phone:   
[phone]  
   
                                        Start: 01-   Office consultation   
new/estab patient 60   
min                       Isreal Bautista               [facility]  
   
                                                    Start: 01-  
End: 01-                         Patient encounter   
procedure                               MD Breonna Mcmullen  
Work Phone:   
[phone]                          [facility]   
[facility]  
Work Phone:   
[phone]  
   
                                                    Start: 12-  
End: 12-     ambulatory          BREONNA MCMULLEN       Not Available  
   
                                        Start: 2023   Office outpatient ne  
w   
45 minutes                Kary White          [facility]   
Neurosurgery  
   
                                                    Start: 2023  
End: 2023     ambulatory          Kary White    Facility:[facility]  
   
                                                    Start: 2023  
End: 2023           ambulatory                MD Breonna Mcmullen  
Work Phone:   
[phone]                          [facility]  
Work Phone:   
[phone]  
   
                                                    Start: 2023  
End: 2023                         Patient encounter   
procedure                               MD Breonna Mcmullen  
Work Phone:   
[phone]                          [facility]   
[place]  
Work Phone:   
[phone]  
   
                                                    Start: 2023  
End: 2023                         Patient encounter   
procedure                               MD Breonna Mcmullen  
Work Phone:   
[phone]                          [facility]   
[facility]  
Work Phone:   
[phone]  
   
                                                    Start: 2023  
End: 2023     ambulatory          KARY MCCLAINRAMONA    Not Available  
   
                                                    Start: 2023  
End: 2023           ambulatory                Sangita Moscoso  
Other Phone:   
[phone]                           [facility]   
Professional   
[facility]  
Other Phone:   
[phone]  
   
                                        Start: 2023   Office outpatient vi  
sit   
25 minutes                Sangita Moscoso              [facility] [place]  
   
                                                    Start: 2023  
End: 2023     ambulatory          BERNARDINO CONKLIN .         Facility:[facility]  
   
                          Start: 2023 ambulatory   Dr. Jhony Richardson   
Facility:  
   
                                                    Start: 2022  
End: 2022     ambulatory          AMINA GONZALEZ     Facility:[facility]  
   
                                                    Start: 2022  
End: 2022                         Emergency department   
patient visit             [facility]  
   
                          Start: 2022 ambulatory   DR BREONNA MCMULLEN Facility  
:[facility]  
   
                                                    Start: 2022  
End: 2022     ambulatory          DR NATANAEL BELLO Facility:[facility]  
   
                                                    Start: 2022  
End: 2022     ambulatory          DR BREONNA MCMULLEN      Facility:[facility]  
   
                                                    Start: 2022  
End: 2022     ambulatory          TUTU MCCLOUD  Facility:[facility]  
   
                                        Start: 2022   Office outpatient vi  
sit   
25 minutes                              Breonna Mcmullen  
Work Phone:   
[phone]                          [facility]   
[facility] [address]  
Work Phone:   
[phone]  
   
                                        Start: 2021   Office outpatient ne  
w   
30 minutes                [facility]   
[facility]  
  
  
  
Procedures  
  
  
                          Date         Procedure    Procedure Detail Performing   
Clinician  
   
                                        Start: 2023   X-ray of lumbar spin  
e,   
six views including   
bending views                                       MD Breonna Mcmullen  
Work Phone:   
[phone]  
   
                                       Bone marrow sampling              Breonna Mcmullen  
Work Phone:   
[phone]  
   
                                       Operation on mouth              Breonna Mcmullen  
Work Phone:   
[phone]  
   
                                                            Operation on the inn  
er   
ear                                                 Breonna Mcmullen  
Work Phone:   
[phone]  
   
                                                            Scrotum and testicle  
   
operation                                           Breonna cMmullen  
Work Phone:   
[phone]  
   
                                                    NEGATED: Highlighted   
row has not occurred! Total colonoscopy                       Breonna Mcmullen  
Work Phone:   
[phone]  
  
  
  
Plan of Treatment  
  
  
                          Date         Care Activity Detail       Author  
   
                                        Start: 2023   FUV, Provider:   
Jhony Richardson,   
Status: Pen, Time: 1:00   
PM                                      FUV, Provider:   
Jhony Richardson,   
Status: Pen, Time:   
1:00 PM                                 -[facility]   
[facility] [address] DO  
Work Phone:   
[phone]  
  
  
  
Payers  
  
  
                          Date         Payer Category Payer        Policy ID  
   
                          2023   Self-pay                  [id number]  
[id number]  
   
                          10-   [facility]              [id number]  
[id number]  
   
                          2022   Unknown                   [id number]  
   
                          2021   Medicare                  [id number]  
[id number]  
   
                          2019   Medicare                  [id number]   
[id number]  
   
                          1979   Unknown                   [id number] 2.16.8  
[id number]  
   
                          1979   Unknown                   [id number] 2.16.  
[id number]  
   
                          1979   Unknown                   [id number] 2.16.84  
[id number]  
   
                          1979   Unknown                   [id number] 2.16.84  
[id number]  
   
                          1979   Unknown                   [id number] 2.16.84  
[id number]  
   
                          1979   Unknown                   [id number] 2.16.84  
[id number]  
   
                          1979   Unknown                   [id number] 2.16.84  
[id number]  
   
                          1979   Unknown                   [id number] 2.16.84  
[id number]  
   
                          1979   Unknown                   [id number] 2.16.84  
[id number]  
   
                          1979   Unknown                   [id number] 2.16.84  
[id number]  
   
                          1979   Unknown                   [id number] 2.16.840  
[id number]  
   
                          1979   Unknown                   [id number] 2.16.840  
[id number]  
   
                          1960   Medicaid                  [id number] 2.  
[id number]  
   
                          1960   Private Health Insurance              [id number]  
[id number] 1960   Unknown                   [id number]  
   
                          1960   Unknown                   [id number]  
   
                                       [employer]              [id number]  
[id number]  
   
                                       Private Health Insurance              U  
[id number]  
   
                                       Unknown                     
   
                                       Unknown                   [id number] 2.16.8  
[id number]  
  
  
  
Social History  
  
  
                          Date         Type         Detail       Facility  
   
                                       No alcohol use No alcohol use [facility]  
 [facility]   
[facility]  
Other Phone:   
[phone]  
   
                                        Comment on above:   1 cup-2 cups coffee   
in AM, 2-3 POPS A DAY;   
   
                                       Sex Assigned At Birth Sex Assigned At Bir  
th [facility]  
Other Phone:   
[phone]  
   
                          Start: 1980 Sex Assigned At Birth Male         F  
[facility]  
   
                                                    Start: 2013  
End: 2013                         Tobacco smoking   
status NHIS               Smoker (finding)          [facility]  
  
  
  
Evaluation note 01-  
  
  
                                Note Date & Type Note            Facility  
  
  
  
                                                    01- Evaluation note   
  
  
  
                                                    Encounter   
Date                      Diagnosis                 Assessment Notes  
   
                                                    15 Speedy,   
2024                                    Lumbar   
radiculopathy   
(ICD-10 -   
M54.16)                                                     42 y/o male here   
to discuss his   
chronic pain. He   
states approx. 1   
year ago he   
experienced low   
back and   
intermittent   
bilateral lower   
extremity pain.   
He states he   
attempted PT at   
the onset of pain   
which provided   
minimal   
improvement in   
his symptoms. He   
notes he recently   
purchased a new   
bed and no longer   
has pain. Prior   
to examining the   
patient, I   
reviewed progress   
notes from his   
referring   
provider Kary White.   
Pertinent imaging   
of the lumbar   
spine was   
reviewed and   
discussed in   
detail with the   
patient which   
shows   
degenerative   
changes. Anatomy   
of spine   
discussed in   
detail with   
patient in   
regards to   
patients   
condition.   
Overall, he   
appears to be   
doing well and   
does not require   
interventional   
treatment at this   
time. He is   
encouraged to   
call the office   
if his symptoms   
return  
  
   
                                                    15 Speedy,   
2024                                    Sacroiliitis   
(ICD-10 - M46.1)                                            In the future if   
the pain   
persists, we can   
consider   
proceeding with a   
sacroiliac joint   
injection under   
fluoroscopic   
guidance.  
  
   
                                                    15 Speedy,   
2024                                    Chronic pain   
(ICD-10 -   
G89.29)                                                     UDS performed   
through   
[facility]   
today, will await   
confirmatory   
results. Patient   
is encouraged to   
call the office   
if his symptoms   
return  
  
   
                                                    15 Speedy,   
2024            Other                                           Medical decision  
   
making shows a   
new problem to me   
with further   
workup planned or   
suggested with   
the potential for   
extensive   
treatment options   
that were   
considered with   
the most   
applicable given   
this patient's   
situation as   
noted above.   
Treatment options   
considered   
include a   
combination of   
physical therapy   
approaches,   
pharmacologic   
management, and   
interventional   
procedures. Those   
most applicable   
to the patient   
were discussed at   
this time. Risk   
of complications   
and/or morbidity   
and mortality is   
high given that   
acute and chronic   
pain poses a   
threat to life   
and bodily   
function if   
undertreated,   
poorly treated or   
with failure to   
maintain adequate   
treatment and   
timely followup.   
Given the serious   
and fluctuating   
nature of pain   
with extensive   
consideration for   
whenever pain   
changes, there   
always remains   
the possibility   
of prolonged   
functional   
impairment   
requiring   
constant patient   
reassessment and   
high-level   
medical decision   
making. The   
amount and   
complexity of   
data reviewed is   
high given that   
patient labs,   
radiology   
reports, and   
other test were   
obtained,   
reviewed and   
summarized as   
applicable from   
the physician   
portal and/or   
outside medical   
records.   
Pertinent   
positive and   
negative findings   
were considered   
in medical   
decision-making.  
  
  
[facility]  
Other Phone: [phone]  
  
Evaluation note 2023  
  
  
                                Note Date & Type Note            Facility  
  
  
  
                                                    2023 Evaluation note   
  
  
  
                                                    Encounter   
Date                      Diagnosis                 Assessment   
Notes  
   
                                                    11 Dec,   
2023                                    Lumbar   
radiculopathy   
(ICD-10 -   
M54.16)                                             Independently   
reviewed the MRI   
of the lumbar   
spine from   
2023 with   
patient   
face-to-face and   
which shows at   
the L3-4 there is   
a mild to   
moderate disc   
space narrowing   
with mild diffuse   
disc herniation   
and mild to   
moderate   
hypertrophic   
facet limited to   
flavum changes   
with mild central   
spinal stenosis   
and   
neuroforaminal   
narrowing. At   
L4-5 there is a   
grade 1   
anterolisthesis   
mild disc space   
narrowing with   
moderate disc   
herniation and   
marked   
hypertrophic   
facet changes   
with results in   
mild to moderate   
central spinal   
canal stenosis   
lateral recess   
neuroforaminal   
narrowing greater   
on the right,   
consistent with   
the patient's   
radicular   
symptoms. At   
L5-S1 there is a   
mild disc   
herniation   
without   
significant   
central spinal   
stenosis.   
Discussion of   
conservative   
thearpy in which   
patient has had   
[facility]   
in [place], with   
minimal relief.   
Will get release   
of information   
for continuity of   
care. WIll order   
xray of lumbar   
6view today. PDMP   
reviewed no   
controlled   
prescriptions.   
Pharmacological   
managment will   
continue with   
current   
medication as   
prescribed. Will   
refer to Dr Bautista   
for sacroiliac   
injections.   
Follow up in 12   
weeks.  
Medical decision   
making shows a  
new problem to me   
with further   
workup planned or   
suggested with   
the potential  
for extensive   
treatment options   
that were   
considered with   
the most   
applicable  
given this   
patient's   
situation as   
noted above.   
Treatment options   
considered  
include a   
combination of   
physical therapy   
approaches,   
pharmacologic   
management,  
and   
interventional   
procedures. Those   
most applicable   
to the patient   
were  
discussed at this   
time. Risk of   
complications   
and/or morbidity   
and mortality is  
high given that   
acute and chronic   
pain poses a   
threat to life   
and bodily   
function  
if undertreated,   
poorly treated or   
with failure to   
maintain adequate   
treatment  
and timely follow   
up. Given the   
serious and   
fluctuating   
nature of pain   
with  
extensive   
consideration for   
whenever pain   
changes, there   
always remains   
the  
possibility of   
prolonged   
functional   
impairment   
requiring   
constant patient  
reassessment and   
high-level   
medical decision   
making. The   
amount and   
complexity  
of data reviewed   
is moderate given   
that patient   
labs, radiology   
reports, and  
other test were   
obtained,   
reviewed and   
summarized as   
applicable from   
the  
physician portal   
and/or outside   
medical records.   
Pertinent   
positive and  
negative findings   
were considered   
in medical   
decision-making.  
                                          
   
                                                    11 Dec,   
2023                                    [place] toe,   
right (ICD-10 -   
M20.5X1)                                                      
   
                                                    11 Dec,   
2023                                    [place] toe, left   
(ICD-10 -   
M20.5X2)                                                      
   
                                                    11 Dec,   
2023                                    Sacroiliac   
inflammation   
(ICD-10 - M46.1)                                              
  
  
[facility]  
Other Phone: [phone]  
  
Evaluation note 2023  
  
  
                                Note Date & Type Note            Facility  
  
  
  
                                                    2023 Evaluation note   
  
  
  
                                                    Encounter   
Date                      Diagnosis                 Assessment Notes  
   
                                                    12 Sep,   
2023                                    Sore   
throat   
(ICD-10 -   
J02.9)                                                        
   
                                                    12 Sep,   
2023                                    Viral URI   
with cough   
(ICD-10 -   
J06.9)                                                      Advised patient   
that COVID PCR   
test and rapid  
Strep test was   
negative today.   
Advised patient   
that will treat   
as viral  
URI. Supportive   
care as directed,   
increase fluids   
and rest, Tylenol   
as  
directed, rx of   
Bromfed and   
prednisone, cool   
mist  
humidifier,   
throat lozenges.   
Discussed   
infection control   
practices such as   
good  
hand washing and   
mask wearing.   
Work note   
provided, no   
extension   
allowed. Patient   
to follow up with   
PCP if symptoms  
persist or worsen   
despite   
treatment.   
Immediate eval   
for SOB,   
difficulty  
breathing, chest   
pain, fevers that   
do not break with   
antipyretic or   
any other  
concerning   
symptoms as   
reviewed on   
patient education   
handout. Patient  
verbalizes   
understanding and   
is agreeable to   
treatment plan.   
Patient left in  
stable condition  
  
  
  
[facility]  
Other Phone: [phone]  
  
Evaluation note 2021  
  
  
                                Note Date & Type Note            Facility  
  
  
  
                                                    2021 Evaluation note   
  
  
  
                                                    Encounter   
Date                      Diagnosis                 Assessment   
Notes  
   
                                                                                        Obstructive   
sleep apnea   
(ICD-10 -   
G47.33)                                               
  
  
Discussed the   
diagnosis of   
obstructive   
sleep apnea,   
findings on   
previous sleep   
study which was   
obtained from   
[facility]   
[facility],   
persistent   
symptoms,   
reevaluating   
the presence   
and severity of   
sleep   
disordered   
breathing   
specially for   
insurance   
requirements to   
be able to   
initiate   
positive   
pressure   
therapy.                                  
  
  
  
  
[facility]  
Other Phone: [phone]  
  
Evaluation note  
  
  
                                Note Date & Type Note            Facility  
   
                                Evaluation note No assessment information [facility]  
Work Phone: [phone]  
  
  
  
Evaluation note  
  
  
                                Note Date & Type Note            Facility  
   
                                Evaluation note No Information  [facility]  
Other Phone: [phone]  
  
  
  
History general Narrative - Reported  
  
  
                                Note Date & Type Note            Facility  
  
  
  
                                                    History general Narrative -   
Reported   
  
  
  
                                                    Type  
   
                                Medical History epilepsy          
   
                                Medical History herniated disc    
   
                                        Surgical History    abdominal surgery (t  
esticals tied to   
pelvic bone)                              
   
                                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   
ear   
   
                                Hospitalization History abdominal surgery   
   
                                Hospitalization History bone marrow transplant 1  
995  
  
  
[facility]  
Other Phone: [phone]  
  
History general Narrative - Reported  
  
  
                                Note Date & Type Note            Facility  
  
  
  
                                                    History general Narrative -   
Reported   
  
  
  
                                                    Type  
   
                                Medical History epilepsy          
   
                                Medical History herniated disc    
   
                                Medical History heart disease     
   
                                        Surgical History    abdominal surgery (t  
esticals tied to   
pelvic bone)                              
   
                                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   
ear   
   
                                Hospitalization History abdominal surgery   
   
                                Hospitalization History bone marrow transplant 1  
995  
  
  
[facility]  
Other Phone: [phone]  
  
History of Present illness Narrative  
  
  
                                Note Date & Type Note            Facility  
   
                                                    History of Present illness   
Narrative                               Patient is here for follow-up he   
was evaluated in the past for   
symptoms of palpitation,   
obesity, shortness of breath and   
hyperlipidemia. Since last time   
I saw him he reports that he   
developed Covid. He was having   
some shortness of breath and   
cough. Also had few episodes of   
palpitation. He denies   
lightheadedness, dizziness or   
syncope. Apparently he is was   
seen by his family physician and   
a stress test was recommended   
but the patient failed to show   
up for that. His recent sleep   
study was inadequate and he is   
telling me that he will repeated   
in the near future.Assessment1.   
Palpitation . His event monitor   
despite complaint of palpitation   
failed to demonstrate any   
significant arrhythmia. Patient   
mother indicated that he does   
have an element of anxiety. And   
documentation of recurrence   
atrial fibrillation2. Prior   
history of one episode of atrial   
fibrillation with low chads   
vascular score3. Obesity   
significant weight changes4.   
Hyperlipidemia on treatment5.   
Shortness of breath related to   
prior tobacco use6. Tobacco   
use7. Patient report prior   
diagnosis of sleep apnea is   
scheduled to undergo repeat   
study in the near future8.   
Recent COVID-19 infectionPlan1.   
Patient was counseled regarding   
risk factor modification2.   
Patient was advised to remain on   
an aspirin 81 mg once daily3.   
Patient was counseled regarding   
losing weight, exercise and   
dietary modification4. With   
repeating his sleep study5. I do   
not find me if he develop any   
recurrence of his symptoms of   
palpitation or chest pain6. I   
suggested 6 month follow-up and   
I advised the patient to try to   
lose 10 pounds by next time7. We   
will see him in 1 year in   
follow-up or earlier if the need   
arise                                   [facility] [address] DO  
Work Phone: [phone]  
  
  
  
Reason for visit Narrative  
  
  
                                Note Date & Type Note            Facility  
   
                                        Reason for visit Narrative [facility]NAGEME  
NT REFERRAL   
CONSULT                                 [facility]  
Other Phone: [phone]  
  
  
  
Chief Complaint  
NATANEAL GANDHI is being seen for a 6 month follow-up of.  
  
Family History  
No Family History Records FoundUnknown Family Member  
  
                                Name            Dates           Details  
   
                                                    Family history of cerebrovas  
cular accident (CVA): Father(V17.1,   
Z82.3)  
                                                    Status:Active  
   
                                                    Family history of diabetes m  
ellitus: Father(V18.0, Z83.3)  
                                                    Status:Active  
  
  
  
                          Relationship Condition    Age at Onset Recorded Date/T  
gris  
   
                          Not Specified Heart disease Unknown        
  
  
  
Summary Purpose  
  
  
                                                      
  
  
  
Advance Directives  
No Advanced Directives Records Found  
  
                                Advance Directive Response        Recorded Date/  
Time  
   
                                Advance Directives No              2023 4:42pm  
  
  
  
                                Advance Directive Response        Recorded Date/  
Time  
   
                                Advance Directives No              2023 5:42pm  
  
  
  
Chief Complaint and Reason for Visit  
  
  
                                        Chief Complaint     M54.16  
  
  
  
                                        Chief Complaint     Referred By Dr. Mcmullen  
 Acute Low Back Pain  
M54.16  
Reff By Kary White, Consult For Inna  
left thigh pain  
  
  
  
                                        Chief Complaint     left thigh pain  
Nausea, Loose stool  
  
  
  
Reason for Referral  
  
  
                                        Reason              evaluate and treat  
   
                                        Diagnosis 1         Lumbar radiculopathy  
 (M54.16)  
   
                                        Referral Organization [facility]  
urosurgery  
   
                                        Referring Provider First Name Kary  
   
                                        Referring Provider Last Name Christopher  
   
                                        Referring Provider Specialty Nurse Pract  
itioner  
   
                                        Referred Organization [facility]  
t  
   
                                        Referred Provider   Isreal Bautista  
   
                                        Referred Address    [address],[address]  
   
                                        Referred Provider [facility]ty [facility]  
e  
   
                                        Referral Priority   Routine  
   
                                        Referral Appointment Date 2024-01-15  
   
                                        General Notes       Felicia Crowder  11:41:15 AM >received   
today, MRI was completed at [facility], sending p2p at this   
time for scheduling  
Felicia Crowder 2023 01:08:17 PM >pt has been   
scheduled  
  
  
  
Additional Source Comments  
  
  
  
                                                    PREGNANCY (unrecognized sect  
ion and content)  
   
                                                    No Pregnancy Status Records   
FoundNo Pregnancy Status Records FoundNo Pregnancy   
Status   
Records FoundNo Pregnancy Status Records FoundNo Pregnancy Status Records 
FoundNo   
Pregnancy Status Records FoundNo Pregnancy Status Records FoundNo Pregnancy 
Status   
Records Found  
  
  
  
  
                                                    INFORMATION SOURCE (unrecogn  
ized section and content)  
   
                                          
  
  
  
                                        DATE CREATED        AUTHOR  
   
                                2022                      Margarito Egan [facility]  
  
  
  
                                DATE CREATED    AUTHOR          AUTHOR'S ORGANIZ  
ATION  
   
                                08/10/2022                      [facility]  
dical Specialist  
  
  
  
                                DATE CREATED    AUTHOR          AUTHOR'S ORGANIZ  
ATION  
   
                                2022                      [employer]  
  
  
  
                                DATE CREATED    AUTHOR          AUTHOR'S ORGANIZ  
ATION  
   
                                02/15/2023                      [facility]  
  
  
  
                                DATE CREATED    AUTHOR          AUTHOR'S ORGANIZ  
ATION  
   
                                02/15/2023                       [facility]  
  
  
  
                                DATE CREATED    AUTHOR          AUTHOR'S ORGANIZ  
ATION  
   
                                2023                      [facility]  
[facility]  
  
  
  
                                DATE CREATED    AUTHOR          AUTHOR'S ORGANIZ  
ATION  
   
                                2024                      [facility]  
  
  
  
                                DATE CREATED    AUTHOR          AUTHOR'S ORGANIZ  
ATION  
   
                                2024                      [facility]  
dical Specialists [facility]  
  
  
  
  
  
                                                    REASON FOR VISIT (unrecogniz  
ed section and content)  
   
                                                    SLEEP LABPOSSIBLE STREP THRO  
AT, SWOLLEN GLANDSreferred by Dr. Mcmullen acute low   
back   
pain w/sciaticaREFF BY KARY WHITE, CONSULT FOR LUMBAR RADICULOPATHY  
  
  
  
  
                                                    Care Teams (unrecognized sec  
tion and content)  
   
                                          
  
  
  
                                                    Team Status: Active   
   
                          Member       Role         Status       Dates  
   
                          Breonna Mcmullen MD Primary Care Provider Active         
  
  
  
                                                    Team Status: Inactive   
   
                          Member       Role         Status       Dates  
   
                          Breonna Mcmullen MD Primary Care Provider Active         
   
                          ARNOL Benitez Attending Provider Active       
    
  
  
  
                                                    Team Status: Inactive   
   
                          Member       Role         Status       ARNOL Quijano Attending Provider Active       
  Start: 2023  
End: 2023  
  
  
  
                                                    Team Status: Inactive   
   
                          Member       Role         Status       Álvaro Mcmullen MD Primary Care Provider Active       St  
art: 2023  
End: 2023  
   
                          ARNOL Benitez Attending Provider Active       
  Start: 2023  
End: 2023  
  
  
  
                                                    Team Status: Inactive   
   
                          Member       Role         Status       Dates  
   
                          Isreal Bautista MD Attending Provider Active       Sta  
rt: 2024  
End: 2024  
  
  
  
                                                    Team Status: Inactive   
   
                          Member       Role         Status       Álvaro Mcmullen MD Primary Care Provider Active       St  
art: 2024  
End: 2024  
   
                          LEONARD Mckeon Attending Provider Active         
Start: 2024  
End: 2024  
  
  
  
                                                    Team Status: Inactive   
   
                          Member       Role         Status       Álvaro Mcmullen MD Primary Care Provider Active       St  
art: 2024  
End: 2024  
   
                          ARNOL Weller Attending Provider Active       S  
tart: 2024  
End: 2024  
  
  
  
  
  
                                                    Goals (unrecognized section   
and content)  
   
                                                    Goals may be documented in a  
n alternate section  
  
FOR RECORDS PERTAINING TO PATIENTS WHO ARE OR HAVE BEEN ENROLLED IN A CHEMICAL 
DEPENDENCY/SUBSTANCEABUSE PROGRAM, SOME INFORMATION MAY BE OMITTED. This 
clinical summary was aggregated from multiple sources. Caution should be 
exercised in using it in the provision of clinical care. This summary normalizes
 information from multiple sources, and as a consequence, information in this 
document may materially change the coding, format and clinical context of 
patient data. In addition, data may be omitted in some cases. CLINICAL DECISIONS
 SHOULD BE BASED ON THE PRIMARY CLINICAL RECORDS. [employer] [facility] provides
 no warranty or guarantee of the accuracy or completeness of information in this
 document.

## 2024-10-04 NOTE — CT_ITS
[place] [id number] 
     [phone] 
  
  
Patient Name: 
CANDE GANDHI 
  
MRN: [id number]    YOB: 1980    Sex: M 
Assigned Patient Location: ER 
Current Patient Location: .MAIN 
Accession/Order Number: [id number] 
Exam Date: 10/04/2024  09:01    Report Date: 10/04/2024  09:24 
  
At the request of: 
AMINA GONZALEZ   
  
Procedure:  CT abdomen pelvis wo con 
  
EXAMINATION: CT abdomen pelvis wo con  
  
HISTORY: left flank pain, r/o stone 
  
COMPARISON: No relevant comparison available.  
  
TECHNIQUE: Axial, Coronal, and Sagittal images were obtained without and/or  
with IV contrast as indicated by examination type. Dose reduction techniques  
were achieved by using automated exposure control and/or adjustment of mA  
and/or kV according to patient size and/or use of iterative reconstruction  
technique.  
  
FINDINGS: 
LUNG BASES: No visible pulmonary or pleural disease. 
LIVER: No enlargement, atrophy, suspicious density, or significant focal  
lesion. 
BILIARY: No dilatation or calcification. 
PANCREAS: No lesion, fluid collection, or abnormal duct dilatation. 
SPLEEN: No enlargement or focal lesion. 
ADRENALS: No mass or enlargement. 
KIDNEYS: No mass, obstruction, or calcification. 
BOWEL/MESENTERY: No visible mass, obstruction, or bowel wall thickening.  
Normal  
appendix. 
AORTA/VASCULAR: No aneurysm or dissection. 
RETROPERITONEUM: No mass or adenopathy. 
LYMPH NODES: No adenopathy. 
URINARY BLADDER: No visible focal wall thickening, lesion, or calculus. 
PELVIC ORGANS: No visible mass. Pelvic organs appropriate for patient age. 
ABDOMINAL WALL: Tiny fat filled inguinal hernias bilaterally. 
BONES: No bony lesion or fracture. 
OTHER: Negative. 
  
ORDER #: [id number] CT/CT abdomen pelvis wo con  
IMPRESSION:   
   
1. No urinary tract calculi, obstructive uropathy, or suspicious findings.  
2. Unremarkable bowel.  
3. Tiny bilateral fat filled inguinal hernias of questionable clinical   
significance.  
   
   
Electronically authenticated by: ARIE STROUD   Date: 10/04/2024  09:24